# Patient Record
Sex: MALE | Race: WHITE | NOT HISPANIC OR LATINO
[De-identification: names, ages, dates, MRNs, and addresses within clinical notes are randomized per-mention and may not be internally consistent; named-entity substitution may affect disease eponyms.]

---

## 2018-08-15 ENCOUNTER — APPOINTMENT (OUTPATIENT)
Dept: FAMILY MEDICINE | Facility: CLINIC | Age: 73
End: 2018-08-15
Payer: MEDICARE

## 2018-08-15 VITALS
HEIGHT: 67 IN | TEMPERATURE: 98.4 F | OXYGEN SATURATION: 97 % | SYSTOLIC BLOOD PRESSURE: 128 MMHG | DIASTOLIC BLOOD PRESSURE: 80 MMHG | HEART RATE: 74 BPM | WEIGHT: 160 LBS | BODY MASS INDEX: 25.11 KG/M2

## 2018-08-15 DIAGNOSIS — R32 UNSPECIFIED URINARY INCONTINENCE: ICD-10-CM

## 2018-08-15 PROCEDURE — 36415 COLL VENOUS BLD VENIPUNCTURE: CPT

## 2018-08-15 PROCEDURE — 93000 ELECTROCARDIOGRAM COMPLETE: CPT | Mod: 59

## 2018-08-15 PROCEDURE — G0438: CPT

## 2018-08-15 RX ORDER — MIRABEGRON 25 MG/1
25 TABLET, FILM COATED, EXTENDED RELEASE ORAL
Qty: 30 | Refills: 0 | Status: COMPLETED | COMMUNITY
Start: 2018-03-14

## 2018-08-15 RX ORDER — SULFAMETHOXAZOLE AND TRIMETHOPRIM 800; 160 MG/1; MG/1
800-160 TABLET ORAL
Qty: 14 | Refills: 0 | Status: COMPLETED | COMMUNITY
Start: 2018-05-21

## 2018-08-15 RX ORDER — ALBUTEROL SULFATE 90 UG/1
108 (90 BASE) AEROSOL, METERED RESPIRATORY (INHALATION)
Qty: 18 | Refills: 0 | Status: COMPLETED | COMMUNITY
Start: 2018-05-30

## 2018-08-15 RX ORDER — PREDNISONE 5 MG/1
5 TABLET ORAL
Qty: 5 | Refills: 0 | Status: COMPLETED | COMMUNITY
Start: 2018-05-30

## 2018-08-15 RX ORDER — CEFUROXIME AXETIL 500 MG/1
500 TABLET ORAL
Qty: 14 | Refills: 0 | Status: COMPLETED | COMMUNITY
Start: 2018-04-25

## 2018-08-15 RX ORDER — OXYBUTYNIN CHLORIDE 10 MG/1
10 TABLET, EXTENDED RELEASE ORAL
Qty: 30 | Refills: 0 | Status: COMPLETED | COMMUNITY
Start: 2018-03-28

## 2018-08-15 RX ORDER — LEVOCETIRIZINE DIHYDROCHLORIDE 5 MG/1
5 TABLET ORAL
Qty: 30 | Refills: 0 | Status: COMPLETED | COMMUNITY
Start: 2018-05-25

## 2018-08-15 NOTE — COUNSELING
[Activity counseling provided] : activity [Behavioral health counseling provided] : behavioral health  [Walking] : Walking [Engage in a relaxing activity] : Engage in a relaxing activity [Good understanding] : Patient has a good understanding of lifestyle changes and the steps needed to achieve self management goals

## 2018-08-16 NOTE — DATA REVIEWED
[FreeTextEntry1] : Patient blood work which he brought with him was d/w him at length \par EKG - NSR at 76 bpm, no acute ST- T changes

## 2018-08-16 NOTE — PHYSICAL EXAM
[No Acute Distress] : no acute distress [Well Nourished] : well nourished [Well Developed] : well developed [Well-Appearing] : well-appearing [Normal Sclera/Conjunctiva] : normal sclera/conjunctiva [PERRL] : pupils equal round and reactive to light [Normal Outer Ear/Nose] : the outer ears and nose were normal in appearance [Normal Oropharynx] : the oropharynx was normal [No JVD] : no jugular venous distention [Supple] : supple [No Lymphadenopathy] : no lymphadenopathy [Thyroid Normal, No Nodules] : the thyroid was normal and there were no nodules present [No Respiratory Distress] : no respiratory distress  [Clear to Auscultation] : lungs were clear to auscultation bilaterally [No Accessory Muscle Use] : no accessory muscle use [Normal Rate] : normal rate  [Regular Rhythm] : with a regular rhythm [Normal S1, S2] : normal S1 and S2 [No Murmur] : no murmur heard [No Varicosities] : no varicosities [Pedal Pulses Present] : the pedal pulses are present [No Edema] : there was no peripheral edema [No Extremity Clubbing/Cyanosis] : no extremity clubbing/cyanosis [Soft] : abdomen soft [Non Tender] : non-tender [Non-distended] : non-distended [No Masses] : no abdominal mass palpated [No HSM] : no HSM [Normal Bowel Sounds] : normal bowel sounds [Declined Rectal Exam] : declined rectal exam [Normal Supraclavicular Nodes] : no supraclavicular lymphadenopathy [Normal Axillary Nodes] : no axillary lymphadenopathy [Normal Anterior Cervical Nodes] : no anterior cervical lymphadenopathy [Normal Inguinal Nodes] : no inguinal lymphadenopathy [No CVA Tenderness] : no CVA  tenderness [No Joint Swelling] : no joint swelling [Grossly Normal Strength/Tone] : grossly normal strength/tone [No Rash] : no rash [Normal Gait] : normal gait [Coordination Grossly Intact] : coordination grossly intact [No Focal Deficits] : no focal deficits [Deep Tendon Reflexes (DTR)] : deep tendon reflexes were 2+ and symmetric [Alert and Oriented x3] : oriented to person, place, and time [FreeTextEntry1] :  done by LEATHA [de-identified] : refused - done by LEATHA

## 2018-08-16 NOTE — HEALTH RISK ASSESSMENT
[Good] : ~his/her~  mood as  good [No falls in past year] : Patient reported no falls in the past year [0] : 2) Feeling down, depressed, or hopeless: Not at all (0) [Patient reported colonoscopy was normal] : Patient reported colonoscopy was normal [HIV Test offered] : HIV Test offered [Hepatitis C test offered] : Hepatitis C test offered [Alone] : lives alone [Employed] : employed [College] : College [] :  [# Of Children ___] : has [unfilled] children [Feels Safe at Home] : Feels safe at home [Independent] : managing finances [Smoke Detector] : smoke detector [Carbon Monoxide Detector] : carbon monoxide detector [Seat Belt] :  uses seat belt [Sunscreen] : uses sunscreen [Discussed at today's visit] : Advance Directives Discussed at today's visit [Aggressive treatment] : aggressive treatment [FreeTextEntry1] : none [] : No [de-identified] : NO [[id number]] : 0 [Change in mental status noted] : No change in mental status noted [Language] : denies difficulty with language [Handling Complex Tasks] : denies difficulty handling complex tasks [Reports changes in hearing] : Reports no changes in hearing [Reports changes in vision] : Reports no changes in vision [Reports changes in dental health] : Reports no changes in dental health [BoneDensityDate] : never  [ColonoscopyDate] : 2014

## 2018-08-16 NOTE — HISTORY OF PRESENT ILLNESS
[FreeTextEntry1] : cc: new pt , physical  [de-identified] : Encounter conducted in Polish , pt. native language. \par Patient came to establish himself as a new patient. Denies CP,no SOB,no Abd pain, no N,V, C,D. patient has urinary problem in the beginning of the  year - he f/u with , now better.

## 2018-08-16 NOTE — HISTORY OF PRESENT ILLNESS
[FreeTextEntry1] : cc: new pt , physical  [de-identified] : Encounter conducted in Polish , pt. native language. \par Patient came to establish himself as a new patient. Denies CP,no SOB,no Abd pain, no N,V, C,D. patient has urinary problem in the beginning of the  year - he f/u with , now better.

## 2018-08-16 NOTE — HEALTH RISK ASSESSMENT
[Good] : ~his/her~  mood as  good [No falls in past year] : Patient reported no falls in the past year [0] : 2) Feeling down, depressed, or hopeless: Not at all (0) [Patient reported colonoscopy was normal] : Patient reported colonoscopy was normal [HIV Test offered] : HIV Test offered [Hepatitis C test offered] : Hepatitis C test offered [Alone] : lives alone [Employed] : employed [College] : College [] :  [# Of Children ___] : has [unfilled] children [Feels Safe at Home] : Feels safe at home [Independent] : managing finances [Smoke Detector] : smoke detector [Carbon Monoxide Detector] : carbon monoxide detector [Seat Belt] :  uses seat belt [Sunscreen] : uses sunscreen [Discussed at today's visit] : Advance Directives Discussed at today's visit [Aggressive treatment] : aggressive treatment [FreeTextEntry1] : none [] : No [de-identified] : NO [[id number]] : 0 [Change in mental status noted] : No change in mental status noted [Language] : denies difficulty with language [Handling Complex Tasks] : denies difficulty handling complex tasks [Reports changes in hearing] : Reports no changes in hearing [Reports changes in vision] : Reports no changes in vision [Reports changes in dental health] : Reports no changes in dental health [BoneDensityDate] : never  [ColonoscopyDate] : 2014

## 2018-08-16 NOTE — PHYSICAL EXAM
[No Acute Distress] : no acute distress [Well Nourished] : well nourished [Well Developed] : well developed [Well-Appearing] : well-appearing [Normal Sclera/Conjunctiva] : normal sclera/conjunctiva [PERRL] : pupils equal round and reactive to light [Normal Outer Ear/Nose] : the outer ears and nose were normal in appearance [Normal Oropharynx] : the oropharynx was normal [No JVD] : no jugular venous distention [Supple] : supple [No Lymphadenopathy] : no lymphadenopathy [Thyroid Normal, No Nodules] : the thyroid was normal and there were no nodules present [No Respiratory Distress] : no respiratory distress  [Clear to Auscultation] : lungs were clear to auscultation bilaterally [No Accessory Muscle Use] : no accessory muscle use [Normal Rate] : normal rate  [Regular Rhythm] : with a regular rhythm [Normal S1, S2] : normal S1 and S2 [No Murmur] : no murmur heard [No Varicosities] : no varicosities [Pedal Pulses Present] : the pedal pulses are present [No Edema] : there was no peripheral edema [No Extremity Clubbing/Cyanosis] : no extremity clubbing/cyanosis [Soft] : abdomen soft [Non Tender] : non-tender [Non-distended] : non-distended [No Masses] : no abdominal mass palpated [No HSM] : no HSM [Normal Bowel Sounds] : normal bowel sounds [Declined Rectal Exam] : declined rectal exam [Normal Supraclavicular Nodes] : no supraclavicular lymphadenopathy [Normal Axillary Nodes] : no axillary lymphadenopathy [Normal Anterior Cervical Nodes] : no anterior cervical lymphadenopathy [Normal Inguinal Nodes] : no inguinal lymphadenopathy [No CVA Tenderness] : no CVA  tenderness [No Joint Swelling] : no joint swelling [Grossly Normal Strength/Tone] : grossly normal strength/tone [No Rash] : no rash [Normal Gait] : normal gait [Coordination Grossly Intact] : coordination grossly intact [No Focal Deficits] : no focal deficits [Deep Tendon Reflexes (DTR)] : deep tendon reflexes were 2+ and symmetric [Alert and Oriented x3] : oriented to person, place, and time [FreeTextEntry1] :  done by LEATHA [de-identified] : refused - done by LEATHA

## 2018-08-17 LAB
25(OH)D3 SERPL-MCNC: 30.6 NG/ML
ALBUMIN SERPL ELPH-MCNC: 5 G/DL
ALP BLD-CCNC: 94 U/L
ALT SERPL-CCNC: 14 U/L
ANION GAP SERPL CALC-SCNC: 23 MMOL/L
APPEARANCE: CLEAR
AST SERPL-CCNC: 19 U/L
BASOPHILS # BLD AUTO: 0.04 K/UL
BASOPHILS NFR BLD AUTO: 0.4 %
BILIRUB SERPL-MCNC: 0.4 MG/DL
BILIRUBIN URINE: NEGATIVE
BLOOD URINE: NEGATIVE
BUN SERPL-MCNC: 21 MG/DL
CALCIUM SERPL-MCNC: 9.9 MG/DL
CHLORIDE SERPL-SCNC: 102 MMOL/L
CHOLEST SERPL-MCNC: 163 MG/DL
CHOLEST/HDLC SERPL: 3 RATIO
CO2 SERPL-SCNC: 16 MMOL/L
COLOR: YELLOW
CREAT SERPL-MCNC: 0.96 MG/DL
EOSINOPHIL # BLD AUTO: 0.4 K/UL
EOSINOPHIL NFR BLD AUTO: 4 %
FOLATE SERPL-MCNC: 7 NG/ML
GLUCOSE QUALITATIVE U: NEGATIVE MG/DL
GLUCOSE SERPL-MCNC: 100 MG/DL
HAV IGM SER QL: NONREACTIVE
HBA1C MFR BLD HPLC: 5.3 %
HBV CORE IGM SER QL: NONREACTIVE
HBV SURFACE AG SER QL: NONREACTIVE
HCT VFR BLD CALC: 46.8 %
HCV AB SER QL: NONREACTIVE
HCV S/CO RATIO: 0.24 S/CO
HDLC SERPL-MCNC: 54 MG/DL
HGB BLD-MCNC: 15.1 G/DL
HIV1+2 AB SPEC QL IA.RAPID: NONREACTIVE
IMM GRANULOCYTES NFR BLD AUTO: 0.2 %
KETONES URINE: ABNORMAL
LDLC SERPL CALC-MCNC: 86 MG/DL
LEUKOCYTE ESTERASE URINE: NEGATIVE
LYMPHOCYTES # BLD AUTO: 2.57 K/UL
LYMPHOCYTES NFR BLD AUTO: 25.6 %
MAN DIFF?: NORMAL
MCHC RBC-ENTMCNC: 31.4 PG
MCHC RBC-ENTMCNC: 32.3 GM/DL
MCV RBC AUTO: 97.3 FL
MONOCYTES # BLD AUTO: 0.72 K/UL
MONOCYTES NFR BLD AUTO: 7.2 %
NEUTROPHILS # BLD AUTO: 6.28 K/UL
NEUTROPHILS NFR BLD AUTO: 62.6 %
NITRITE URINE: NEGATIVE
PH URINE: 5.5
PLATELET # BLD AUTO: 222 K/UL
POTASSIUM SERPL-SCNC: 4 MMOL/L
PROT SERPL-MCNC: 7.5 G/DL
PROTEIN URINE: NEGATIVE MG/DL
PSA FREE FLD-MCNC: 24.3
PSA FREE SERPL-MCNC: 0.62 NG/ML
PSA FREE SERPL-MCNC: 0.62 NG/ML
PSA SERPL-MCNC: 2.55 NG/ML
RBC # BLD: 4.81 M/UL
RBC # FLD: 13.9 %
SODIUM SERPL-SCNC: 141 MMOL/L
SPECIFIC GRAVITY URINE: 1.02
TRIGL SERPL-MCNC: 114 MG/DL
TSH SERPL-ACNC: 1.48 UIU/ML
URATE SERPL-MCNC: 7.6 MG/DL
UROBILINOGEN URINE: NEGATIVE MG/DL
VIT B12 SERPL-MCNC: 416 PG/ML
WBC # FLD AUTO: 10.03 K/UL

## 2018-09-05 ENCOUNTER — APPOINTMENT (OUTPATIENT)
Dept: FAMILY MEDICINE | Facility: CLINIC | Age: 73
End: 2018-09-05

## 2018-09-06 ENCOUNTER — LABORATORY RESULT (OUTPATIENT)
Age: 73
End: 2018-09-06

## 2018-09-06 ENCOUNTER — APPOINTMENT (OUTPATIENT)
Dept: FAMILY MEDICINE | Facility: CLINIC | Age: 73
End: 2018-09-06
Payer: MEDICARE

## 2018-09-06 VITALS
OXYGEN SATURATION: 98 % | DIASTOLIC BLOOD PRESSURE: 80 MMHG | RESPIRATION RATE: 16 BRPM | SYSTOLIC BLOOD PRESSURE: 160 MMHG | HEART RATE: 68 BPM

## 2018-09-06 DIAGNOSIS — L25.9 UNSPECIFIED CONTACT DERMATITIS, UNSPECIFIED CAUSE: ICD-10-CM

## 2018-09-06 PROCEDURE — 99213 OFFICE O/P EST LOW 20 MIN: CPT

## 2018-09-10 LAB
BARLEY IGE QN: <0.1 KUA/L
CHERRY IGE QN: 0.18 KUA/L
COMMON WASP (YELLOW JACKET) CLASS: 0
COMMON WASP (YELLOW JACKET) CONC: <0.1 KUA/L
COW MILK IGE QN: <0.1 KUA/L
CRAB IGE QN: <0.1 KUA/L
D FARINAE IGE QN: <0.1 KUA/L
D PTERONYSS IGE QN: <0.1 KUA/L
DEPRECATED BARLEY IGE RAST QL: 0
DEPRECATED CHERRY IGE RAST QL: NORMAL
DEPRECATED COW MILK IGE RAST QL: 0
DEPRECATED CRAB IGE RAST QL: 0
DEPRECATED D FARINAE IGE RAST QL: 0
DEPRECATED D PTERONYSS IGE RAST QL: 0
DEPRECATED EGG WHITE IGE RAST QL: 0
DEPRECATED HOUSE DUST IGE RAST QL: <0.1 KUA/L
DEPRECATED OAT IGE RAST QL: 0
DEPRECATED PEANUT IGE RAST QL: 0
DEPRECATED ROACH IGE RAST QL: 0
DEPRECATED RYE IGE RAST QL: 0
DEPRECATED SOYBEAN IGE RAST QL: 0
DEPRECATED WHEAT IGE RAST QL: 0
DEPRECATED WHITEFACED HORNET IGE RAST QL: 0
EGG WHITE IGE QN: <0.1 KUA/L
HEMOCCULT STL QL IA: NEGATIVE
HONEY BEE (I1) CLASS: 0
HONEY BEE (I1) CONC: <0.1 KUA/L
HOUSE DUST AP IGE QN: 0
OAT IGE QN: <0.1 KUA/L
PAPER WASP (I4) CLASS: 0
PAPER WASP (I4) CONC: <0.1 KUA/L
PEANUT IGE QN: <0.1 KUA/L
ROACH IGE QN: <0.1 KUA/L
RYE IGE QN: <0.1 KUA/L
SOYBEAN IGE QN: <0.1 KUA/L
TOTAL IGE SMQN RAST: 93 KU/L
WHEAT IGE QN: <0.1 KUA/L
WHITEFACED HORNET IGE QN: <0.1 KUA/L
YELLOW HORNET (I5) CLASS: 0
YELLOW HORNET (I5) CONC: <0.1 KUA/L

## 2018-09-13 NOTE — ASSESSMENT
[FreeTextEntry1] : 1) Contact dermatitis\par cont Benadryl TID PRN\par Prednisone\par if no improvement RTO

## 2018-09-13 NOTE — HISTORY OF PRESENT ILLNESS
[FreeTextEntry8] : cc: skin rash\par Patient had skin rash on 8/28/18 - after cleaning old , unoccupied house, Since then on and off, c/o hives,no SOB,no face edema, Denies CP,no wheezing, took Benadryl with mild improvement.

## 2018-09-13 NOTE — PHYSICAL EXAM
[No Acute Distress] : no acute distress [No Respiratory Distress] : no respiratory distress  [Clear to Auscultation] : lungs were clear to auscultation bilaterally [No Accessory Muscle Use] : no accessory muscle use [Normal Rate] : normal rate  [Regular Rhythm] : with a regular rhythm [Normal S1, S2] : normal S1 and S2 [No Murmur] : no murmur heard [Soft] : abdomen soft [Non Tender] : non-tender [Non-distended] : non-distended [No Masses] : no abdominal mass palpated [No HSM] : no HSM [Normal Bowel Sounds] : normal bowel sounds [No Joint Swelling] : no joint swelling [No Rash] : no rash [No Skin Lesions] : no skin lesions

## 2018-09-13 NOTE — REVIEW OF SYSTEMS
[Itching] : itching [Mole Changes] : no mole changes [Nail Changes] : no nail changes [Hair Changes] : no hair changes [Skin Rash] : skin rash [Negative] : Heme/Lymph

## 2018-09-28 ENCOUNTER — TRANSCRIPTION ENCOUNTER (OUTPATIENT)
Age: 73
End: 2018-09-28

## 2018-12-06 ENCOUNTER — APPOINTMENT (OUTPATIENT)
Dept: FAMILY MEDICINE | Facility: CLINIC | Age: 73
End: 2018-12-06
Payer: MEDICARE

## 2018-12-06 VITALS
SYSTOLIC BLOOD PRESSURE: 142 MMHG | TEMPERATURE: 98 F | HEIGHT: 67 IN | DIASTOLIC BLOOD PRESSURE: 80 MMHG | HEART RATE: 60 BPM | OXYGEN SATURATION: 97 % | RESPIRATION RATE: 15 BRPM

## 2018-12-06 PROCEDURE — 99214 OFFICE O/P EST MOD 30 MIN: CPT | Mod: 25

## 2018-12-06 PROCEDURE — 36415 COLL VENOUS BLD VENIPUNCTURE: CPT

## 2018-12-06 RX ORDER — PREDNISONE 20 MG/1
20 TABLET ORAL
Qty: 12 | Refills: 0 | Status: COMPLETED | COMMUNITY
Start: 2018-09-06 | End: 2018-12-06

## 2018-12-06 NOTE — REVIEW OF SYSTEMS
[Postnasal Drip] : postnasal drip [Nasal Discharge] : nasal discharge [Hoarseness] : hoarseness [Cough] : cough [Negative] : Psychiatric [Earache] : no earache [Sore Throat] : no sore throat [Shortness Of Breath] : no shortness of breath [Wheezing] : no wheezing [Dyspnea on Exertion] : not dyspnea on exertion [Itching] : no itching [Mole Changes] : no mole changes [Nail Changes] : no nail changes [Hair Changes] : no hair changes [Skin Rash] : no skin rash

## 2018-12-06 NOTE — HEALTH RISK ASSESSMENT
[No falls in past year] : Patient reported no falls in the past year [0] : 2) Feeling down, depressed, or hopeless: Not at all (0) [] : No [[id number]] : 0

## 2018-12-06 NOTE — PHYSICAL EXAM
[No Acute Distress] : no acute distress [No Respiratory Distress] : no respiratory distress  [No Accessory Muscle Use] : no accessory muscle use [Normal Rate] : normal rate  [Regular Rhythm] : with a regular rhythm [Normal S1, S2] : normal S1 and S2 [No Murmur] : no murmur heard [No Edema] : there was no peripheral edema [Soft] : abdomen soft [Non Tender] : non-tender [Non-distended] : non-distended [No Masses] : no abdominal mass palpated [No HSM] : no HSM [Normal Bowel Sounds] : normal bowel sounds [Normal Supraclavicular Nodes] : no supraclavicular lymphadenopathy [Normal Axillary Nodes] : no axillary lymphadenopathy [Normal Posterior Cervical Nodes] : no posterior cervical lymphadenopathy [Normal Anterior Cervical Nodes] : no anterior cervical lymphadenopathy [No Joint Swelling] : no joint swelling [No Rash] : no rash [No Skin Lesions] : no skin lesions [de-identified] : right posterior wheezing, + rhonchi

## 2018-12-06 NOTE — HISTORY OF PRESENT ILLNESS
[Congestion] : congestion [Cough] : cough [Moderate] : moderate [___ Days ago] : [unfilled] days ago [Paroxysmal] : paroxysmal [Chills] : chills [Headache] : headache [Improving] : improving [OTC Remedies] : OTC remedies [Sore Throat] : no sore throat [Wheezing] : no wheezing [Shortness Of Breath] : no shortness of breath [Earache] : no earache [Fever] : no fever [FreeTextEntry8] : Encounter conducted in Polish.\par \par Patient denies sick contact. He c/o abd bloating,generalized  pain, dull on and off - few months , no N,V, C,D, no weight loss .

## 2018-12-07 LAB
25(OH)D3 SERPL-MCNC: 28.3 NG/ML
ALBUMIN SERPL ELPH-MCNC: 4.6 G/DL
ALP BLD-CCNC: 81 U/L
ALT SERPL-CCNC: 8 U/L
AMYLASE/CREAT SERPL: 67 U/L
ANION GAP SERPL CALC-SCNC: 11 MMOL/L
AST SERPL-CCNC: 13 U/L
BASOPHILS # BLD AUTO: 0.02 K/UL
BASOPHILS NFR BLD AUTO: 0.3 %
BILIRUB SERPL-MCNC: 0.5 MG/DL
BUN SERPL-MCNC: 14 MG/DL
CALCIUM SERPL-MCNC: 9.4 MG/DL
CHLORIDE SERPL-SCNC: 104 MMOL/L
CHOLEST SERPL-MCNC: 141 MG/DL
CHOLEST/HDLC SERPL: 3 RATIO
CO2 SERPL-SCNC: 27 MMOL/L
CREAT SERPL-MCNC: 1.05 MG/DL
EOSINOPHIL # BLD AUTO: 0.28 K/UL
EOSINOPHIL NFR BLD AUTO: 3.8 %
GGT SERPL-CCNC: 15 U/L
GLUCOSE SERPL-MCNC: 99 MG/DL
HAV IGM SER QL: NONREACTIVE
HBV CORE IGM SER QL: NONREACTIVE
HBV SURFACE AG SER QL: NONREACTIVE
HCT VFR BLD CALC: 46.9 %
HCV AB SER QL: NONREACTIVE
HCV S/CO RATIO: 0.18 S/CO
HDLC SERPL-MCNC: 47 MG/DL
HGB BLD-MCNC: 15.1 G/DL
IMM GRANULOCYTES NFR BLD AUTO: 0.3 %
LDLC SERPL CALC-MCNC: 73 MG/DL
LPL SERPL-CCNC: 48 U/L
LYMPHOCYTES # BLD AUTO: 2.02 K/UL
LYMPHOCYTES NFR BLD AUTO: 27.6 %
MAN DIFF?: NORMAL
MCHC RBC-ENTMCNC: 31.4 PG
MCHC RBC-ENTMCNC: 32.2 GM/DL
MCV RBC AUTO: 97.5 FL
MONOCYTES # BLD AUTO: 0.58 K/UL
MONOCYTES NFR BLD AUTO: 7.9 %
NEUTROPHILS # BLD AUTO: 4.39 K/UL
NEUTROPHILS NFR BLD AUTO: 60.1 %
PLATELET # BLD AUTO: 231 K/UL
POTASSIUM SERPL-SCNC: 4.4 MMOL/L
PROT SERPL-MCNC: 7.2 G/DL
RBC # BLD: 4.81 M/UL
RBC # FLD: 13.2 %
SODIUM SERPL-SCNC: 142 MMOL/L
TRIGL SERPL-MCNC: 103 MG/DL
WBC # FLD AUTO: 7.31 K/UL

## 2018-12-13 ENCOUNTER — FORM ENCOUNTER (OUTPATIENT)
Age: 73
End: 2018-12-13

## 2018-12-14 ENCOUNTER — OUTPATIENT (OUTPATIENT)
Dept: OUTPATIENT SERVICES | Facility: HOSPITAL | Age: 73
LOS: 1 days | End: 2018-12-14
Payer: COMMERCIAL

## 2018-12-14 ENCOUNTER — APPOINTMENT (OUTPATIENT)
Dept: ULTRASOUND IMAGING | Facility: HOSPITAL | Age: 73
End: 2018-12-14
Payer: MEDICARE

## 2018-12-14 DIAGNOSIS — R10.9 UNSPECIFIED ABDOMINAL PAIN: ICD-10-CM

## 2018-12-14 PROCEDURE — 76700 US EXAM ABDOM COMPLETE: CPT | Mod: 26

## 2018-12-14 PROCEDURE — 76700 US EXAM ABDOM COMPLETE: CPT

## 2018-12-18 ENCOUNTER — RESULT CHARGE (OUTPATIENT)
Age: 73
End: 2018-12-18

## 2018-12-18 ENCOUNTER — OTHER (OUTPATIENT)
Age: 73
End: 2018-12-18

## 2018-12-18 DIAGNOSIS — K76.89 OTHER SPECIFIED DISEASES OF LIVER: ICD-10-CM

## 2019-01-06 ENCOUNTER — FORM ENCOUNTER (OUTPATIENT)
Age: 74
End: 2019-01-06

## 2019-01-07 ENCOUNTER — APPOINTMENT (OUTPATIENT)
Dept: CT IMAGING | Facility: HOSPITAL | Age: 74
End: 2019-01-07
Payer: MEDICARE

## 2019-01-07 ENCOUNTER — OUTPATIENT (OUTPATIENT)
Dept: OUTPATIENT SERVICES | Facility: HOSPITAL | Age: 74
LOS: 1 days | End: 2019-01-07
Payer: COMMERCIAL

## 2019-01-07 DIAGNOSIS — R10.9 UNSPECIFIED ABDOMINAL PAIN: ICD-10-CM

## 2019-01-07 PROCEDURE — 74177 CT ABD & PELVIS W/CONTRAST: CPT

## 2019-01-07 PROCEDURE — 74177 CT ABD & PELVIS W/CONTRAST: CPT | Mod: 26

## 2019-01-09 ENCOUNTER — FORM ENCOUNTER (OUTPATIENT)
Age: 74
End: 2019-01-09

## 2019-01-10 ENCOUNTER — OUTPATIENT (OUTPATIENT)
Dept: OUTPATIENT SERVICES | Facility: HOSPITAL | Age: 74
LOS: 1 days | End: 2019-01-10
Payer: COMMERCIAL

## 2019-01-10 ENCOUNTER — APPOINTMENT (OUTPATIENT)
Dept: CT IMAGING | Facility: HOSPITAL | Age: 74
End: 2019-01-10
Payer: MEDICARE

## 2019-01-10 DIAGNOSIS — R91.1 SOLITARY PULMONARY NODULE: ICD-10-CM

## 2019-01-10 PROCEDURE — 71250 CT THORAX DX C-: CPT

## 2019-01-10 PROCEDURE — 71250 CT THORAX DX C-: CPT | Mod: 26

## 2019-03-09 ENCOUNTER — TRANSCRIPTION ENCOUNTER (OUTPATIENT)
Age: 74
End: 2019-03-09

## 2019-08-07 ENCOUNTER — APPOINTMENT (OUTPATIENT)
Dept: FAMILY MEDICINE | Facility: CLINIC | Age: 74
End: 2019-08-07
Payer: MEDICARE

## 2019-08-07 VITALS
RESPIRATION RATE: 17 BRPM | HEIGHT: 67 IN | OXYGEN SATURATION: 97 % | HEART RATE: 85 BPM | BODY MASS INDEX: 25.74 KG/M2 | DIASTOLIC BLOOD PRESSURE: 70 MMHG | TEMPERATURE: 98.4 F | SYSTOLIC BLOOD PRESSURE: 140 MMHG | WEIGHT: 164 LBS

## 2019-08-07 PROCEDURE — 99213 OFFICE O/P EST LOW 20 MIN: CPT | Mod: 25

## 2019-08-07 PROCEDURE — 96372 THER/PROPH/DIAG INJ SC/IM: CPT

## 2019-08-07 RX ORDER — METHYLPRED ACET/NACL,ISO-OS/PF 40 MG/ML
40 VIAL (ML) INJECTION
Qty: 1 | Refills: 0 | Status: COMPLETED | OUTPATIENT
Start: 2019-08-07

## 2019-08-07 RX ORDER — AZITHROMYCIN 250 MG/1
250 TABLET, FILM COATED ORAL
Qty: 1 | Refills: 0 | Status: COMPLETED | COMMUNITY
Start: 2018-12-06 | End: 2019-08-07

## 2019-08-07 RX ORDER — GUAIFENESIN 600 MG/1
600 TABLET, EXTENDED RELEASE ORAL
Refills: 0 | Status: COMPLETED | COMMUNITY
End: 2019-08-07

## 2019-08-07 RX ADMIN — METHYLPREDNISOLONE ACETATE 0 MG/ML: 40 INJECTION, SUSPENSION INTRA-ARTICULAR; INTRALESIONAL; INTRAMUSCULAR; SOFT TISSUE at 00:00

## 2019-08-07 NOTE — REVIEW OF SYSTEMS
[Fever] : no fever [Chills] : no chills [Fatigue] : fatigue [Nosebleeds] : no nosebleeds [Earache] : earache [Postnasal Drip] : postnasal drip [Nasal Discharge] : nasal discharge [Sore Throat] : no sore throat [Hoarseness] : hoarseness [Shortness Of Breath] : no shortness of breath [Wheezing] : wheezing [Cough] : no cough [Dyspnea on Exertion] : not dyspnea on exertion [Negative] : Neurological

## 2019-08-07 NOTE — PHYSICAL EXAM
[No Respiratory Distress] : no respiratory distress  [Normal] : no joint swelling and grossly normal strength and tone [de-identified] : + rhonchi, + wheezing

## 2019-08-07 NOTE — HISTORY OF PRESENT ILLNESS
[Cold Symptoms] : cold symptoms [Earache (L)] : pain in left ear [Moderate] : moderate [___ Days ago] : [unfilled] days ago [Constant] : constant [Congestion] : congestion [Cough] : cough [Earache] : earache [Worsening] : worsening [Wheezing] : wheezing [Sore Throat] : no sore throat [Chills] : no chills [Anorexia] : no anorexia [Shortness Of Breath] : no shortness of breath [Fatigue] : not fatigue [Headache] : no headache [Fever] : no fever [FreeTextEntry8] : Patient peaces CP , Abd pain. Patient took OTC meds, no improvement. + sick contact grandchildren.

## 2019-08-07 NOTE — COUNSELING
[Fall prevention counseling provided] : Fall prevention counseling provided [Adequate lighting] : Adequate lighting [No throw rugs] : No throw rugs [Use proper foot wear] : Use proper foot wear [Use recommended devices] : Use recommended devices [None] : None [Good understanding] : Patient has a good understanding of lifestyle changes and steps needed to achieve self management goal

## 2019-08-13 ENCOUNTER — FORM ENCOUNTER (OUTPATIENT)
Age: 74
End: 2019-08-13

## 2019-08-14 ENCOUNTER — APPOINTMENT (OUTPATIENT)
Dept: RADIOLOGY | Facility: HOSPITAL | Age: 74
End: 2019-08-14
Payer: MEDICARE

## 2019-08-14 ENCOUNTER — APPOINTMENT (OUTPATIENT)
Dept: FAMILY MEDICINE | Facility: CLINIC | Age: 74
End: 2019-08-14
Payer: MEDICARE

## 2019-08-14 ENCOUNTER — OUTPATIENT (OUTPATIENT)
Dept: OUTPATIENT SERVICES | Facility: HOSPITAL | Age: 74
LOS: 1 days | End: 2019-08-14
Payer: COMMERCIAL

## 2019-08-14 ENCOUNTER — RX RENEWAL (OUTPATIENT)
Age: 74
End: 2019-08-14

## 2019-08-14 VITALS
BODY MASS INDEX: 25.58 KG/M2 | HEART RATE: 98 BPM | WEIGHT: 163 LBS | OXYGEN SATURATION: 93 % | HEIGHT: 67 IN | TEMPERATURE: 97.9 F | RESPIRATION RATE: 19 BRPM | SYSTOLIC BLOOD PRESSURE: 160 MMHG | DIASTOLIC BLOOD PRESSURE: 70 MMHG

## 2019-08-14 DIAGNOSIS — Z00.8 ENCOUNTER FOR OTHER GENERAL EXAMINATION: ICD-10-CM

## 2019-08-14 PROCEDURE — 71046 X-RAY EXAM CHEST 2 VIEWS: CPT

## 2019-08-14 PROCEDURE — 36415 COLL VENOUS BLD VENIPUNCTURE: CPT

## 2019-08-14 PROCEDURE — 99213 OFFICE O/P EST LOW 20 MIN: CPT | Mod: 25

## 2019-08-14 PROCEDURE — 71046 X-RAY EXAM CHEST 2 VIEWS: CPT | Mod: 26

## 2019-08-14 RX ORDER — AMOXICILLIN AND CLAVULANATE POTASSIUM 875; 125 MG/1; MG/1
875-125 TABLET, COATED ORAL
Qty: 14 | Refills: 0 | Status: COMPLETED | COMMUNITY
Start: 2019-08-07 | End: 2019-08-14

## 2019-08-15 ENCOUNTER — RX RENEWAL (OUTPATIENT)
Age: 74
End: 2019-08-15

## 2019-08-15 LAB
ALBUMIN SERPL ELPH-MCNC: 4.5 G/DL
ALP BLD-CCNC: 77 U/L
ALT SERPL-CCNC: 11 U/L
ANION GAP SERPL CALC-SCNC: 11 MMOL/L
AST SERPL-CCNC: 13 U/L
BASOPHILS # BLD AUTO: 0.06 K/UL
BASOPHILS NFR BLD AUTO: 0.5 %
BILIRUB SERPL-MCNC: 0.5 MG/DL
BUN SERPL-MCNC: 18 MG/DL
CALCIUM SERPL-MCNC: 9.8 MG/DL
CHLORIDE SERPL-SCNC: 104 MMOL/L
CO2 SERPL-SCNC: 23 MMOL/L
CREAT SERPL-MCNC: 0.88 MG/DL
EOSINOPHIL # BLD AUTO: 0.32 K/UL
EOSINOPHIL NFR BLD AUTO: 2.9 %
GLUCOSE SERPL-MCNC: 119 MG/DL
HCT VFR BLD CALC: 45.7 %
HGB BLD-MCNC: 15 G/DL
IMM GRANULOCYTES NFR BLD AUTO: 0.5 %
LYMPHOCYTES # BLD AUTO: 1.91 K/UL
LYMPHOCYTES NFR BLD AUTO: 17.4 %
MAN DIFF?: NORMAL
MCHC RBC-ENTMCNC: 31.8 PG
MCHC RBC-ENTMCNC: 32.8 GM/DL
MCV RBC AUTO: 96.8 FL
MONOCYTES # BLD AUTO: 1.04 K/UL
MONOCYTES NFR BLD AUTO: 9.5 %
NEUTROPHILS # BLD AUTO: 7.59 K/UL
NEUTROPHILS NFR BLD AUTO: 69.2 %
PLATELET # BLD AUTO: 303 K/UL
POTASSIUM SERPL-SCNC: 4.2 MMOL/L
PROT SERPL-MCNC: 7.3 G/DL
RBC # BLD: 4.72 M/UL
RBC # FLD: 13.2 %
SODIUM SERPL-SCNC: 138 MMOL/L
WBC # FLD AUTO: 10.97 K/UL

## 2019-08-18 NOTE — REVIEW OF SYSTEMS
[Fever] : no fever [Chills] : no chills [Fatigue] : no fatigue [Earache] : no earache [Nosebleeds] : no nosebleeds [Postnasal Drip] : postnasal drip [Nasal Discharge] : no nasal discharge [Sore Throat] : no sore throat [Hoarseness] : no hoarseness [Shortness Of Breath] : no shortness of breath [Wheezing] : wheezing [Cough] : cough [Dyspnea on Exertion] : not dyspnea on exertion [Negative] : Neurological [FreeTextEntry6] : productive , green sputum

## 2019-08-18 NOTE — HISTORY OF PRESENT ILLNESS
[de-identified] : Encounter conducted in Polish , pt. native language. \par Patient presented for f/u on his Bronchitis - still coughing, + wheezing , deneis chills, no fever, deneis Fatigue, He completed Abx, steroids, not much better. He denies CP, Abd pain, no N,V<C<D.

## 2019-08-18 NOTE — PHYSICAL EXAM
[No Respiratory Distress] : no respiratory distress  [No Accessory Muscle Use] : no accessory muscle use [Normal] : no posterior cervical lymphadenopathy and no anterior cervical lymphadenopathy [de-identified] : + LLQ jhony

## 2019-08-18 NOTE — ASSESSMENT
[FreeTextEntry1] : CXR\par f/u blood work \par Levaquin\par Proventil PRN \par cough meds\par if SOB,fever, fatigue or any concerns ED eval advised

## 2019-08-20 ENCOUNTER — APPOINTMENT (OUTPATIENT)
Dept: FAMILY MEDICINE | Facility: CLINIC | Age: 74
End: 2019-08-20
Payer: MEDICARE

## 2019-08-20 VITALS
SYSTOLIC BLOOD PRESSURE: 140 MMHG | TEMPERATURE: 98.2 F | OXYGEN SATURATION: 97 % | HEIGHT: 67 IN | DIASTOLIC BLOOD PRESSURE: 66 MMHG | WEIGHT: 161 LBS | RESPIRATION RATE: 17 BRPM | HEART RATE: 75 BPM | BODY MASS INDEX: 25.27 KG/M2

## 2019-08-20 PROCEDURE — 99213 OFFICE O/P EST LOW 20 MIN: CPT

## 2019-08-20 RX ORDER — PREDNISONE 20 MG/1
20 TABLET ORAL DAILY
Qty: 5 | Refills: 0 | Status: COMPLETED | COMMUNITY
Start: 2019-08-07 | End: 2019-08-20

## 2019-08-20 NOTE — REVIEW OF SYSTEMS
[Fever] : no fever [Chills] : no chills [Earache] : no earache [Fatigue] : no fatigue [Nosebleeds] : no nosebleeds [Postnasal Drip] : postnasal drip [Nasal Discharge] : no nasal discharge [Sore Throat] : no sore throat [Shortness Of Breath] : no shortness of breath [Hoarseness] : no hoarseness [Wheezing] : no wheezing [Cough] : cough [Dyspnea on Exertion] : not dyspnea on exertion [Negative] : Neurological [FreeTextEntry6] : dry cough

## 2019-08-20 NOTE — COUNSELING
[Fall prevention counseling provided] : Fall prevention counseling provided [Adequate lighting] : Adequate lighting [No throw rugs] : No throw rugs [Use recommended devices] : Use recommended devices [Use proper foot wear] : Use proper foot wear [Good understanding] : Patient has a good understanding of lifestyle changes and steps needed to achieve self management goal [None] : None

## 2019-08-20 NOTE — PHYSICAL EXAM
[No Respiratory Distress] : no respiratory distress  [No Accessory Muscle Use] : no accessory muscle use [No Varicosities] : no varicosities [No Edema] : there was no peripheral edema [Normal] : no posterior cervical lymphadenopathy and no anterior cervical lymphadenopathy [de-identified] : scattered  rhonchi b/l posterior

## 2019-08-20 NOTE — HISTORY OF PRESENT ILLNESS
[FreeTextEntry1] : cc: cough , bronchitis [de-identified] : Encounter conducted in Polish , pt. native language. \par Patient presented for f/u on his  cough, bronchitis - still  coughing but less, ,no  wheezing , deneis chills, no fever,. He deneis CP, Abd pain.Pt took Hydromet sirup - was able to sleep better. CXR and BW d/w the pt  at length

## 2019-08-21 ENCOUNTER — APPOINTMENT (OUTPATIENT)
Dept: FAMILY MEDICINE | Facility: CLINIC | Age: 74
End: 2019-08-21
Payer: MEDICARE

## 2019-10-17 ENCOUNTER — APPOINTMENT (OUTPATIENT)
Dept: FAMILY MEDICINE | Facility: CLINIC | Age: 74
End: 2019-10-17
Payer: MEDICARE

## 2019-10-17 VITALS
DIASTOLIC BLOOD PRESSURE: 76 MMHG | HEART RATE: 67 BPM | OXYGEN SATURATION: 96 % | BODY MASS INDEX: 25.74 KG/M2 | SYSTOLIC BLOOD PRESSURE: 146 MMHG | WEIGHT: 164 LBS | TEMPERATURE: 98.1 F | HEIGHT: 67 IN

## 2019-10-17 DIAGNOSIS — Z87.09 PERSONAL HISTORY OF OTHER DISEASES OF THE RESPIRATORY SYSTEM: ICD-10-CM

## 2019-10-17 PROCEDURE — 99213 OFFICE O/P EST LOW 20 MIN: CPT

## 2019-11-06 NOTE — PHYSICAL EXAM
[Normal] : soft, non-tender, non-distended, no masses palpated, no HSM and normal bowel sounds [de-identified] : + pharynx erythema , nasal edema

## 2019-11-06 NOTE — REVIEW OF SYSTEMS
[Postnasal Drip] : postnasal drip [Nasal Discharge] : nasal discharge [Sore Throat] : no sore throat [Hoarseness] : hoarseness [Shortness Of Breath] : no shortness of breath [Wheezing] : no wheezing [Cough] : cough [Dyspnea on Exertion] : not dyspnea on exertion [Negative] : Neurological

## 2019-11-06 NOTE — ASSESSMENT
[FreeTextEntry1] : Abx, \par Hydrocodone \par rest \par  supportive care d/w the pt at length \par if no improvement RTO for further eval\par

## 2019-11-06 NOTE — HISTORY OF PRESENT ILLNESS
[FreeTextEntry8] : cc: cough is not improving \par patient came c/o head congestion, + chronic, worsening cough - so far treatment did not make it better.  Deneis fever, no chills, no N,V,C,D.

## 2019-11-06 NOTE — HEALTH RISK ASSESSMENT
[] : No [No] : No [1 or 2 (0 pts)] : 1 or 2 (0 points) [Never (0 pts)] : Never (0 points) [Audit-CScore] : 0

## 2019-12-12 ENCOUNTER — APPOINTMENT (OUTPATIENT)
Dept: FAMILY MEDICINE | Facility: CLINIC | Age: 74
End: 2019-12-12
Payer: MEDICARE

## 2019-12-12 ENCOUNTER — NON-APPOINTMENT (OUTPATIENT)
Age: 74
End: 2019-12-12

## 2019-12-12 VITALS
DIASTOLIC BLOOD PRESSURE: 74 MMHG | WEIGHT: 162 LBS | RESPIRATION RATE: 18 BRPM | HEART RATE: 60 BPM | OXYGEN SATURATION: 98 % | TEMPERATURE: 98.4 F | HEIGHT: 67 IN | SYSTOLIC BLOOD PRESSURE: 132 MMHG | BODY MASS INDEX: 25.43 KG/M2

## 2019-12-12 DIAGNOSIS — Z87.898 PERSONAL HISTORY OF OTHER SPECIFIED CONDITIONS: ICD-10-CM

## 2019-12-12 DIAGNOSIS — R93.2 ABNORMAL FINDINGS ON DIAGNOSTIC IMAGING OF LIVER AND BILIARY TRACT: ICD-10-CM

## 2019-12-12 PROCEDURE — G0439: CPT | Mod: 25

## 2019-12-12 PROCEDURE — 36415 COLL VENOUS BLD VENIPUNCTURE: CPT

## 2019-12-12 PROCEDURE — 93000 ELECTROCARDIOGRAM COMPLETE: CPT

## 2019-12-12 RX ORDER — LEVOFLOXACIN 500 MG/1
500 TABLET, FILM COATED ORAL DAILY
Qty: 7 | Refills: 0 | Status: COMPLETED | COMMUNITY
Start: 2019-08-14 | End: 2019-12-12

## 2019-12-12 RX ORDER — ALBUTEROL SULFATE 108 UG/1
108 (90 BASE) AEROSOL, METERED RESPIRATORY (INHALATION)
Qty: 1 | Refills: 0 | Status: COMPLETED | COMMUNITY
Start: 2019-08-15 | End: 2019-12-12

## 2019-12-12 RX ORDER — ALBUTEROL SULFATE 90 UG/1
108 (90 BASE) AEROSOL, METERED RESPIRATORY (INHALATION)
Qty: 1 | Refills: 0 | Status: COMPLETED | COMMUNITY
Start: 2019-08-15 | End: 2019-12-12

## 2019-12-12 RX ORDER — ALBUTEROL SULFATE 108 UG/1
108 (90 BASE) AEROSOL, METERED RESPIRATORY (INHALATION)
Qty: 1 | Refills: 0 | Status: COMPLETED | COMMUNITY
Start: 2019-08-14 | End: 2019-12-12

## 2019-12-12 RX ORDER — HYDROCODONE BITARTRATE AND HOMATROPINE METHYLBROMIDE 5; 1.5 MG/5ML; MG/5ML
5-1.5 SYRUP ORAL
Qty: 120 | Refills: 0 | Status: COMPLETED | COMMUNITY
Start: 2019-08-14 | End: 2019-12-12

## 2019-12-12 NOTE — DATA REVIEWED
[FreeTextEntry1] : last blood work d/w the pt at length\par \par EKG - NSR at 57 bpm, no acute ST- T changes

## 2019-12-12 NOTE — HEALTH RISK ASSESSMENT
[Very Good] : ~his/her~  mood as very good [Monthly or less (1 pt)] : Monthly or less (1 point) [Yes] : Yes [1 or 2 (0 pts)] : 1 or 2 (0 points) [Never (0 pts)] : Never (0 points) [No] : In the past 12 months have you used drugs other than those required for medical reasons? No [No falls in past year] : Patient reported no falls in the past year [0] : 2) Feeling down, depressed, or hopeless: Not at all (0) [None] : None [With Significant Other] : lives with significant other [Employed] : employed [With Family] : lives with family [] :  [College] : College [Feels Safe at Home] : Feels safe at home [# Of Children ___] : has [unfilled] children [Independent] : using transportation [Carbon Monoxide Detector] : carbon monoxide detector [Seat Belt] :  uses seat belt [Sunscreen] : uses sunscreen [With Patient/Caregiver] : With Patient/Caregiver [Aggressive treatment] : aggressive treatment [FreeTextEntry1] : none [de-identified] : No [de-identified] : LEATHA  [] : No [de-identified] : walking , exercise  [Audit-CScore] : 1 [de-identified] : healthy  [[id number]] : 0 [Language] : denies difficulty with language [Change in mental status noted] : No change in mental status noted [Reports changes in hearing] : Reports no changes in hearing [Reports changes in vision] : Reports no changes in vision [Smoke Detector] : no smoke detector [Reports changes in dental health] : Reports no changes in dental health [PapSmearDate] : NA [BoneDensityDate] : never [MammogramDate] : NA [HIVDate] : 08/18 [ColonoscopyDate] : 02/15 [HepatitisCDate] : 08/18 [AdvancecareDate] : 12/19

## 2019-12-12 NOTE — PHYSICAL EXAM
[No Acute Distress] : no acute distress [Well Developed] : well developed [Well Nourished] : well nourished [Well-Appearing] : well-appearing [Normal] : no acute distress, well nourished, well developed and well-appearing [PERRL] : pupils equal round and reactive to light [Normal Sclera/Conjunctiva] : normal sclera/conjunctiva [Normal Oropharynx] : the oropharynx was normal [EOMI] : extraocular movements intact [Normal Outer Ear/Nose] : the outer ears and nose were normal in appearance [No JVD] : no jugular venous distention [No Lymphadenopathy] : no lymphadenopathy [No Respiratory Distress] : no respiratory distress  [Supple] : supple [Normal Rate] : normal rate  [No Accessory Muscle Use] : no accessory muscle use [Clear to Auscultation] : lungs were clear to auscultation bilaterally [Normal S1, S2] : normal S1 and S2 [Regular Rhythm] : with a regular rhythm [No Murmur] : no murmur heard [No Varicosities] : no varicosities [No Edema] : there was no peripheral edema [Soft] : abdomen soft [Non Tender] : non-tender [Non-distended] : non-distended [Normal Bowel Sounds] : normal bowel sounds [No HSM] : no HSM [No Masses] : no abdominal mass palpated [Normal Posterior Cervical Nodes] : no posterior cervical lymphadenopathy [Normal Anterior Cervical Nodes] : no anterior cervical lymphadenopathy [No CVA Tenderness] : no CVA  tenderness [No Spinal Tenderness] : no spinal tenderness [No Joint Swelling] : no joint swelling [Grossly Normal Strength/Tone] : grossly normal strength/tone [No Rash] : no rash [No Focal Deficits] : no focal deficits [Coordination Grossly Intact] : coordination grossly intact [Normal Gait] : normal gait [Deep Tendon Reflexes (DTR)] : deep tendon reflexes were 2+ and symmetric [Alert and Oriented x3] : oriented to person, place, and time [FreeTextEntry1] : done by  month ago as per pt

## 2019-12-12 NOTE — HISTORY OF PRESENT ILLNESS
[FreeTextEntry1] : cc: physical  [de-identified] : Patient came  for physical. She denies CP,SOB,Abd pain, no N,V,C,D.\par

## 2019-12-12 NOTE — COUNSELING
[Fall prevention counseling provided] : Fall prevention counseling provided [No throw rugs] : No throw rugs [Adequate lighting] : Adequate lighting [Use proper foot wear] : Use proper foot wear [Use recommended devices] : Use recommended devices [Good understanding] : Patient has a good understanding of lifestyle changes and steps needed to achieve self management goal

## 2019-12-16 LAB
25(OH)D3 SERPL-MCNC: 48 NG/ML
ALBUMIN SERPL ELPH-MCNC: 4.7 G/DL
ALP BLD-CCNC: 82 U/L
ALT SERPL-CCNC: 9 U/L
ANION GAP SERPL CALC-SCNC: 13 MMOL/L
APPEARANCE: CLEAR
AST SERPL-CCNC: 15 U/L
BASOPHILS # BLD AUTO: 0.04 K/UL
BASOPHILS NFR BLD AUTO: 0.7 %
BILIRUB SERPL-MCNC: 0.8 MG/DL
BILIRUBIN URINE: NEGATIVE
BLOOD URINE: NEGATIVE
BUN SERPL-MCNC: 18 MG/DL
CALCIUM SERPL-MCNC: 10.2 MG/DL
CHLORIDE SERPL-SCNC: 102 MMOL/L
CHOLEST SERPL-MCNC: 160 MG/DL
CHOLEST/HDLC SERPL: 3 RATIO
CO2 SERPL-SCNC: 26 MMOL/L
COLOR: NORMAL
CREAT SERPL-MCNC: 0.94 MG/DL
EOSINOPHIL # BLD AUTO: 0.29 K/UL
EOSINOPHIL NFR BLD AUTO: 4.7 %
ESTIMATED AVERAGE GLUCOSE: 105 MG/DL
FOLATE SERPL-MCNC: 11.8 NG/ML
GLUCOSE QUALITATIVE U: NEGATIVE
GLUCOSE SERPL-MCNC: 103 MG/DL
HBA1C MFR BLD HPLC: 5.3 %
HCT VFR BLD CALC: 48.9 %
HDLC SERPL-MCNC: 54 MG/DL
HGB BLD-MCNC: 16 G/DL
IMM GRANULOCYTES NFR BLD AUTO: 0.2 %
KETONES URINE: NEGATIVE
LDLC SERPL CALC-MCNC: 88 MG/DL
LEUKOCYTE ESTERASE URINE: NEGATIVE
LYMPHOCYTES # BLD AUTO: 1.7 K/UL
LYMPHOCYTES NFR BLD AUTO: 27.6 %
MAN DIFF?: NORMAL
MCHC RBC-ENTMCNC: 31.3 PG
MCHC RBC-ENTMCNC: 32.7 GM/DL
MCV RBC AUTO: 95.7 FL
MONOCYTES # BLD AUTO: 0.54 K/UL
MONOCYTES NFR BLD AUTO: 8.8 %
NEUTROPHILS # BLD AUTO: 3.57 K/UL
NEUTROPHILS NFR BLD AUTO: 58 %
NITRITE URINE: NEGATIVE
PH URINE: 6.5
PLATELET # BLD AUTO: 232 K/UL
POTASSIUM SERPL-SCNC: 5.1 MMOL/L
PROT SERPL-MCNC: 7.4 G/DL
PROTEIN URINE: NEGATIVE
RBC # BLD: 5.11 M/UL
RBC # FLD: 12.6 %
SODIUM SERPL-SCNC: 141 MMOL/L
SPECIFIC GRAVITY URINE: 1.02
TRIGL SERPL-MCNC: 92 MG/DL
TSH SERPL-ACNC: 1.59 UIU/ML
URATE SERPL-MCNC: 6 MG/DL
UROBILINOGEN URINE: NORMAL
VIT B12 SERPL-MCNC: 358 PG/ML
WBC # FLD AUTO: 6.15 K/UL

## 2020-01-06 ENCOUNTER — APPOINTMENT (OUTPATIENT)
Dept: FAMILY MEDICINE | Facility: CLINIC | Age: 75
End: 2020-01-06
Payer: MEDICARE

## 2020-01-06 VITALS
SYSTOLIC BLOOD PRESSURE: 140 MMHG | DIASTOLIC BLOOD PRESSURE: 70 MMHG | WEIGHT: 162 LBS | OXYGEN SATURATION: 97 % | BODY MASS INDEX: 25.43 KG/M2 | HEART RATE: 90 BPM | HEIGHT: 67 IN | TEMPERATURE: 98.2 F

## 2020-01-06 DIAGNOSIS — Z87.09 PERSONAL HISTORY OF OTHER DISEASES OF THE RESPIRATORY SYSTEM: ICD-10-CM

## 2020-01-06 DIAGNOSIS — J06.9 ACUTE UPPER RESPIRATORY INFECTION, UNSPECIFIED: ICD-10-CM

## 2020-01-06 PROCEDURE — 99213 OFFICE O/P EST LOW 20 MIN: CPT

## 2020-01-06 NOTE — PHYSICAL EXAM
[PERRL] : pupils equal round and reactive to light [No Acute Distress] : no acute distress [Well Nourished] : well nourished [Supple] : supple [Normal Oropharynx] : the oropharynx was normal [Normal S1, S2] : normal S1 and S2 [Clear to Auscultation] : lungs were clear to auscultation bilaterally [No Edema] : there was no peripheral edema [Soft] : abdomen soft [Non Tender] : non-tender [Normal Bowel Sounds] : normal bowel sounds [No Joint Swelling] : no joint swelling [Normal Gait] : normal gait [No Rash] : no rash

## 2020-01-06 NOTE — HISTORY OF PRESENT ILLNESS
[Cold Symptoms] : cold symptoms [___ Days ago] : [unfilled] days ago [Mild] : mild [Constant] : constant [Sore Throat] : sore throat [Chills] : chills [At Night] : at night [Improving] : improving [Congestion] : no congestion [Cough] : no cough [Wheezing] : no wheezing [Anorexia] : no anorexia [Shortness Of Breath] : no shortness of breath [Earache] : no earache [Fatigue] : not fatigue [Headache] : no headache [Fever] : no fever [FreeTextEntry5] : nyquil [FreeTextEntry8] : Jay Jay Mahajan is a 75 yo male presents today presents with symptoms listed below. Sick contact people in the community and home similar symptoms, no recent travel.

## 2020-01-06 NOTE — REVIEW OF SYSTEMS
[Nasal Discharge] : nasal discharge [Cough] : cough [Sore Throat] : sore throat [Negative] : Heme/Lymph [Earache] : no earache [Hearing Loss] : no hearing loss [Nosebleeds] : no nosebleeds [Postnasal Drip] : no postnasal drip [Hoarseness] : no hoarseness

## 2020-01-08 ENCOUNTER — APPOINTMENT (OUTPATIENT)
Dept: RADIOLOGY | Facility: HOSPITAL | Age: 75
End: 2020-01-08

## 2020-01-21 ENCOUNTER — APPOINTMENT (OUTPATIENT)
Dept: FAMILY MEDICINE | Facility: CLINIC | Age: 75
End: 2020-01-21
Payer: MEDICARE

## 2020-01-21 VITALS
RESPIRATION RATE: 17 BRPM | HEART RATE: 81 BPM | TEMPERATURE: 98.2 F | SYSTOLIC BLOOD PRESSURE: 148 MMHG | WEIGHT: 162 LBS | BODY MASS INDEX: 25.43 KG/M2 | HEIGHT: 67 IN | DIASTOLIC BLOOD PRESSURE: 76 MMHG | OXYGEN SATURATION: 94 %

## 2020-01-21 PROCEDURE — 96372 THER/PROPH/DIAG INJ SC/IM: CPT

## 2020-01-21 PROCEDURE — 99214 OFFICE O/P EST MOD 30 MIN: CPT | Mod: 25

## 2020-01-21 RX ORDER — METHYLPRED ACET/NACL,ISO-OS/PF 40 MG/ML
40 VIAL (ML) INJECTION
Qty: 1 | Refills: 0 | Status: COMPLETED | OUTPATIENT
Start: 2020-01-21

## 2020-01-21 RX ADMIN — METHYLPREDNISOLONE ACETATE 0 MG/ML: 40 INJECTION, SUSPENSION INTRA-ARTICULAR; INTRALESIONAL; INTRAMUSCULAR; SOFT TISSUE at 00:00

## 2020-01-21 NOTE — HISTORY OF PRESENT ILLNESS
[FreeTextEntry1] : cc: chronic cough , weakness  [de-identified] : Encounter conducted in Polish\par Patient came c/o runny nose - decreasing, cough - worse at night, productive , + REYES. Patient was seen in the Office 01/06/2020 - supportive care was recommended, Since then he has + runny nose with bloody discharge , head congestion, + fatigue and weakness . He deneis CP, his sinus improved - now only worsening cough and chronic fatigue. Pt has CT chest done 01/2019 + nodules - CT 12 months f/u recommended, pt never went so far.

## 2020-01-21 NOTE — REVIEW OF SYSTEMS
[Fatigue] : fatigue [Negative] : Neurological [Fever] : no fever [Chills] : no chills [Shortness Of Breath] : no shortness of breath [Wheezing] : wheezing [Cough] : cough [Dyspnea on Exertion] : dyspnea on exertion

## 2020-01-21 NOTE — PHYSICAL EXAM
[Normal Outer Ear/Nose] : the outer ears and nose were normal in appearance [No Respiratory Distress] : no respiratory distress  [No Accessory Muscle Use] : no accessory muscle use [Normal Gait] : normal gait [Alert and Oriented x3] : oriented to person, place, and time [Normal] : affect was normal and insight and judgment were intact [de-identified] : pale [de-identified] : + pharynx erythema, + TM fluid left TM [de-identified] : + left rhonchi and wheezing

## 2020-01-21 NOTE — HEALTH RISK ASSESSMENT
[Yes] : Yes [1 or 2 (0 pts)] : 1 or 2 (0 points) [Never (0 pts)] : Never (0 points) [No] : In the past 12 months have you used drugs other than those required for medical reasons? No [] : No [Audit-CScore] : 0

## 2020-01-21 NOTE — ASSESSMENT
[FreeTextEntry1] : Depo Medrol IM\par start Prednisone tomorrow\par Abx\par Hydromet PRN  - no driving ,no alcohol \par CT chest r/o Pneumonia, f/iu lung nodule \par if SOB RTO ASAP or ED eval \par

## 2020-01-30 ENCOUNTER — APPOINTMENT (OUTPATIENT)
Dept: FAMILY MEDICINE | Facility: CLINIC | Age: 75
End: 2020-01-30
Payer: MEDICARE

## 2020-01-30 VITALS
SYSTOLIC BLOOD PRESSURE: 150 MMHG | HEIGHT: 67 IN | BODY MASS INDEX: 25.43 KG/M2 | TEMPERATURE: 97.8 F | OXYGEN SATURATION: 97 % | HEART RATE: 70 BPM | DIASTOLIC BLOOD PRESSURE: 72 MMHG | RESPIRATION RATE: 18 BRPM | WEIGHT: 162 LBS

## 2020-01-30 DIAGNOSIS — R93.89 ABNORMAL FINDINGS ON DIAGNOSTIC IMAGING OF OTHER SPECIFIED BODY STRUCTURES: ICD-10-CM

## 2020-01-30 PROCEDURE — 99214 OFFICE O/P EST MOD 30 MIN: CPT

## 2020-01-30 RX ORDER — PREDNISONE 20 MG/1
20 TABLET ORAL DAILY
Qty: 5 | Refills: 0 | Status: COMPLETED | COMMUNITY
Start: 2020-01-21 | End: 2020-01-30

## 2020-01-30 RX ORDER — AMOXICILLIN AND CLAVULANATE POTASSIUM 875; 125 MG/1; MG/1
875-125 TABLET, COATED ORAL
Qty: 14 | Refills: 0 | Status: COMPLETED | COMMUNITY
Start: 2020-01-21 | End: 2020-01-30

## 2020-02-02 PROBLEM — R93.89 ABNORMAL THYROID ULTRASOUND: Status: ACTIVE | Noted: 2020-01-30

## 2020-02-02 NOTE — COUNSELING
[Fall prevention counseling provided] : Fall prevention counseling provided [Adequate lighting] : Adequate lighting [Use proper foot wear] : Use proper foot wear [Use recommended devices] : Use recommended devices [Good understanding] : Patient has a good understanding of lifestyle changes and steps needed to achieve self management goal

## 2020-02-02 NOTE — HISTORY OF PRESENT ILLNESS
[FreeTextEntry1] : cc: f/u bronchitis, abnormal CT - lung  and thyroid nodule  [de-identified] : Patient came to f/u on his bronchitis  - doing better, still weak , no fever, no chills,no SOB,no CP. Patient had CT chest  + stable nodules, + acute bronchitis - pt was on Abx when did it, now completed. CT show coronary arteries calcification .Patient deneis fever, chills, no wheezing

## 2020-02-02 NOTE — REVIEW OF SYSTEMS
[Fatigue] : fatigue [Cough] : cough [Negative] : Neurological [Chills] : no chills [Shortness Of Breath] : no shortness of breath [Wheezing] : no wheezing [Dyspnea on Exertion] : not dyspnea on exertion

## 2020-02-02 NOTE — HEALTH RISK ASSESSMENT
[Yes] : Yes [Monthly or less (1 pt)] : Monthly or less (1 point) [1 or 2 (0 pts)] : 1 or 2 (0 points) [Never (0 pts)] : Never (0 points) [] : No

## 2020-02-02 NOTE — PHYSICAL EXAM
[No Respiratory Distress] : no respiratory distress  [No Accessory Muscle Use] : no accessory muscle use [No Varicosities] : no varicosities [No Edema] : there was no peripheral edema [Normal] : no rash [de-identified] : right posterior  rhonchi

## 2020-02-05 ENCOUNTER — FORM ENCOUNTER (OUTPATIENT)
Age: 75
End: 2020-02-05

## 2020-02-06 ENCOUNTER — APPOINTMENT (OUTPATIENT)
Dept: ULTRASOUND IMAGING | Facility: HOSPITAL | Age: 75
End: 2020-02-06

## 2020-02-06 ENCOUNTER — OUTPATIENT (OUTPATIENT)
Dept: OUTPATIENT SERVICES | Facility: HOSPITAL | Age: 75
LOS: 1 days | End: 2020-02-06
Payer: MEDICARE

## 2020-02-06 DIAGNOSIS — Z00.8 ENCOUNTER FOR OTHER GENERAL EXAMINATION: ICD-10-CM

## 2020-02-06 PROCEDURE — 76536 US EXAM OF HEAD AND NECK: CPT

## 2020-02-06 PROCEDURE — 76536 US EXAM OF HEAD AND NECK: CPT | Mod: 26

## 2020-02-20 ENCOUNTER — APPOINTMENT (OUTPATIENT)
Dept: PULMONOLOGY | Facility: CLINIC | Age: 75
End: 2020-02-20
Payer: MEDICARE

## 2020-02-20 VITALS
HEIGHT: 67 IN | OXYGEN SATURATION: 98 % | WEIGHT: 160 LBS | HEART RATE: 76 BPM | BODY MASS INDEX: 25.11 KG/M2 | SYSTOLIC BLOOD PRESSURE: 142 MMHG | DIASTOLIC BLOOD PRESSURE: 80 MMHG | RESPIRATION RATE: 17 BRPM

## 2020-02-20 PROCEDURE — 99204 OFFICE O/P NEW MOD 45 MIN: CPT

## 2020-02-20 NOTE — PHYSICAL EXAM
[No Acute Distress] : no acute distress [Normal Oropharynx] : normal oropharynx [No Neck Mass] : no neck mass [Normal Appearance] : normal appearance [Normal Rate/Rhythm] : normal rate/rhythm [Normal S1, S2] : normal s1, s2 [No Resp Distress] : no resp distress [No Murmurs] : no murmurs [Clear to Auscultation Bilaterally] : clear to auscultation bilaterally [No Abnormalities] : no abnormalities [Benign] : benign [Normal Gait] : normal gait [No Clubbing] : no clubbing [No Cyanosis] : no cyanosis [No Edema] : no edema [FROM] : FROM [Normal Color/ Pigmentation] : normal color/ pigmentation [Oriented x3] : oriented x3 [No Focal Deficits] : no focal deficits [Normal Affect] : normal affect

## 2020-10-13 ENCOUNTER — APPOINTMENT (OUTPATIENT)
Dept: FAMILY MEDICINE | Facility: CLINIC | Age: 75
End: 2020-10-13
Payer: MEDICARE

## 2020-10-13 VITALS
DIASTOLIC BLOOD PRESSURE: 78 MMHG | BODY MASS INDEX: 25.11 KG/M2 | OXYGEN SATURATION: 96 % | HEART RATE: 95 BPM | SYSTOLIC BLOOD PRESSURE: 148 MMHG | HEIGHT: 67 IN | TEMPERATURE: 98.4 F | WEIGHT: 160 LBS | RESPIRATION RATE: 16 BRPM

## 2020-10-13 DIAGNOSIS — Z11.59 ENCOUNTER FOR SCREENING FOR OTHER VIRAL DISEASES: ICD-10-CM

## 2020-10-13 PROCEDURE — 99214 OFFICE O/P EST MOD 30 MIN: CPT

## 2020-10-13 RX ORDER — BENZOCAINE/MENTH/CETYLPYRD CL 15 MG-2 MG
10-2.1 LOZENGE MUCOUS MEMBRANE
Qty: 1 | Refills: 1 | Status: COMPLETED | COMMUNITY
Start: 2020-01-06 | End: 2020-10-13

## 2020-10-13 NOTE — HEALTH RISK ASSESSMENT
[Yes] : Yes [Monthly or less (1 pt)] : Monthly or less (1 point) [1 or 2 (0 pts)] : 1 or 2 (0 points) [Never (0 pts)] : Never (0 points) [No] : In the past 12 months have you used drugs other than those required for medical reasons? No [No falls in past year] : Patient reported no falls in the past year [0] : 2) Feeling down, depressed, or hopeless: Not at all (0) [] : No [Audit-CScore] : 0 [de-identified] : walking  [de-identified] : regular  [DCA1Qnpnk] : 0

## 2020-10-13 NOTE — COUNSELING
[Fall prevention counseling provided] : Fall prevention counseling provided [Adequate lighting] : Adequate lighting [No throw rugs] : No throw rugs [Use proper foot wear] : Use proper foot wear [Good understanding] : Patient has a good understanding of lifestyle changes and steps needed to achieve self management goal

## 2020-10-13 NOTE — PHYSICAL EXAM
[No Acute Distress] : no acute distress [Normal Outer Ear/Nose] : the outer ears and nose were normal in appearance [No Respiratory Distress] : no respiratory distress  [No Varicosities] : no varicosities [No Edema] : there was no peripheral edema [Alert and Oriented x3] : oriented to person, place, and time [Normal] : affect was normal and insight and judgment were intact [de-identified] : + pharynx erythema  [de-identified] : right posterior rhonchi

## 2020-10-13 NOTE — REVIEW OF SYSTEMS
[Fatigue] : fatigue [Postnasal Drip] : postnasal drip [Nasal Discharge] : nasal discharge [Sore Throat] : sore throat [Cough] : cough [Negative] : Heme/Lymph [Fever] : no fever [Chills] : no chills [Earache] : no earache [Nosebleeds] : no nosebleeds [Shortness Of Breath] : no shortness of breath [Wheezing] : no wheezing [Dyspnea on Exertion] : not dyspnea on exertion

## 2020-10-13 NOTE — HISTORY OF PRESENT ILLNESS
[FreeTextEntry8] : cc: cough ,congestion\par Patient came c/o congestion and productive cough for the past 5 days,no fever no chills,He is taking care of his grandchildren and the are sick too,Deneis SOB,no abd pain,no wheezing. Pt cough is worse at night,using OTC meds ,no improvement.Last yeas Hx of Pneumonia.

## 2020-10-20 ENCOUNTER — APPOINTMENT (OUTPATIENT)
Dept: ULTRASOUND IMAGING | Facility: HOSPITAL | Age: 75
End: 2020-10-20
Payer: MEDICARE

## 2020-10-20 ENCOUNTER — OUTPATIENT (OUTPATIENT)
Dept: OUTPATIENT SERVICES | Facility: HOSPITAL | Age: 75
LOS: 1 days | End: 2020-10-20
Payer: MEDICARE

## 2020-10-20 DIAGNOSIS — E04.1 NONTOXIC SINGLE THYROID NODULE: ICD-10-CM

## 2020-10-20 PROCEDURE — 76536 US EXAM OF HEAD AND NECK: CPT

## 2020-10-20 PROCEDURE — 76536 US EXAM OF HEAD AND NECK: CPT | Mod: 26

## 2020-10-24 LAB
ALBUMIN SERPL ELPH-MCNC: 4.3 G/DL
ALP BLD-CCNC: 79 U/L
ALT SERPL-CCNC: 12 U/L
ANION GAP SERPL CALC-SCNC: 13 MMOL/L
APPEARANCE: CLEAR
AST SERPL-CCNC: 14 U/L
BASOPHILS # BLD AUTO: 0.04 K/UL
BASOPHILS NFR BLD AUTO: 0.7 %
BILIRUB SERPL-MCNC: 0.8 MG/DL
BILIRUBIN URINE: NEGATIVE
BLOOD URINE: NEGATIVE
BUN SERPL-MCNC: 18 MG/DL
CALCIUM SERPL-MCNC: 9.6 MG/DL
CHLORIDE SERPL-SCNC: 104 MMOL/L
CHOLEST SERPL-MCNC: 154 MG/DL
CO2 SERPL-SCNC: 26 MMOL/L
COLOR: YELLOW
CREAT SERPL-MCNC: 0.96 MG/DL
CREAT SPEC-SCNC: 211 MG/DL
EOSINOPHIL # BLD AUTO: 0.21 K/UL
EOSINOPHIL NFR BLD AUTO: 3.8 %
ESTIMATED AVERAGE GLUCOSE: 105 MG/DL
FOLATE SERPL-MCNC: 9.6 NG/ML
GLUCOSE QUALITATIVE U: NEGATIVE
GLUCOSE SERPL-MCNC: 89 MG/DL
HBA1C MFR BLD HPLC: 5.3 %
HCT VFR BLD CALC: 47 %
HDLC SERPL-MCNC: 52 MG/DL
HGB BLD-MCNC: 15.2 G/DL
IMM GRANULOCYTES NFR BLD AUTO: 0.2 %
KETONES URINE: NEGATIVE
LDLC SERPL CALC-MCNC: 85 MG/DL
LEUKOCYTE ESTERASE URINE: NEGATIVE
LYMPHOCYTES # BLD AUTO: 1.67 K/UL
LYMPHOCYTES NFR BLD AUTO: 30 %
MAN DIFF?: NORMAL
MCHC RBC-ENTMCNC: 31.4 PG
MCHC RBC-ENTMCNC: 32.3 GM/DL
MCV RBC AUTO: 97.1 FL
MICROALBUMIN 24H UR DL<=1MG/L-MCNC: <1.2 MG/DL
MICROALBUMIN/CREAT 24H UR-RTO: NORMAL MG/G
MONOCYTES # BLD AUTO: 0.47 K/UL
MONOCYTES NFR BLD AUTO: 8.5 %
NEUTROPHILS # BLD AUTO: 3.16 K/UL
NEUTROPHILS NFR BLD AUTO: 56.8 %
NITRITE URINE: NEGATIVE
NONHDLC SERPL-MCNC: 101 MG/DL
PH URINE: 6
PLATELET # BLD AUTO: 245 K/UL
POTASSIUM SERPL-SCNC: 5.3 MMOL/L
PROT SERPL-MCNC: 6.9 G/DL
PROTEIN URINE: NORMAL
PSA SERPL-MCNC: 2.12 NG/ML
RBC # BLD: 4.84 M/UL
RBC # FLD: 12.4 %
SARS-COV-2 IGG SERPL IA-ACNC: <0.1 INDEX
SARS-COV-2 IGG SERPL QL IA: NEGATIVE
SODIUM SERPL-SCNC: 143 MMOL/L
SPECIFIC GRAVITY URINE: 1.02
TRIGL SERPL-MCNC: 84 MG/DL
TSH SERPL-ACNC: 1.39 UIU/ML
URATE SERPL-MCNC: 7.3 MG/DL
UROBILINOGEN URINE: NORMAL
VIT B12 SERPL-MCNC: 423 PG/ML
WBC # FLD AUTO: 5.56 K/UL

## 2020-11-10 ENCOUNTER — APPOINTMENT (OUTPATIENT)
Dept: FAMILY MEDICINE | Facility: CLINIC | Age: 75
End: 2020-11-10
Payer: MEDICARE

## 2020-11-10 VITALS
SYSTOLIC BLOOD PRESSURE: 139 MMHG | OXYGEN SATURATION: 96 % | TEMPERATURE: 98.2 F | WEIGHT: 159 LBS | RESPIRATION RATE: 14 BRPM | DIASTOLIC BLOOD PRESSURE: 72 MMHG | BODY MASS INDEX: 24.96 KG/M2 | HEART RATE: 79 BPM | HEIGHT: 67 IN

## 2020-11-10 DIAGNOSIS — R19.7 DIARRHEA, UNSPECIFIED: ICD-10-CM

## 2020-11-10 PROCEDURE — 99214 OFFICE O/P EST MOD 30 MIN: CPT | Mod: 25

## 2020-11-10 PROCEDURE — 99072 ADDL SUPL MATRL&STAF TM PHE: CPT

## 2020-11-10 PROCEDURE — 36415 COLL VENOUS BLD VENIPUNCTURE: CPT

## 2020-11-10 RX ORDER — AMOXICILLIN AND CLAVULANATE POTASSIUM 875; 125 MG/1; MG/1
875-125 TABLET, COATED ORAL
Qty: 14 | Refills: 0 | Status: COMPLETED | COMMUNITY
Start: 2020-10-13 | End: 2020-11-10

## 2020-11-10 NOTE — PHYSICAL EXAM
[No Varicosities] : no varicosities [No Edema] : there was no peripheral edema [Normal Sphincter Tone] : normal sphincter tone [No Mass] : no mass [Normal] : no rash [FreeTextEntry1] : no blood ,+ external hemorrhoids

## 2020-11-10 NOTE — HISTORY OF PRESENT ILLNESS
[FreeTextEntry8] : Encounter conducted in Polish.\par \par cc: diarrhea\par \par Patient c/o diarrhea for the past 5 days, + blood, + mucase,  deneis fever,no chills, + HA  on and off on day prior , deneis cough, no IVD contact, he had 6-8 BM a day, today stool is more formed , still + blood  on and off, deneis travel.  Patient deneis abd pain,no CP, last colonoscopy 5 years ago, WNL.

## 2020-11-10 NOTE — REVIEW OF SYSTEMS
[Abdominal Pain] : abdominal pain [Diarrhea] : diarrhea [Negative] : Neurological [FreeTextEntry7] : + blood, mucase  in the stool

## 2020-11-10 NOTE — HEALTH RISK ASSESSMENT
[Yes] : Yes [Monthly or less (1 pt)] : Monthly or less (1 point) [1 or 2 (0 pts)] : 1 or 2 (0 points) [Never (0 pts)] : Never (0 points) [No] : In the past 12 months have you used drugs other than those required for medical reasons? No [No falls in past year] : Patient reported no falls in the past year [] : No [Audit-CScore] : 0 [de-identified] : walking  [de-identified] : low residual

## 2020-11-11 DIAGNOSIS — A49.8 OTHER BACTERIAL INFECTIONS OF UNSPECIFIED SITE: ICD-10-CM

## 2020-11-11 LAB
ALBUMIN SERPL ELPH-MCNC: 4.6 G/DL
ALP BLD-CCNC: 92 U/L
ALT SERPL-CCNC: 12 U/L
AMYLASE/CREAT SERPL: 58 U/L
ANION GAP SERPL CALC-SCNC: 13 MMOL/L
AST SERPL-CCNC: 17 U/L
BASOPHILS # BLD AUTO: 0.05 K/UL
BASOPHILS NFR BLD AUTO: 0.5 %
BILIRUB SERPL-MCNC: 0.3 MG/DL
BUN SERPL-MCNC: 11 MG/DL
C DIFF TOX GENS STL QL NAA+PROBE: NORMAL
CALCIUM SERPL-MCNC: 9.6 MG/DL
CDIFF BY PCR: DETECTED
CHLORIDE SERPL-SCNC: 102 MMOL/L
CO2 SERPL-SCNC: 26 MMOL/L
CREAT SERPL-MCNC: 0.87 MG/DL
EOSINOPHIL # BLD AUTO: 0.17 K/UL
EOSINOPHIL NFR BLD AUTO: 1.8 %
GLUCOSE SERPL-MCNC: 95 MG/DL
HCT VFR BLD CALC: 45.1 %
HGB BLD-MCNC: 14.1 G/DL
IMM GRANULOCYTES NFR BLD AUTO: 0.2 %
LPL SERPL-CCNC: 67 U/L
LYMPHOCYTES # BLD AUTO: 1.8 K/UL
LYMPHOCYTES NFR BLD AUTO: 18.5 %
MAN DIFF?: NORMAL
MCHC RBC-ENTMCNC: 30.7 PG
MCHC RBC-ENTMCNC: 31.3 GM/DL
MCV RBC AUTO: 98 FL
MONOCYTES # BLD AUTO: 0.91 K/UL
MONOCYTES NFR BLD AUTO: 9.4 %
NEUTROPHILS # BLD AUTO: 6.76 K/UL
NEUTROPHILS NFR BLD AUTO: 69.6 %
PLATELET # BLD AUTO: 256 K/UL
POTASSIUM SERPL-SCNC: 4.7 MMOL/L
PROT SERPL-MCNC: 7.1 G/DL
RBC # BLD: 4.6 M/UL
RBC # FLD: 12.4 %
SARS-COV-2 IGG SERPL IA-ACNC: <0.1 INDEX
SARS-COV-2 IGG SERPL QL IA: NEGATIVE
SODIUM SERPL-SCNC: 141 MMOL/L
WBC # FLD AUTO: 9.71 K/UL

## 2020-11-16 LAB — H PYLORI AG STL QL: NOT DETECTED

## 2020-12-17 ENCOUNTER — APPOINTMENT (OUTPATIENT)
Dept: FAMILY MEDICINE | Facility: CLINIC | Age: 75
End: 2020-12-17
Payer: MEDICARE

## 2020-12-17 ENCOUNTER — NON-APPOINTMENT (OUTPATIENT)
Age: 75
End: 2020-12-17

## 2020-12-17 VITALS
DIASTOLIC BLOOD PRESSURE: 72 MMHG | HEART RATE: 71 BPM | RESPIRATION RATE: 15 BRPM | BODY MASS INDEX: 24.64 KG/M2 | SYSTOLIC BLOOD PRESSURE: 130 MMHG | TEMPERATURE: 97.7 F | HEIGHT: 67 IN | OXYGEN SATURATION: 96 % | WEIGHT: 157 LBS

## 2020-12-17 DIAGNOSIS — R74.8 ABNORMAL LEVELS OF OTHER SERUM ENZYMES: ICD-10-CM

## 2020-12-17 DIAGNOSIS — Z23 ENCOUNTER FOR IMMUNIZATION: ICD-10-CM

## 2020-12-17 PROCEDURE — G0009: CPT

## 2020-12-17 PROCEDURE — 99072 ADDL SUPL MATRL&STAF TM PHE: CPT

## 2020-12-17 PROCEDURE — 93000 ELECTROCARDIOGRAM COMPLETE: CPT

## 2020-12-17 PROCEDURE — 36415 COLL VENOUS BLD VENIPUNCTURE: CPT

## 2020-12-17 PROCEDURE — 90732 PPSV23 VACC 2 YRS+ SUBQ/IM: CPT | Mod: 59

## 2020-12-17 PROCEDURE — 99213 OFFICE O/P EST LOW 20 MIN: CPT | Mod: 25

## 2020-12-17 PROCEDURE — G0439: CPT

## 2020-12-17 NOTE — DATA REVIEWED
[FreeTextEntry1] : last blood work d/w the pt at length\par \par EKG - NSR at 70 bpm, no acute changes

## 2020-12-17 NOTE — HEALTH RISK ASSESSMENT
[Very Good] : ~his/her~  mood as very good [Yes] : Yes [Monthly or less (1 pt)] : Monthly or less (1 point) [1 or 2 (0 pts)] : 1 or 2 (0 points) [Never (0 pts)] : Never (0 points) [No] : In the past 12 months have you used drugs other than those required for medical reasons? No [No falls in past year] : Patient reported no falls in the past year [0] : 2) Feeling down, depressed, or hopeless: Not at all (0) [None] : None [With Significant Other] : lives with significant other [With Family] : lives with family [Employed] : employed [College] : College [] :  [# Of Children ___] : has [unfilled] children [Feels Safe at Home] : Feels safe at home [Independent] : managing finances [Carbon Monoxide Detector] : carbon monoxide detector [Seat Belt] :  uses seat belt [Sunscreen] : uses sunscreen [With Patient/Caregiver] : With Patient/Caregiver [Aggressive treatment] : aggressive treatment [FreeTextEntry1] : none [] : No [de-identified] : No [de-identified] : LEATHA  [Audit-CScore] : 1 [de-identified] : walking , exercise  [de-identified] : healthy  [LHS2Eilkl] : 0 [Patient reported colonoscopy was normal] : Patient reported colonoscopy was normal [Change in mental status noted] : No change in mental status noted [Language] : denies difficulty with language [Reports changes in hearing] : Reports no changes in hearing [Reports changes in vision] : Reports no changes in vision [Reports changes in dental health] : Reports no changes in dental health [Smoke Detector] : no smoke detector [MammogramDate] : NA [PapSmearDate] : NA [BoneDensityDate] : never [ColonoscopyDate] : 02/15 [HIVDate] : 08/18 [HepatitisCDate] : 12/18 [AdvancecareDate] : 12/20

## 2020-12-17 NOTE — REVIEW OF SYSTEMS
[Frequency] : frequency [Negative] : Psychiatric [Dysuria] : no dysuria [Incontinence] : no incontinence [Hesitancy] : no hesitancy [Nocturia] : no nocturia [Hematuria] : no hematuria [Impotence] : no impotency [Poor Libido] : libido not poor

## 2020-12-17 NOTE — HISTORY OF PRESENT ILLNESS
[FreeTextEntry1] : cc: physical , urinary frequency  [de-identified] : Patient came for physical. Denies CP,SOB,Abd pain, no N,V,C,D. PAtient with Hx of C.[email or web address], completed RX, f/u Colonoscopy /EGD on 12/21/2020. PAtient c/o urinary frequency, no back pain, no fever, no chills, no abd pain - 10 days. Pt f/u with  , seen 1 year ago \par \par

## 2020-12-17 NOTE — PHYSICAL EXAM
[No Acute Distress] : no acute distress [Well Nourished] : well nourished [Well Developed] : well developed [Well-Appearing] : well-appearing [Normal Sclera/Conjunctiva] : normal sclera/conjunctiva [PERRL] : pupils equal round and reactive to light [EOMI] : extraocular movements intact [Normal Outer Ear/Nose] : the outer ears and nose were normal in appearance [Normal Oropharynx] : the oropharynx was normal [No JVD] : no jugular venous distention [No Lymphadenopathy] : no lymphadenopathy [Supple] : supple [No Respiratory Distress] : no respiratory distress  [No Accessory Muscle Use] : no accessory muscle use [Clear to Auscultation] : lungs were clear to auscultation bilaterally [Normal Rate] : normal rate  [Regular Rhythm] : with a regular rhythm [Normal S1, S2] : normal S1 and S2 [No Murmur] : no murmur heard [No Varicosities] : no varicosities [No Edema] : there was no peripheral edema [Soft] : abdomen soft [Non Tender] : non-tender [Non-distended] : non-distended [No Masses] : no abdominal mass palpated [No HSM] : no HSM [Normal Bowel Sounds] : normal bowel sounds [Normal Posterior Cervical Nodes] : no posterior cervical lymphadenopathy [Normal Anterior Cervical Nodes] : no anterior cervical lymphadenopathy [No CVA Tenderness] : no CVA  tenderness [No Spinal Tenderness] : no spinal tenderness [No Joint Swelling] : no joint swelling [Grossly Normal Strength/Tone] : grossly normal strength/tone [No Rash] : no rash [Coordination Grossly Intact] : coordination grossly intact [No Focal Deficits] : no focal deficits [Normal Gait] : normal gait [Deep Tendon Reflexes (DTR)] : deep tendon reflexes were 2+ and symmetric [Alert and Oriented x3] : oriented to person, place, and time [Declined Rectal Exam] : declined rectal exam [Normal] : affect was normal and insight and judgment were intact [de-identified] : declined - done by LEATHA 2019, he will f/u with LEATHA

## 2020-12-18 LAB
ALBUMIN SERPL ELPH-MCNC: 4.8 G/DL
ALP BLD-CCNC: 88 U/L
ALT SERPL-CCNC: 12 U/L
ANION GAP SERPL CALC-SCNC: 13 MMOL/L
APPEARANCE: CLEAR
AST SERPL-CCNC: 15 U/L
BASOPHILS # BLD AUTO: 0.04 K/UL
BASOPHILS NFR BLD AUTO: 0.4 %
BILIRUB SERPL-MCNC: 0.6 MG/DL
BILIRUBIN URINE: NEGATIVE
BLOOD URINE: NEGATIVE
BUN SERPL-MCNC: 17 MG/DL
CALCIUM SERPL-MCNC: 9.9 MG/DL
CHLORIDE SERPL-SCNC: 101 MMOL/L
CO2 SERPL-SCNC: 24 MMOL/L
COLOR: COLORLESS
CREAT SERPL-MCNC: 0.93 MG/DL
EOSINOPHIL # BLD AUTO: 0.07 K/UL
EOSINOPHIL NFR BLD AUTO: 0.7 %
GLUCOSE QUALITATIVE U: NEGATIVE
GLUCOSE SERPL-MCNC: 94 MG/DL
HCT VFR BLD CALC: 44.6 %
HGB BLD-MCNC: 14.8 G/DL
IMM GRANULOCYTES NFR BLD AUTO: 0.2 %
KETONES URINE: NEGATIVE
LEUKOCYTE ESTERASE URINE: NEGATIVE
LPL SERPL-CCNC: 59 U/L
LYMPHOCYTES # BLD AUTO: 1.7 K/UL
LYMPHOCYTES NFR BLD AUTO: 16.8 %
MAN DIFF?: NORMAL
MCHC RBC-ENTMCNC: 31.3 PG
MCHC RBC-ENTMCNC: 33.2 GM/DL
MCV RBC AUTO: 94.3 FL
MONOCYTES # BLD AUTO: 0.72 K/UL
MONOCYTES NFR BLD AUTO: 7.1 %
NEUTROPHILS # BLD AUTO: 7.54 K/UL
NEUTROPHILS NFR BLD AUTO: 74.8 %
NITRITE URINE: NEGATIVE
PH URINE: 6
PLATELET # BLD AUTO: 251 K/UL
POTASSIUM SERPL-SCNC: 4.3 MMOL/L
PROT SERPL-MCNC: 7.4 G/DL
PROTEIN URINE: NEGATIVE
RBC # BLD: 4.73 M/UL
RBC # FLD: 12.5 %
SODIUM SERPL-SCNC: 139 MMOL/L
SPECIFIC GRAVITY URINE: 1
UROBILINOGEN URINE: NORMAL
WBC # FLD AUTO: 10.09 K/UL

## 2020-12-21 PROBLEM — Z87.09 HISTORY OF ACUTE SINUSITIS: Status: RESOLVED | Noted: 2019-10-17 | Resolved: 2020-12-21

## 2020-12-23 PROBLEM — Z87.09 HISTORY OF SORE THROAT: Status: RESOLVED | Noted: 2020-01-06 | Resolved: 2020-12-23

## 2020-12-23 PROBLEM — J06.9 ACUTE URI: Status: RESOLVED | Noted: 2020-01-06 | Resolved: 2020-12-23

## 2020-12-31 ENCOUNTER — OUTPATIENT (OUTPATIENT)
Dept: OUTPATIENT SERVICES | Facility: HOSPITAL | Age: 75
LOS: 1 days | End: 2020-12-31
Payer: MEDICARE

## 2020-12-31 ENCOUNTER — APPOINTMENT (OUTPATIENT)
Dept: RADIOLOGY | Facility: HOSPITAL | Age: 75
End: 2020-12-31
Payer: MEDICARE

## 2020-12-31 DIAGNOSIS — Z23 ENCOUNTER FOR IMMUNIZATION: ICD-10-CM

## 2020-12-31 PROCEDURE — 77080 DXA BONE DENSITY AXIAL: CPT | Mod: 26

## 2020-12-31 PROCEDURE — 77080 DXA BONE DENSITY AXIAL: CPT

## 2021-04-15 ENCOUNTER — TRANSCRIPTION ENCOUNTER (OUTPATIENT)
Age: 76
End: 2021-04-15

## 2021-06-16 ENCOUNTER — APPOINTMENT (OUTPATIENT)
Dept: FAMILY MEDICINE | Facility: CLINIC | Age: 76
End: 2021-06-16
Payer: MEDICARE

## 2021-06-16 VITALS
DIASTOLIC BLOOD PRESSURE: 60 MMHG | RESPIRATION RATE: 15 BRPM | TEMPERATURE: 97.7 F | WEIGHT: 158 LBS | BODY MASS INDEX: 24.8 KG/M2 | OXYGEN SATURATION: 96 % | HEART RATE: 84 BPM | HEIGHT: 67 IN | SYSTOLIC BLOOD PRESSURE: 130 MMHG

## 2021-06-16 PROCEDURE — 99213 OFFICE O/P EST LOW 20 MIN: CPT

## 2021-06-16 RX ORDER — METRONIDAZOLE 500 MG/1
500 TABLET ORAL EVERY 8 HOURS
Qty: 30 | Refills: 0 | Status: COMPLETED | COMMUNITY
Start: 2020-11-11 | End: 2021-06-16

## 2021-06-16 NOTE — ASSESSMENT
[FreeTextEntry1] : Abx, \par Tessalon cap PRN \par rest \par  supportive care d/w the pt at length \par COVID PCR \par if no improvement CXR and RTO for further eval\par

## 2021-06-16 NOTE — REVIEW OF SYSTEMS
[Postnasal Drip] : postnasal drip [Nasal Discharge] : nasal discharge [Sore Throat] : sore throat [Hoarseness] : hoarseness [Cough] : cough [Negative] : Psychiatric [Fever] : no fever [Chills] : no chills [Fatigue] : no fatigue [Earache] : no earache [Nosebleeds] : no nosebleeds [Shortness Of Breath] : no shortness of breath [Wheezing] : no wheezing [Dyspnea on Exertion] : not dyspnea on exertion

## 2021-06-16 NOTE — PHYSICAL EXAM
[No Acute Distress] : no acute distress [No Respiratory Distress] : no respiratory distress  [No Varicosities] : no varicosities [No Edema] : there was no peripheral edema [Deep Tendon Reflexes (DTR)] : deep tendon reflexes were 2+ and symmetric [Normal] : normal gait, coordination grossly intact, no focal deficits and deep tendon reflexes were 2+ and symmetric [de-identified] : right lower lobe rhonchi

## 2021-06-16 NOTE — HISTORY OF PRESENT ILLNESS
[FreeTextEntry8] : cc: head congestion\par Patient came c/o head congestion, deneis CP, no SOB,no HA, + chest congestion, + horse voice, deneis fever, no chills today, + dry cough on and off, few days , took OTC meds, mild improvement.

## 2021-06-17 LAB — SARS-COV-2 N GENE NPH QL NAA+PROBE: NOT DETECTED

## 2021-07-31 ENCOUNTER — EMERGENCY (EMERGENCY)
Facility: HOSPITAL | Age: 76
LOS: 1 days | Discharge: HOME | End: 2021-07-31
Attending: EMERGENCY MEDICINE | Admitting: EMERGENCY MEDICINE
Payer: MEDICARE

## 2021-07-31 VITALS
HEIGHT: 67 IN | DIASTOLIC BLOOD PRESSURE: 85 MMHG | HEART RATE: 90 BPM | SYSTOLIC BLOOD PRESSURE: 173 MMHG | TEMPERATURE: 98 F | RESPIRATION RATE: 18 BRPM | WEIGHT: 156.97 LBS | OXYGEN SATURATION: 98 %

## 2021-07-31 PROCEDURE — 99283 EMERGENCY DEPT VISIT LOW MDM: CPT

## 2021-07-31 RX ORDER — CEPHALEXIN 500 MG
1 CAPSULE ORAL
Qty: 30 | Refills: 0
Start: 2021-07-31 | End: 2021-08-09

## 2021-07-31 RX ORDER — CEPHALEXIN 500 MG
500 CAPSULE ORAL ONCE
Refills: 0 | Status: COMPLETED | OUTPATIENT
Start: 2021-07-31 | End: 2021-07-31

## 2021-07-31 RX ADMIN — Medication 1 APPLICATION(S): at 20:52

## 2021-07-31 RX ADMIN — Medication 500 MILLIGRAM(S): at 20:53

## 2021-07-31 NOTE — ED PROVIDER NOTE - CARE PROVIDER_API CALL
Ousmane Bond)  Dermatology  [address], [address]  [place], [place] [id number]  Phone: [phone]  Fax: [phone]  Follow Up Time:

## 2021-07-31 NOTE — ED PROVIDER NOTE - PHYSICAL EXAMINATION
General:     NAD, well-nourished, well-appearing  Head:     NC/AT, EOMI, oral mucosa moist  Neck:     supple  Lungs:     CTA b/l, no w/r/r  CVS:     S1S2, RRR, no m/g/r  Abd:     +BS, s/nt/nd, no organomegaly  Ext:    2+ radial and pedal pulses, no c/c/e  Neuro: grossly intact  skin: mild to mod skin excoriation around paronychia ao toes

## 2021-07-31 NOTE — ED ADULT TRIAGE NOTE - CHIEF COMPLAINT QUOTE
Pt c/o he noticed swelling and redness to his toes on b/l legs since Thursday. Denies pain, fevers. Pt c/o he noticed swelling and redness to his toes on b/l legs since Thursday. Denies pain, fevers. ISAR negative.

## 2021-07-31 NOTE — ED ADULT NURSE NOTE - NSFALLRSKINDICATORS_ED_ALL_ED
. Acetylcholine binding Ab 478 (0--0.4), blocking Ab 63 (0-26 %) , striated muscle Ab <1:40 .        Past Medical History:   Diagnosis Date    Anemia     Headache     Hypertension     Myasthenia gravis ([address]     Pre-diabetes        Past Surgical History:   Procedure Laterality Date    CARDIAC SURGERY         History reviewed. No pertinent family history. Social History     Social History    Marital status: Single     Spouse name: N/A    Number of children: N/A    Years of education: N/A     Social History Main Topics    Smoking status: Never Smoker    Smokeless tobacco: Never Used    Alcohol use No    Drug use: No    Sexual activity: No     Other Topics Concern    None     Social History Narrative    None       Current Outpatient Prescriptions   Medication Sig Dispense Refill    predniSONE (DELTASONE) 20 MG tablet Take 1 po qd 30 tablet 5    pyridostigmine (MESTINON) 60 MG tablet Take 1 po qid 7 AM , q 11 AM ,q 3 PM, 7  tablet 5    vitamin C (ASCORBIC ACID) 500 MG tablet TAKE ONE TABLET BY MOUTH ONE TIME A DAY 30 tablet 3    ferrous sulfate 325 (65 Fe) MG tablet TAKE 1 TABLET BY MOUTH 2 TIMES DAILY (WITH MEALS) 90 tablet 9    blood glucose monitor strips Test 4 times daily Diagnosis 100 strip 2    Alcohol Swabs PADS USE AS DIRECTED WHEN CHECKING BLOOD SUGAR DAILY. 100 each 3    metFORMIN (GLUCOPHAGE) 500 MG tablet Take 1 tablet by mouth 2 times daily (with meals) 60 tablet 2    ciclopirox (LOPROX) 0.77 % cream Apply topically 2 times daily.  60 g 11    Calcium Carbonate-Vitamin D (OYSTER SHELL CALCIUM/D) 500-200 MG-UNIT TABS Take 2 tablets by mouth daily 60 tablet 2    ACCU-CHEK SOFTCLIX LANCETS MISC Daily 100 each 3    venlafaxine (EFFEXOR XR) 75 MG extended release capsule Take 1 capsule by mouth daily 30 capsule 3    glucose monitoring kit (FREESTYLE) monitoring kit Daily check 1 kit 0    mycophenolate (CELLCEPT) 500 MG tablet Take 1 po bid (Patient taking
no

## 2021-07-31 NOTE — ED PROVIDER NOTE - OBJECTIVE STATEMENT
75 y/o male presents to Ed c/o excoriations around toes fro past few days, c/o minor itching, no pain. c/o mild serous discharge. denies exposure to water. has toe nail fungus in past

## 2021-07-31 NOTE — ED PROVIDER NOTE - CLINICAL SUMMARY MEDICAL DECISION MAKING FREE TEXT BOX
pt p/w rash and excoriation on both toes for few days, minimal itching , no fever, , likely pt has fungul infection of toe, need to see dermatologist, started on local antifungal cream and oral abx

## 2021-07-31 NOTE — ED PROVIDER NOTE - PATIENT PORTAL LINK FT
You can access the FollowMyHealth Patient Portal offered by [facility] by registering at the following website: [email or web address]. By joining [employer]’s FollowMyHealth portal, you will also be able to view your health information using other applications (apps) compatible with our system.

## 2021-08-04 NOTE — CHART NOTE - NSCHARTNOTEFT_GEN_A_CORE
SW called patient to discuss and assist with follow up care.  Patient presented to ED on 7/31/21 due to foot pain.  Patient was instructed to follow up with dermatologist and referral was given.  SW spoke with patient who reports he is feeling a bit better.  Patient confirms he saw dermatologist on 8/3/21 and has another appointment scheduled for 8/10/21.  Patient denied any other assistance at this time.

## 2021-08-09 DIAGNOSIS — Z20.822 CONTACT WITH AND (SUSPECTED) EXPOSURE TO COVID-19: ICD-10-CM

## 2021-08-11 LAB — SARS-COV-2 N GENE NPH QL NAA+PROBE: NOT DETECTED

## 2021-12-20 ENCOUNTER — NON-APPOINTMENT (OUTPATIENT)
Age: 76
End: 2021-12-20

## 2021-12-20 ENCOUNTER — APPOINTMENT (OUTPATIENT)
Dept: FAMILY MEDICINE | Facility: CLINIC | Age: 76
End: 2021-12-20
Payer: MEDICARE

## 2021-12-20 VITALS
RESPIRATION RATE: 17 BRPM | HEART RATE: 68 BPM | DIASTOLIC BLOOD PRESSURE: 72 MMHG | BODY MASS INDEX: 23.49 KG/M2 | OXYGEN SATURATION: 98 % | TEMPERATURE: 97.3 F | HEIGHT: 68 IN | SYSTOLIC BLOOD PRESSURE: 128 MMHG | WEIGHT: 155 LBS

## 2021-12-20 DIAGNOSIS — R91.1 SOLITARY PULMONARY NODULE: ICD-10-CM

## 2021-12-20 PROCEDURE — 93000 ELECTROCARDIOGRAM COMPLETE: CPT

## 2021-12-20 PROCEDURE — 36415 COLL VENOUS BLD VENIPUNCTURE: CPT

## 2021-12-20 PROCEDURE — G0439: CPT

## 2021-12-20 RX ORDER — AMOXICILLIN AND CLAVULANATE POTASSIUM 875; 125 MG/1; MG/1
875-125 TABLET, COATED ORAL
Qty: 14 | Refills: 0 | Status: COMPLETED | COMMUNITY
Start: 2021-06-16 | End: 2021-12-20

## 2021-12-20 RX ORDER — BENZONATATE 200 MG/1
200 CAPSULE ORAL 3 TIMES DAILY
Qty: 60 | Refills: 1 | Status: COMPLETED | COMMUNITY
Start: 2021-06-16 | End: 2021-12-20

## 2021-12-20 NOTE — HISTORY OF PRESENT ILLNESS
[FreeTextEntry1] : cc: physical  [de-identified] : Patient presented  for physical.He is  CP,SOB,Abd pain, no N,V,C,D.Pt have not gotten  COVID vacc .Pt f/u with , started on meds for his urinary incontinent  - doing much better \par

## 2021-12-20 NOTE — PHYSICAL EXAM
[Well Nourished] : well nourished [Well Developed] : well developed [Well-Appearing] : well-appearing [Normal Sclera/Conjunctiva] : normal sclera/conjunctiva [PERRL] : pupils equal round and reactive to light [EOMI] : extraocular movements intact [Normal Outer Ear/Nose] : the outer ears and nose were normal in appearance [Normal Oropharynx] : the oropharynx was normal [No JVD] : no jugular venous distention [No Lymphadenopathy] : no lymphadenopathy [Supple] : supple [No Respiratory Distress] : no respiratory distress  [No Accessory Muscle Use] : no accessory muscle use [Clear to Auscultation] : lungs were clear to auscultation bilaterally [Normal Rate] : normal rate  [Regular Rhythm] : with a regular rhythm [Normal S1, S2] : normal S1 and S2 [No Murmur] : no murmur heard [No Varicosities] : no varicosities [No Edema] : there was no peripheral edema [Soft] : abdomen soft [Non Tender] : non-tender [Non-distended] : non-distended [No Masses] : no abdominal mass palpated [No HSM] : no HSM [Normal Bowel Sounds] : normal bowel sounds [Declined Rectal Exam] : declined rectal exam [Normal Posterior Cervical Nodes] : no posterior cervical lymphadenopathy [Normal Anterior Cervical Nodes] : no anterior cervical lymphadenopathy [No CVA Tenderness] : no CVA  tenderness [No Spinal Tenderness] : no spinal tenderness [No Joint Swelling] : no joint swelling [Grossly Normal Strength/Tone] : grossly normal strength/tone [No Rash] : no rash [Coordination Grossly Intact] : coordination grossly intact [No Focal Deficits] : no focal deficits [Normal Gait] : normal gait [Deep Tendon Reflexes (DTR)] : deep tendon reflexes were 2+ and symmetric [Alert and Oriented x3] : oriented to person, place, and time [Normal] : affect was normal and insight and judgment were intact [de-identified] : declined - done by LEATHA 10/21  he will f/u with LEATHA

## 2021-12-20 NOTE — DATA REVIEWED
[FreeTextEntry1] : last blood work ,CT chest d/w the pt at length\par \par EKG - Sinus bradycardia 59  bpm, no acute changes

## 2021-12-20 NOTE — HEALTH RISK ASSESSMENT
[Very Good] : ~his/her~  mood as very good [Never] : Never [Yes] : Yes [Monthly or less (1 pt)] : Monthly or less (1 point) [1 or 2 (0 pts)] : 1 or 2 (0 points) [Never (0 pts)] : Never (0 points) [No] : In the past 12 months have you used drugs other than those required for medical reasons? No [No falls in past year] : Patient reported no falls in the past year [0] : 2) Feeling down, depressed, or hopeless: Not at all (0) [PHQ-2 Negative - No further assessment needed] : PHQ-2 Negative - No further assessment needed [Patient reported colonoscopy was normal] : Patient reported colonoscopy was normal [None] : None [With Significant Other] : lives with significant other [With Family] : lives with family [Employed] : employed [College] : College [] :  [# Of Children ___] : has [unfilled] children [Feels Safe at Home] : Feels safe at home [Independent] : managing finances [Carbon Monoxide Detector] : carbon monoxide detector [Seat Belt] :  uses seat belt [Sunscreen] : uses sunscreen [With Patient/Caregiver] : , with patient/caregiver [Aggressive treatment] : aggressive treatment [FreeTextEntry1] : none [de-identified] : No [de-identified] : LEATHA  [Audit-CScore] : 1 [de-identified] : walking , exercise  [de-identified] : healthy  [[id number]] : 0 [Change in mental status noted] : No change in mental status noted [Language] : denies difficulty with language [Reports changes in hearing] : Reports no changes in hearing [Reports changes in vision] : Reports no changes in vision [Reports changes in dental health] : Reports no changes in dental health [Smoke Detector] : no smoke detector [MammogramDate] : NA [PapSmearDate] : NA [BoneDensityDate] : never [ColonoscopyDate] : 12/20 [HIVDate] : 08/18 [HepatitisCDate] : 12/18 [AdvancecareDate] : 12/20

## 2021-12-22 LAB
ALBUMIN SERPL ELPH-MCNC: 4.8 G/DL
ALP BLD-CCNC: 99 U/L
ALT SERPL-CCNC: 11 U/L
ANION GAP SERPL CALC-SCNC: 13 MMOL/L
APPEARANCE: CLEAR
AST SERPL-CCNC: 16 U/L
BASOPHILS # BLD AUTO: 0.05 K/UL
BASOPHILS NFR BLD AUTO: 0.8 %
BILIRUB SERPL-MCNC: 0.7 MG/DL
BILIRUBIN URINE: NEGATIVE
BLOOD URINE: NEGATIVE
BUN SERPL-MCNC: 16 MG/DL
CALCIUM SERPL-MCNC: 9.7 MG/DL
CHLORIDE SERPL-SCNC: 104 MMOL/L
CHOLEST SERPL-MCNC: 181 MG/DL
CO2 SERPL-SCNC: 24 MMOL/L
COLOR: YELLOW
COVID-19 NUCLEOCAPSID  GAM ANTIBODY INTERPRETATION: NEGATIVE
COVID-19 SPIKE DOMAIN ANTIBODY INTERPRETATION: NEGATIVE
CREAT SERPL-MCNC: 0.98 MG/DL
CREAT SPEC-SCNC: 206 MG/DL
EOSINOPHIL # BLD AUTO: 0.07 K/UL
EOSINOPHIL NFR BLD AUTO: 1.2 %
GLUCOSE QUALITATIVE U: NEGATIVE
GLUCOSE SERPL-MCNC: 100 MG/DL
HCT VFR BLD CALC: 49.6 %
HDLC SERPL-MCNC: 54 MG/DL
HGB BLD-MCNC: 16.3 G/DL
IMM GRANULOCYTES NFR BLD AUTO: 0.2 %
KETONES URINE: NEGATIVE
LDLC SERPL CALC-MCNC: 105 MG/DL
LEUKOCYTE ESTERASE URINE: NEGATIVE
LYMPHOCYTES # BLD AUTO: 1.44 K/UL
LYMPHOCYTES NFR BLD AUTO: 23.9 %
MAN DIFF?: NORMAL
MCHC RBC-ENTMCNC: 32.3 PG
MCHC RBC-ENTMCNC: 32.9 GM/DL
MCV RBC AUTO: 98.4 FL
MICROALBUMIN 24H UR DL<=1MG/L-MCNC: <1.2 MG/DL
MICROALBUMIN/CREAT 24H UR-RTO: NORMAL MG/G
MONOCYTES # BLD AUTO: 0.52 K/UL
MONOCYTES NFR BLD AUTO: 8.6 %
NEUTROPHILS # BLD AUTO: 3.94 K/UL
NEUTROPHILS NFR BLD AUTO: 65.3 %
NITRITE URINE: NEGATIVE
NONHDLC SERPL-MCNC: 127 MG/DL
PH URINE: 5.5
PLATELET # BLD AUTO: 239 K/UL
POTASSIUM SERPL-SCNC: 4.7 MMOL/L
PROT SERPL-MCNC: 7.6 G/DL
PROTEIN URINE: NEGATIVE
RBC # BLD: 5.04 M/UL
RBC # FLD: 12.8 %
SARS-COV-2 AB SERPL IA-ACNC: 0.4 U/ML
SARS-COV-2 AB SERPL QL IA: 0.09 INDEX
SODIUM SERPL-SCNC: 141 MMOL/L
SPECIFIC GRAVITY URINE: 1.02
TRIGL SERPL-MCNC: 112 MG/DL
TSH SERPL-ACNC: 2.17 UIU/ML
URATE SERPL-MCNC: 5.9 MG/DL
UROBILINOGEN URINE: NORMAL
WBC # FLD AUTO: 6.03 K/UL

## 2022-01-03 ENCOUNTER — APPOINTMENT (OUTPATIENT)
Dept: FAMILY MEDICINE | Facility: CLINIC | Age: 77
End: 2022-01-03
Payer: MEDICARE

## 2022-01-03 VITALS
RESPIRATION RATE: 15 BRPM | DIASTOLIC BLOOD PRESSURE: 82 MMHG | TEMPERATURE: 97.4 F | SYSTOLIC BLOOD PRESSURE: 152 MMHG | HEIGHT: 68 IN | OXYGEN SATURATION: 98 % | BODY MASS INDEX: 24.1 KG/M2 | HEART RATE: 74 BPM | WEIGHT: 159 LBS

## 2022-01-03 DIAGNOSIS — R35.0 FREQUENCY OF MICTURITION: ICD-10-CM

## 2022-01-03 PROCEDURE — 81002 URINALYSIS NONAUTO W/O SCOPE: CPT

## 2022-01-03 PROCEDURE — 99214 OFFICE O/P EST MOD 30 MIN: CPT | Mod: 25

## 2022-01-03 PROCEDURE — 36415 COLL VENOUS BLD VENIPUNCTURE: CPT

## 2022-01-03 NOTE — PHYSICAL EXAM
[No Acute Distress] : no acute distress [No JVD] : no jugular venous distention [No Lymphadenopathy] : no lymphadenopathy [No Varicosities] : no varicosities [No Edema] : there was no peripheral edema [Alert and Oriented x3] : oriented to person, place, and time [Normal] : affect was normal and insight and judgment were intact [de-identified] : right side + mass 4x4 ,m non tender, no erythema

## 2022-01-03 NOTE — HISTORY OF PRESENT ILLNESS
[FreeTextEntry1] : cc: right  neck mass, urinary frequency  [de-identified] : Pt c/o frequent urination, for the past 4 days, no fever, no chills, no back pain, deneis blood in the urine, no fever, no chills, Pt c/o right neck mass, 2 days , non tender,  no sore throat, no teeth pain.Pt deneis cough,no CP,no SOB.

## 2022-01-03 NOTE — REVIEW OF SYSTEMS
[Negative] : Musculoskeletal [Frequency] : frequency [Dysuria] : no dysuria [Hematuria] : no hematuria [FreeTextEntry4] : right side neck mass

## 2022-01-04 ENCOUNTER — APPOINTMENT (OUTPATIENT)
Dept: ULTRASOUND IMAGING | Facility: HOSPITAL | Age: 77
End: 2022-01-04
Payer: MEDICARE

## 2022-01-04 ENCOUNTER — EMERGENCY (EMERGENCY)
Facility: HOSPITAL | Age: 77
LOS: 1 days | Discharge: HOME | End: 2022-01-04
Attending: INTERNAL MEDICINE | Admitting: INTERNAL MEDICINE
Payer: MEDICARE

## 2022-01-04 ENCOUNTER — OUTPATIENT (OUTPATIENT)
Dept: OUTPATIENT SERVICES | Facility: HOSPITAL | Age: 77
LOS: 1 days | End: 2022-01-04
Payer: MEDICARE

## 2022-01-04 VITALS
HEART RATE: 80 BPM | SYSTOLIC BLOOD PRESSURE: 185 MMHG | TEMPERATURE: 98 F | OXYGEN SATURATION: 97 % | RESPIRATION RATE: 17 BRPM | WEIGHT: 151.9 LBS | HEIGHT: 67 IN | DIASTOLIC BLOOD PRESSURE: 85 MMHG

## 2022-01-04 VITALS
OXYGEN SATURATION: 98 % | RESPIRATION RATE: 16 BRPM | SYSTOLIC BLOOD PRESSURE: 168 MMHG | HEART RATE: 82 BPM | DIASTOLIC BLOOD PRESSURE: 84 MMHG

## 2022-01-04 DIAGNOSIS — R22.1 LOCALIZED SWELLING, MASS AND LUMP, NECK: ICD-10-CM

## 2022-01-04 LAB
ALBUMIN SERPL ELPH-MCNC: 4 G/DL — SIGNIFICANT CHANGE UP (ref 3.3–5)
ALP SERPL-CCNC: 87 U/L — SIGNIFICANT CHANGE UP (ref 40–120)
ALT FLD-CCNC: 15 U/L — SIGNIFICANT CHANGE UP (ref 10–45)
AMYLASE/CREAT SERPL: 77 U/L
ANION GAP SERPL CALC-SCNC: 7 MMOL/L — SIGNIFICANT CHANGE UP (ref 5–17)
APPEARANCE: CLEAR
AST SERPL-CCNC: 15 U/L — SIGNIFICANT CHANGE UP (ref 10–40)
BACTERIA: ABNORMAL
BASOPHILS # BLD AUTO: 0.04 K/UL
BASOPHILS # BLD AUTO: 0.04 K/UL — SIGNIFICANT CHANGE UP (ref 0–0.2)
BASOPHILS NFR BLD AUTO: 0.6 %
BASOPHILS NFR BLD AUTO: 0.7 % — SIGNIFICANT CHANGE UP (ref 0–2)
BILIRUB SERPL-MCNC: 0.4 MG/DL — SIGNIFICANT CHANGE UP (ref 0.2–1.2)
BILIRUBIN URINE: NEGATIVE
BLOOD URINE: NEGATIVE
BUN SERPL-MCNC: 17 MG/DL — SIGNIFICANT CHANGE UP (ref 7–23)
CALCIUM SERPL-MCNC: 9.1 MG/DL — SIGNIFICANT CHANGE UP (ref 8.4–10.5)
CHLORIDE SERPL-SCNC: 104 MMOL/L — SIGNIFICANT CHANGE UP (ref 96–108)
CO2 SERPL-SCNC: 29 MMOL/L — SIGNIFICANT CHANGE UP (ref 22–31)
COLOR: COLORLESS
CREAT SERPL-MCNC: 0.98 MG/DL — SIGNIFICANT CHANGE UP (ref 0.5–1.3)
CRP SERPL-MCNC: <3 MG/L
EOSINOPHIL # BLD AUTO: 0.08 K/UL — SIGNIFICANT CHANGE UP (ref 0–0.5)
EOSINOPHIL # BLD AUTO: 0.13 K/UL
EOSINOPHIL NFR BLD AUTO: 1.3 % — SIGNIFICANT CHANGE UP (ref 0–6)
EOSINOPHIL NFR BLD AUTO: 2 %
ERYTHROCYTE [SEDIMENTATION RATE] IN BLOOD BY WESTERGREN METHOD: 19 MM/HR
GLUCOSE QUALITATIVE U: NEGATIVE
GLUCOSE SERPL-MCNC: 106 MG/DL — HIGH (ref 70–99)
HCT VFR BLD CALC: 43.6 % — SIGNIFICANT CHANGE UP (ref 39–50)
HCT VFR BLD CALC: 48.1 %
HGB BLD-MCNC: 14.7 G/DL — SIGNIFICANT CHANGE UP (ref 13–17)
HGB BLD-MCNC: 15.4 G/DL
HYALINE CASTS: 0 /LPF
IMM GRANULOCYTES NFR BLD AUTO: 0.2 %
IMM GRANULOCYTES NFR BLD AUTO: 0.2 % — SIGNIFICANT CHANGE UP (ref 0–1.5)
KETONES URINE: NEGATIVE
LEUKOCYTE ESTERASE URINE: NEGATIVE
LYMPHOCYTES # BLD AUTO: 1.56 K/UL
LYMPHOCYTES # BLD AUTO: 1.6 K/UL — SIGNIFICANT CHANGE UP (ref 1–3.3)
LYMPHOCYTES # BLD AUTO: 26.6 % — SIGNIFICANT CHANGE UP (ref 13–44)
LYMPHOCYTES NFR BLD AUTO: 24.5 %
MAN DIFF?: NORMAL
MCHC RBC-ENTMCNC: 31.6 PG
MCHC RBC-ENTMCNC: 31.8 PG — SIGNIFICANT CHANGE UP (ref 27–34)
MCHC RBC-ENTMCNC: 32 GM/DL
MCHC RBC-ENTMCNC: 33.7 GM/DL — SIGNIFICANT CHANGE UP (ref 32–36)
MCV RBC AUTO: 94.4 FL — SIGNIFICANT CHANGE UP (ref 80–100)
MCV RBC AUTO: 98.8 FL
MICROSCOPIC-UA: NORMAL
MONOCYTES # BLD AUTO: 0.52 K/UL — SIGNIFICANT CHANGE UP (ref 0–0.9)
MONOCYTES # BLD AUTO: 0.6 K/UL
MONOCYTES NFR BLD AUTO: 8.7 % — SIGNIFICANT CHANGE UP (ref 2–14)
MONOCYTES NFR BLD AUTO: 9.4 %
NEUTROPHILS # BLD AUTO: 3.76 K/UL — SIGNIFICANT CHANGE UP (ref 1.8–7.4)
NEUTROPHILS # BLD AUTO: 4.02 K/UL
NEUTROPHILS NFR BLD AUTO: 62.5 % — SIGNIFICANT CHANGE UP (ref 43–77)
NEUTROPHILS NFR BLD AUTO: 63.3 %
NITRITE URINE: NEGATIVE
NRBC # BLD: 0 /100 WBCS — SIGNIFICANT CHANGE UP (ref 0–0)
PH URINE: 6
PLATELET # BLD AUTO: 214 K/UL — SIGNIFICANT CHANGE UP (ref 150–400)
PLATELET # BLD AUTO: 235 K/UL
POTASSIUM SERPL-MCNC: 4.5 MMOL/L — SIGNIFICANT CHANGE UP (ref 3.5–5.3)
POTASSIUM SERPL-SCNC: 4.5 MMOL/L — SIGNIFICANT CHANGE UP (ref 3.5–5.3)
PROT SERPL-MCNC: 7.4 G/DL — SIGNIFICANT CHANGE UP (ref 6–8.3)
PROTEIN URINE: NEGATIVE
RBC # BLD: 4.62 M/UL — SIGNIFICANT CHANGE UP (ref 4.2–5.8)
RBC # BLD: 4.87 M/UL
RBC # FLD: 12.6 % — SIGNIFICANT CHANGE UP (ref 10.3–14.5)
RBC # FLD: 12.8 %
RED BLOOD CELLS URINE: 0 /HPF
SARS-COV-2 RNA SPEC QL NAA+PROBE: SIGNIFICANT CHANGE UP
SODIUM SERPL-SCNC: 140 MMOL/L — SIGNIFICANT CHANGE UP (ref 135–145)
SPECIFIC GRAVITY URINE: 1
SQUAMOUS EPITHELIAL CELLS: 0 /HPF
UROBILINOGEN URINE: NORMAL
WBC # BLD: 6.01 K/UL — SIGNIFICANT CHANGE UP (ref 3.8–10.5)
WBC # FLD AUTO: 6.01 K/UL — SIGNIFICANT CHANGE UP (ref 3.8–10.5)
WBC # FLD AUTO: 6.36 K/UL
WHITE BLOOD CELLS URINE: 0 /HPF

## 2022-01-04 PROCEDURE — 76536 US EXAM OF HEAD AND NECK: CPT | Mod: 26

## 2022-01-04 PROCEDURE — 70491 CT SOFT TISSUE NECK W/DYE: CPT | Mod: 26,MA

## 2022-01-04 PROCEDURE — 99285 EMERGENCY DEPT VISIT HI MDM: CPT

## 2022-01-04 PROCEDURE — 87635 SARS-COV-2 COVID-19 AMP PRB: CPT

## 2022-01-04 PROCEDURE — 80053 COMPREHEN METABOLIC PANEL: CPT

## 2022-01-04 PROCEDURE — 99284 EMERGENCY DEPT VISIT MOD MDM: CPT | Mod: 25

## 2022-01-04 PROCEDURE — 70491 CT SOFT TISSUE NECK W/DYE: CPT | Mod: MA

## 2022-01-04 PROCEDURE — 36415 COLL VENOUS BLD VENIPUNCTURE: CPT

## 2022-01-04 PROCEDURE — 85025 COMPLETE CBC W/AUTO DIFF WBC: CPT

## 2022-01-04 PROCEDURE — 76536 US EXAM OF HEAD AND NECK: CPT

## 2022-01-04 RX ORDER — SODIUM CHLORIDE 9 MG/ML
1000 INJECTION INTRAMUSCULAR; INTRAVENOUS; SUBCUTANEOUS ONCE
Refills: 0 | Status: COMPLETED | OUTPATIENT
Start: 2022-01-04 | End: 2022-01-04

## 2022-01-04 RX ADMIN — SODIUM CHLORIDE 1000 MILLILITER(S): 9 INJECTION INTRAMUSCULAR; INTRAVENOUS; SUBCUTANEOUS at 14:23

## 2022-01-04 NOTE — ED PROVIDER NOTE - PHYSICAL EXAMINATION
General:     NAD, well-nourished, well-appearing  Head:     NC/AT, EOMI, oral mucosa moist  Neck:     trachea midline  R sided neck mass  upper neck measuring approx 2 cm , full rom c spine oeropharynx clear  Lungs:     CTA b/l, no w/r/r  CVS:     S1S2, RRR, no m/g/r  Abd:     +BS, s/nt/nd, no organomegaly  Ext:    2+ radial and pedal pulses, no c/c/e  Neuro: AAOx3, no sensory/motor deficits

## 2022-01-04 NOTE — ED ADULT NURSE NOTE - NSIMPLEMENTINTERV_GEN_ALL_ED
Implemented All Universal Safety Interventions:  [place] to call system. Call bell, personal items and telephone within reach. Instruct patient to call for assistance. Room bathroom lighting operational. Non-slip footwear when patient is off stretcher. Physically safe environment: no spills, clutter or unnecessary equipment. Stretcher in lowest position, wheels locked, appropriate side rails in place.

## 2022-01-04 NOTE — ED PROVIDER NOTE - OBJECTIVE STATEMENT
R neck mass 2 days noted prominent was noted mild 12/21 seen by dr carbone had sono today sent for ct R neck mass 2 days noted prominent was noted mild 12/21 seen by dr raf mckinney today sent for ct  onset gradual  locations R sided neck  duration days noted 2 days ago  characteristics R sided neck mass   context R neck mass sent by pmd for ct  aggravating factors none  relieving factors none   timming   constant getting worse   severity moderate

## 2022-01-04 NOTE — ED PROVIDER NOTE - NSFOLLOWUPINSTRUCTIONS_ED_ALL_ED_FT
Rest, drink plenty of fluids  Advance activity as tolerated  Continue all previously prescribed medications as directed  Follow up with your PMD 2-3 days- bring copies of your results  Return to the ER for worsening  see dr liz ballesteros tomorrow

## 2022-01-04 NOTE — ED PROVIDER NOTE - CARE PROVIDER_API CALL
Raheem Miller  OTOLARYNGOLOGY  [address] [place] [id number]  Phone: [phone]  Fax: [phone]  Follow Up Time:

## 2022-01-04 NOTE — CHART NOTE - NSCHARTNOTEFT_GEN_A_CORE
SW met with patient at bedside to assess for social work needs and to complete home assessment of safety.  Patient is a 76 year old male presenting to ED for swollen neck. SW introduced self and role of SW.  Patient denies any safety concerns, home care, or DME.  Patient reports he is independent, still working and driving.  Patient reports living in private home and has support.  Patient denies any recent falls. No immediate SW needs identified.  SW spoke with Dr Benton - patient is to follow up with ENT.  SW spoke with patient regarding follow up care.  Patient reports he is going on vacation on Saturday.  SW offered to call to schedule follow up for before vacation.  Patient in agreement.  Patient has seen Dr Miller in the past. SW called Dr Miller and scheduled follow up for tomorrow 1/5/22 at 11:30 AM.   Patient in agreement with time and date.   Patient confirms transportation home.    Patient advised that SW will call to follow up and assist as needed.  Patient appreciative of SW assistance.

## 2022-01-04 NOTE — ED ADULT TRIAGE NOTE - CHIEF COMPLAINT QUOTE
Pt stated he was told by PMD to come to ED for neck CT, had US done this AM for neck swelling Pt stated he was told by PMD to come to ED for neck CT, had US done this AM for neck swelling   ISAR negative

## 2022-01-04 NOTE — ED ADULT NURSE NOTE - CHIEF COMPLAINT QUOTE
Pt stated he was told by PMD to come to ED for neck CT, had US done this AM for neck swelling   ISAR negative

## 2022-01-04 NOTE — ED ADULT NURSE NOTE - OBJECTIVE STATEMENT
Pt arrives to ER stating that he noticed some swelling to the right side of his neck on 1/1. Pt saw PMD who sent him for US and bloodwork. Blood work was normal but US showed 5cm mass so he was sent to ER for cat scan. On arrival, pt presents with notable swelling to right side of neck but denies pain or discomfort. Pt denies any respiratory distress, neg chest pain, neg sob or fever/chills

## 2022-01-04 NOTE — ED PROVIDER NOTE - PATIENT PORTAL LINK FT
You can access the FollowMyHealth Patient Portal offered by [facility] by registering at the following website: [email or web address]. By joining [employer]’s FollowMyHealth portal, you will also be able to view your health information using other applications (apps) compatible with our system.

## 2022-01-05 LAB
MEV IGG FLD QL IA: >300 AU/ML
MEV IGG+IGM SER-IMP: POSITIVE
MUV AB SER-ACNC: POSITIVE
MUV IGG SER QL IA: 265 AU/ML
RUBV IGG FLD-ACNC: 5.3 INDEX
RUBV IGG SER-IMP: POSITIVE

## 2022-01-06 ENCOUNTER — NON-APPOINTMENT (OUTPATIENT)
Age: 77
End: 2022-01-06

## 2022-01-06 LAB
BACTERIA UR CULT: ABNORMAL
MUV IGM SER QL IA: <0.8 AU

## 2022-01-08 NOTE — CHART NOTE - NSCHARTNOTEFT_GEN_A_CORE
SW called Pt at home to discuss and assist with follow-up care. Pt did not answer. SW did not leave a voicemail as voicemail message did not provide any identifying information. Per prior SW note, appointment with ENT scheduled during ED visit with Dr. Miller for 1/5. Per Fabricio CID, Pt has an Otolaryngology appointment scheduled for 1/28.

## 2022-01-25 ENCOUNTER — NON-APPOINTMENT (OUTPATIENT)
Age: 77
End: 2022-01-25

## 2022-01-28 ENCOUNTER — APPOINTMENT (OUTPATIENT)
Dept: OTOLARYNGOLOGY | Facility: CLINIC | Age: 77
End: 2022-01-28
Payer: MEDICARE

## 2022-01-28 VITALS
HEART RATE: 77 BPM | SYSTOLIC BLOOD PRESSURE: 146 MMHG | DIASTOLIC BLOOD PRESSURE: 75 MMHG | BODY MASS INDEX: 24.59 KG/M2 | WEIGHT: 153 LBS | HEIGHT: 66 IN | TEMPERATURE: 98 F

## 2022-01-28 DIAGNOSIS — H91.93 UNSPECIFIED HEARING LOSS, BILATERAL: ICD-10-CM

## 2022-01-28 PROCEDURE — 99203 OFFICE O/P NEW LOW 30 MIN: CPT | Mod: 25

## 2022-01-28 PROCEDURE — 31575 DIAGNOSTIC LARYNGOSCOPY: CPT

## 2022-01-28 RX ORDER — NITROFURANTOIN (MONOHYDRATE/MACROCRYSTALS) 25; 75 MG/1; MG/1
100 CAPSULE ORAL TWICE DAILY
Qty: 14 | Refills: 0 | Status: COMPLETED | COMMUNITY
Start: 2022-01-06 | End: 2022-01-28

## 2022-01-28 RX ORDER — MIRABEGRON 25 MG/1
25 TABLET, FILM COATED, EXTENDED RELEASE ORAL
Qty: 30 | Refills: 5 | Status: COMPLETED | COMMUNITY
Start: 2021-12-20 | End: 2022-01-28

## 2022-01-28 RX ORDER — AMOXICILLIN AND CLAVULANATE POTASSIUM 875; 125 MG/1; MG/1
875-125 TABLET, COATED ORAL
Qty: 20 | Refills: 0 | Status: COMPLETED | COMMUNITY
Start: 2022-01-04 | End: 2022-01-28

## 2022-01-28 RX ORDER — PHENAZOPYRIDINE 200 MG/1
200 TABLET, FILM COATED ORAL 3 TIMES DAILY
Qty: 6 | Refills: 0 | Status: COMPLETED | COMMUNITY
Start: 2022-01-03 | End: 2022-01-28

## 2022-01-28 NOTE — PHYSICAL EXAM
[de-identified] : Right level 2-3 palpable enlarged lymph node, 3 x 3 cm on physical exam [Midline] : trachea located in midline position [Normal] : no rashes

## 2022-01-28 NOTE — HISTORY OF PRESENT ILLNESS
[de-identified] : 76 year old male referred by Dr. Raheem Miller for evaluation of right neck mass. Patient states growth began 1 or 2 years ago as a little bump. Over the last month has noticeably grown in size. Reports tenderness on palpation. No difficulties with eating or swallowing. Good appetite, weight is stable. Denies otalgia and tinnitus. No fever, sweats or night chills. \par \par CT Neck Soft Tissue 01/04/2022 IMPRESSION:\par Multiseptated cystic right level IIb node measuring up to 4.6 cm, concerning for necrotic metastasis.\par Correlate with tissue sampling

## 2022-01-28 NOTE — REASON FOR VISIT
[Initial Consultation] : an initial consultation for [Spouse] : spouse [FreeTextEntry2] : right neck mess, referred by Dr. Miller

## 2022-02-07 ENCOUNTER — RESULT REVIEW (OUTPATIENT)
Age: 77
End: 2022-02-07

## 2022-02-09 ENCOUNTER — RESULT REVIEW (OUTPATIENT)
Age: 77
End: 2022-02-09

## 2022-02-09 ENCOUNTER — OUTPATIENT (OUTPATIENT)
Dept: OUTPATIENT SERVICES | Facility: HOSPITAL | Age: 77
LOS: 1 days | End: 2022-02-09
Payer: MEDICARE

## 2022-02-09 ENCOUNTER — APPOINTMENT (OUTPATIENT)
Dept: ULTRASOUND IMAGING | Facility: IMAGING CENTER | Age: 77
End: 2022-02-09
Payer: MEDICARE

## 2022-02-09 DIAGNOSIS — R22.1 LOCALIZED SWELLING, MASS AND LUMP, NECK: ICD-10-CM

## 2022-02-09 PROCEDURE — 10006 FNA BX W/US GDN EA ADDL: CPT

## 2022-02-09 PROCEDURE — 88360 TUMOR IMMUNOHISTOCHEM/MANUAL: CPT

## 2022-02-09 PROCEDURE — 88305 TISSUE EXAM BY PATHOLOGIST: CPT

## 2022-02-09 PROCEDURE — 88341 IMHCHEM/IMCYTCHM EA ADD ANTB: CPT

## 2022-02-09 PROCEDURE — 88305 TISSUE EXAM BY PATHOLOGIST: CPT | Mod: 26

## 2022-02-09 PROCEDURE — 10005 FNA BX W/US GDN 1ST LES: CPT

## 2022-02-09 PROCEDURE — 88172 CYTP DX EVAL FNA 1ST EA SITE: CPT

## 2022-02-09 PROCEDURE — 88342 IMHCHEM/IMCYTCHM 1ST ANTB: CPT | Mod: 26,59

## 2022-02-09 PROCEDURE — 88360 TUMOR IMMUNOHISTOCHEM/MANUAL: CPT | Mod: 26

## 2022-02-09 PROCEDURE — 88365 INSITU HYBRIDIZATION (FISH): CPT | Mod: 26

## 2022-02-09 PROCEDURE — 88173 CYTOPATH EVAL FNA REPORT: CPT | Mod: 26

## 2022-02-09 PROCEDURE — 88365 INSITU HYBRIDIZATION (FISH): CPT

## 2022-02-09 PROCEDURE — 88173 CYTOPATH EVAL FNA REPORT: CPT

## 2022-02-15 ENCOUNTER — NON-APPOINTMENT (OUTPATIENT)
Age: 77
End: 2022-02-15

## 2022-02-15 ENCOUNTER — APPOINTMENT (OUTPATIENT)
Dept: FAMILY MEDICINE | Facility: CLINIC | Age: 77
End: 2022-02-15
Payer: MEDICARE

## 2022-02-15 VITALS
HEART RATE: 83 BPM | WEIGHT: 160 LBS | RESPIRATION RATE: 16 BRPM | DIASTOLIC BLOOD PRESSURE: 74 MMHG | BODY MASS INDEX: 25.71 KG/M2 | OXYGEN SATURATION: 98 % | SYSTOLIC BLOOD PRESSURE: 160 MMHG | TEMPERATURE: 97.3 F | HEIGHT: 66 IN

## 2022-02-15 DIAGNOSIS — J20.9 ACUTE BRONCHITIS, UNSPECIFIED: ICD-10-CM

## 2022-02-15 DIAGNOSIS — R22.1 LOCALIZED SWELLING, MASS AND LUMP, NECK: ICD-10-CM

## 2022-02-15 PROCEDURE — 99215 OFFICE O/P EST HI 40 MIN: CPT

## 2022-02-16 LAB
ALBUMIN SERPL ELPH-MCNC: 4.7 G/DL
ALP BLD-CCNC: 96 U/L
ALT SERPL-CCNC: 9 U/L
ANION GAP SERPL CALC-SCNC: 13 MMOL/L
AST SERPL-CCNC: 14 U/L
BASOPHILS # BLD AUTO: 0.05 K/UL
BASOPHILS NFR BLD AUTO: 0.7 %
BILIRUB SERPL-MCNC: 0.4 MG/DL
BUN SERPL-MCNC: 17 MG/DL
CALCIUM SERPL-MCNC: 9.5 MG/DL
CHLORIDE SERPL-SCNC: 103 MMOL/L
CO2 SERPL-SCNC: 26 MMOL/L
CREAT SERPL-MCNC: 0.98 MG/DL
DEPRECATED D DIMER PPP IA-ACNC: <150 NG/ML DDU
EOSINOPHIL # BLD AUTO: 0.26 K/UL
EOSINOPHIL NFR BLD AUTO: 3.6 %
GLUCOSE SERPL-MCNC: 100 MG/DL
HCT VFR BLD CALC: 45.6 %
HGB BLD-MCNC: 15.1 G/DL
IMM GRANULOCYTES NFR BLD AUTO: 0.4 %
LYMPHOCYTES # BLD AUTO: 1.78 K/UL
LYMPHOCYTES NFR BLD AUTO: 24.5 %
MAN DIFF?: NORMAL
MCHC RBC-ENTMCNC: 32.1 PG
MCHC RBC-ENTMCNC: 33.1 GM/DL
MCV RBC AUTO: 97 FL
MONOCYTES # BLD AUTO: 0.99 K/UL
MONOCYTES NFR BLD AUTO: 13.6 %
NEUTROPHILS # BLD AUTO: 4.15 K/UL
NEUTROPHILS NFR BLD AUTO: 57.2 %
PLATELET # BLD AUTO: 246 K/UL
POTASSIUM SERPL-SCNC: 4.5 MMOL/L
PROT SERPL-MCNC: 7.5 G/DL
RBC # BLD: 4.7 M/UL
RBC # FLD: 12.9 %
SODIUM SERPL-SCNC: 142 MMOL/L
WBC # FLD AUTO: 7.26 K/UL

## 2022-02-17 ENCOUNTER — NON-APPOINTMENT (OUTPATIENT)
Age: 77
End: 2022-02-17

## 2022-02-21 ENCOUNTER — APPOINTMENT (OUTPATIENT)
Dept: NUCLEAR MEDICINE | Facility: CLINIC | Age: 77
End: 2022-02-21

## 2022-02-22 ENCOUNTER — APPOINTMENT (OUTPATIENT)
Dept: PULMONOLOGY | Facility: CLINIC | Age: 77
End: 2022-02-22
Payer: MEDICARE

## 2022-02-22 ENCOUNTER — RX RENEWAL (OUTPATIENT)
Age: 77
End: 2022-02-22

## 2022-02-22 VITALS
BODY MASS INDEX: 24.59 KG/M2 | DIASTOLIC BLOOD PRESSURE: 70 MMHG | OXYGEN SATURATION: 97 % | HEART RATE: 89 BPM | HEIGHT: 66 IN | WEIGHT: 153 LBS | RESPIRATION RATE: 16 BRPM | SYSTOLIC BLOOD PRESSURE: 138 MMHG | TEMPERATURE: 97.9 F

## 2022-02-22 DIAGNOSIS — U07.1 COVID-19: ICD-10-CM

## 2022-02-22 PROCEDURE — 99213 OFFICE O/P EST LOW 20 MIN: CPT

## 2022-02-22 NOTE — END OF VISIT
· Continue protonix 40 mg daily per chart review [Time Spent: ___ minutes] : I have spent [unfilled] minutes of time on the encounter.

## 2022-02-22 NOTE — ASSESSMENT
[FreeTextEntry1] : Patient likely has post Covid related cough.  Singulair trial ordered.  We discussed Covid immunization.  Patient is refusing Covid immunization.  He is aware that Covid infection or reinfection could be fatal.

## 2022-03-14 NOTE — ED ADULT NURSE NOTE - SUICIDE SCREENING QUESTION 1
Medical Necessity Information: It is in your best interest to select a reason for this procedure from the list below. All of these items fulfill various CMS LCD requirements except the new and changing color options. Number Of Freeze-Thaw Cycles: 2 freeze-thaw cycles Consent: The patient's consent was obtained including but not limited to risks of crusting, scabbing, blistering, scarring, darker or lighter pigmentary change, recurrence, incomplete removal and infection. Detail Level: Simple Total Number Of Lesions Treated: 2 Include Z78.9 (Other Specified Conditions Influencing Health Status) As An Associated Diagnosis?: No Render Post Care In The Note?: yes Post-Care Instructions: I reviewed with the patient in detail post-care instructions. Patient is to wear sunprotection, and avoid picking at any of the treated lesions. Pt may apply Vaseline to crusted or scabbing areas. Spray Paint Text: The liquid nitrogen was applied to the skin utilizing a spray paint frosting technique. Medical Necessity Clause: This procedure was medically necessary because the lesions that were treated were: Duration Of Freeze Thaw-Cycle (Seconds): 10 Number Of Freeze-Thaw Cycles: 3 freeze-thaw cycles Duration Of Freeze Thaw-Cycle (Seconds): 10-15 No

## 2022-03-15 ENCOUNTER — APPOINTMENT (OUTPATIENT)
Dept: NUCLEAR MEDICINE | Facility: CLINIC | Age: 77
End: 2022-03-15
Payer: MEDICARE

## 2022-03-15 ENCOUNTER — OUTPATIENT (OUTPATIENT)
Dept: OUTPATIENT SERVICES | Facility: HOSPITAL | Age: 77
LOS: 1 days | End: 2022-03-15
Payer: MEDICARE

## 2022-03-15 DIAGNOSIS — C76.0 MALIGNANT NEOPLASM OF HEAD, FACE AND NECK: ICD-10-CM

## 2022-03-15 PROCEDURE — A9552: CPT

## 2022-03-15 PROCEDURE — 78815 PET IMAGE W/CT SKULL-THIGH: CPT

## 2022-03-15 PROCEDURE — 78815 PET IMAGE W/CT SKULL-THIGH: CPT | Mod: 26,PI,MH

## 2022-03-21 ENCOUNTER — RX RENEWAL (OUTPATIENT)
Age: 77
End: 2022-03-21

## 2022-03-25 ENCOUNTER — APPOINTMENT (OUTPATIENT)
Dept: OTOLARYNGOLOGY | Facility: CLINIC | Age: 77
End: 2022-03-25
Payer: MEDICARE

## 2022-03-25 VITALS
HEIGHT: 66 IN | BODY MASS INDEX: 24.27 KG/M2 | DIASTOLIC BLOOD PRESSURE: 75 MMHG | SYSTOLIC BLOOD PRESSURE: 137 MMHG | WEIGHT: 151 LBS

## 2022-03-25 PROCEDURE — 31575 DIAGNOSTIC LARYNGOSCOPY: CPT

## 2022-03-25 PROCEDURE — 99215 OFFICE O/P EST HI 40 MIN: CPT | Mod: 25

## 2022-03-25 RX ORDER — HYDROCODONE BITARTRATE AND HOMATROPINE METHYLBROMIDE 5; 1.5 MG/5ML; MG/5ML
5-1.5 SOLUTION ORAL
Qty: 250 | Refills: 0 | Status: COMPLETED | COMMUNITY
Start: 2020-01-21 | End: 2022-03-25

## 2022-03-25 RX ORDER — PREDNISONE 20 MG/1
20 TABLET ORAL DAILY
Qty: 5 | Refills: 0 | Status: COMPLETED | COMMUNITY
Start: 2022-02-15 | End: 2022-03-25

## 2022-03-25 RX ORDER — BENZONATATE 200 MG/1
200 CAPSULE ORAL 3 TIMES DAILY
Qty: 60 | Refills: 1 | Status: COMPLETED | COMMUNITY
Start: 2022-02-15 | End: 2022-03-25

## 2022-03-25 RX ORDER — MONTELUKAST 10 MG/1
10 TABLET, FILM COATED ORAL DAILY
Qty: 30 | Refills: 3 | Status: COMPLETED | COMMUNITY
Start: 2022-02-22 | End: 2022-03-25

## 2022-03-25 NOTE — HISTORY OF PRESENT ILLNESS
[de-identified] : 76 year old male presents for follow up for right neck mess. States has pressure when moving neck to his left.  Patient denies pain, dysphagia, odynophagia, dyspnea, dysphonia, night sweats, chills, fevers, or otalgia.\par \par PET 3/15/2022\par IMPRESSION: Abnormal FDG-PET/CT scan.\par \par 1. Mildly asymmetric FDG activity in the RIGHT base of tongue, may represent primary tumor. Asymmetric FDG activity in LEFT tonsillar region; suggest correlation with direct visualization to exclude a synchronous tumor.\par \par 2. Large necrotic RIGHT level II cervical lymph node with peripheral FDG avidity corresponding to biopsy-proven squamous cell cancer. Additional smaller adjacent lymph nodes and subcentimeter right level 4 lymph node with mild FDG activity concerning for additional metastatic disease.\par \par 3. Mildly FDG avid LEFT level IIA node is indeterminate. This may be further evaluated with ultrasound.\par \par 4. Nonspecific right colonic hypermetabolism, physiologic versus inflammatory. Correlate clinically.\par \par

## 2022-03-25 NOTE — PHYSICAL EXAM
[de-identified] : Right-sided level 2 3 neck mass as noted, mobility as per last visit [Normal] : palatine tonsils are normal [de-identified] : Fullness right tongue base [de-identified] : Possible small primary right tongue base

## 2022-03-28 ENCOUNTER — NON-APPOINTMENT (OUTPATIENT)
Age: 77
End: 2022-03-28

## 2022-03-28 NOTE — HEALTH RISK ASSESSMENT
[Never] : Never [Yes] : Yes [Monthly or less (1 pt)] : Monthly or less (1 point) [1 or 2 (0 pts)] : 1 or 2 (0 points) [Never (0 pts)] : Never (0 points) [No falls in past year] : Patient reported no falls in the past year [0] : 2) Feeling down, depressed, or hopeless: Not at all (0) [Audit-CScore] : 1 ( rare) [de-identified] : walking  [de-identified] : regular  [[id number]] : 0

## 2022-03-28 NOTE — PHYSICAL EXAM
[No Acute Distress] : no acute distress [No Respiratory Distress] : no respiratory distress  [No Accessory Muscle Use] : no accessory muscle use [No Varicosities] : no varicosities [No Edema] : there was no peripheral edema [Normal] : soft, non-tender, non-distended, no masses palpated, no HSM and normal bowel sounds [de-identified] : right neck pass  [de-identified] : right LL + rhonchi

## 2022-03-28 NOTE — HISTORY OF PRESENT ILLNESS
[FreeTextEntry8] : cc: chronic cough \par Pt c/o productive cough 1 week , pt has had COVID 01/15/2022, s/p MAB, s/p BX neck mass 02/09/22, path pending, Pt under ENT care dr. Miller and  , on CT neck + suspicious  at the base of his tongue as per  will need BX too. Patient deenis fever, no CP,no SOB,no abd pain.

## 2022-03-28 NOTE — REVIEW OF SYSTEMS
[Cough] : cough [Negative] : Neurological [Shortness Of Breath] : no shortness of breath [Wheezing] : no wheezing [Dyspnea on Exertion] : not dyspnea on exertion [FreeTextEntry4] : right neck mass , s/p Bx 02/09/22

## 2022-04-11 ENCOUNTER — APPOINTMENT (OUTPATIENT)
Dept: FAMILY MEDICINE | Facility: CLINIC | Age: 77
End: 2022-04-11
Payer: MEDICARE

## 2022-04-11 VITALS
BODY MASS INDEX: 23.95 KG/M2 | SYSTOLIC BLOOD PRESSURE: 140 MMHG | DIASTOLIC BLOOD PRESSURE: 78 MMHG | TEMPERATURE: 97.2 F | HEART RATE: 73 BPM | OXYGEN SATURATION: 99 % | RESPIRATION RATE: 16 BRPM | WEIGHT: 149 LBS | HEIGHT: 66 IN

## 2022-04-11 DIAGNOSIS — Z01.818 ENCOUNTER FOR OTHER PREPROCEDURAL EXAMINATION: ICD-10-CM

## 2022-04-11 DIAGNOSIS — Z78.9 OTHER SPECIFIED HEALTH STATUS: ICD-10-CM

## 2022-04-11 PROCEDURE — 36415 COLL VENOUS BLD VENIPUNCTURE: CPT

## 2022-04-11 PROCEDURE — 99215 OFFICE O/P EST HI 40 MIN: CPT | Mod: 25

## 2022-04-11 RX ORDER — MIRABEGRON 50 MG/1
50 TABLET, FILM COATED, EXTENDED RELEASE ORAL
Qty: 90 | Refills: 1 | Status: ACTIVE | COMMUNITY
Start: 2022-04-11

## 2022-04-12 LAB
ALBUMIN SERPL ELPH-MCNC: 4.6 G/DL
ALP BLD-CCNC: 80 U/L
ALT SERPL-CCNC: 18 U/L
ANION GAP SERPL CALC-SCNC: 14 MMOL/L
APTT BLD: 30 SEC
AST SERPL-CCNC: 21 U/L
BASOPHILS # BLD AUTO: 0.03 K/UL
BASOPHILS NFR BLD AUTO: 0.6 %
BILIRUB SERPL-MCNC: 0.4 MG/DL
BUN SERPL-MCNC: 13 MG/DL
CALCIUM SERPL-MCNC: 9.5 MG/DL
CHLORIDE SERPL-SCNC: 103 MMOL/L
CO2 SERPL-SCNC: 22 MMOL/L
CREAT SERPL-MCNC: 0.87 MG/DL
EGFR: 90 ML/MIN/1.73M2
EOSINOPHIL # BLD AUTO: 0.23 K/UL
EOSINOPHIL NFR BLD AUTO: 4.9 %
GLUCOSE SERPL-MCNC: 95 MG/DL
HCT VFR BLD CALC: 44.9 %
HGB BLD-MCNC: 14.9 G/DL
IMM GRANULOCYTES NFR BLD AUTO: 0.2 %
INR PPP: 1.15 RATIO
LYMPHOCYTES # BLD AUTO: 1.57 K/UL
LYMPHOCYTES NFR BLD AUTO: 33.5 %
MAN DIFF?: NORMAL
MCHC RBC-ENTMCNC: 31.2 PG
MCHC RBC-ENTMCNC: 33.2 GM/DL
MCV RBC AUTO: 94.1 FL
MONOCYTES # BLD AUTO: 0.53 K/UL
MONOCYTES NFR BLD AUTO: 11.3 %
NEUTROPHILS # BLD AUTO: 2.31 K/UL
NEUTROPHILS NFR BLD AUTO: 49.5 %
PLATELET # BLD AUTO: 224 K/UL
POTASSIUM SERPL-SCNC: 4.6 MMOL/L
PROT SERPL-MCNC: 7.1 G/DL
PT BLD: 13.4 SEC
RBC # BLD: 4.77 M/UL
RBC # FLD: 13.1 %
SODIUM SERPL-SCNC: 139 MMOL/L
WBC # FLD AUTO: 4.68 K/UL

## 2022-04-12 NOTE — ASSESSMENT
[Patient Requires Further Testing] : Patient requires further testing [Cardiology consultation] : Cardiology consultation [Other: _____] : [unfilled] [Continue medications as is] : Continue current medications [As per surgery] : as per surgery [FreeTextEntry5] : NA [FreeTextEntry6] : NA

## 2022-04-12 NOTE — PHYSICAL EXAM
[No Acute Distress] : no acute distress [No Respiratory Distress] : no respiratory distress  [No Accessory Muscle Use] : no accessory muscle use [Clear to Auscultation] : lungs were clear to auscultation bilaterally [No Varicosities] : no varicosities [No Edema] : there was no peripheral edema [Alert and Oriented x3] : oriented to person, place, and time [Normal] : affect was normal and insight and judgment were intact [de-identified] : right neck pass

## 2022-04-12 NOTE — HISTORY OF PRESENT ILLNESS
[No Pertinent Pulmonary History] : no history of asthma, COPD, sleep apnea, or smoking [No Adverse Anesthesia Reaction] : no adverse anesthesia reaction in self or family member [(Patient denies any chest pain, claudication, dyspnea on exertion, orthopnea, palpitations or syncope)] : Patient denies any chest pain, claudication, dyspnea on exertion, orthopnea, palpitations or syncope [Excellent (>10 METs)] : Excellent (>10 METs) [Coronary Artery Disease] : no coronary artery disease [Chronic Anticoagulation] : no chronic anticoagulation [Chronic Kidney Disease] : no chronic kidney disease [Diabetes] : no diabetes [FreeTextEntry1] : Transoral robotic resection of the right base of the tongue, left tonsil resection and selective neck dissection right.  [FreeTextEntry2] : 04/20/2022 [FreeTextEntry3] : Dr. Jd Yu [FreeTextEntry4] : Patient denies CP,SOB,abd pain, no cough,no fever, no chills, He was not 100 % well last week - viral symptoms for 24 h - the all resolved, feeling well since. Right neck mass. CT chest 12/27/2021  show coronary calcifications , patient was not evaluated by the Cardiologist yet.  [FreeTextEntry5] : + coronary calcifications on CT chest 12/2021

## 2022-04-14 ENCOUNTER — NON-APPOINTMENT (OUTPATIENT)
Age: 77
End: 2022-04-14

## 2022-04-14 ENCOUNTER — APPOINTMENT (OUTPATIENT)
Dept: CARDIOLOGY | Facility: CLINIC | Age: 77
End: 2022-04-14
Payer: MEDICARE

## 2022-04-14 VITALS
RESPIRATION RATE: 17 BRPM | BODY MASS INDEX: 23.95 KG/M2 | SYSTOLIC BLOOD PRESSURE: 145 MMHG | HEART RATE: 71 BPM | TEMPERATURE: 98.1 F | HEIGHT: 66 IN | OXYGEN SATURATION: 98 % | DIASTOLIC BLOOD PRESSURE: 70 MMHG | WEIGHT: 149 LBS

## 2022-04-14 DIAGNOSIS — R06.00 DYSPNEA, UNSPECIFIED: ICD-10-CM

## 2022-04-14 DIAGNOSIS — R07.89 OTHER CHEST PAIN: ICD-10-CM

## 2022-04-14 DIAGNOSIS — Z01.810 ENCOUNTER FOR PREPROCEDURAL CARDIOVASCULAR EXAMINATION: ICD-10-CM

## 2022-04-14 DIAGNOSIS — R07.9 CHEST PAIN, UNSPECIFIED: ICD-10-CM

## 2022-04-14 PROCEDURE — 99204 OFFICE O/P NEW MOD 45 MIN: CPT

## 2022-04-14 PROCEDURE — 93000 ELECTROCARDIOGRAM COMPLETE: CPT | Mod: NC

## 2022-04-14 PROCEDURE — 93306 TTE W/DOPPLER COMPLETE: CPT

## 2022-04-16 PROBLEM — R06.00 DYSPNEA ON EXERTION: Status: ACTIVE | Noted: 2022-04-16

## 2022-04-16 PROBLEM — R07.89 CHEST DISCOMFORT: Status: ACTIVE | Noted: 2022-04-14

## 2022-04-16 PROBLEM — R07.9 CHEST PAIN, EXERTIONAL: Status: ACTIVE | Noted: 2022-04-16

## 2022-04-16 PROBLEM — Z01.810 PRE-OPERATIVE CARDIOVASCULAR EXAMINATION: Status: ACTIVE | Noted: 2022-04-16

## 2022-04-16 NOTE — CARDIOLOGY SUMMARY
[de-identified] : ECG (4/14/22): normal sinus rhythm  [de-identified] : TTE (4/14/22): LVEF 60-65%, mild asymmetric septal LVH. Normal RV size and function. Mobile interatrial septum. Mildly dilated aortic root. No pericardial effusion.

## 2022-04-16 NOTE — HISTORY OF PRESENT ILLNESS
[FreeTextEntry1] : Jay Jay Mahajan is a 76 year old man with recent diagnosis of right neck mass/squamous cell cancer presents for pre-operative cardiac risk stratification prior to right neck dissection, tonsil resection and resection of base of tongue. \par \par The patient was found to  have incidental findings of coronary calcification on CT chest. His son, Nemesio is helping to provide supplemental information. The patient reports that his surgery is scheduled for next Wednesday with Dr. Gigi Miles at [facility]. The patient reports that he exercises regularly, walks everyday and mostly feels well. At times he can have a chest discomfort when he over-exerts himself which he has had for about 5 years now. Reports chronic shortness of breath since he was a child which he attributes to when he was sick as a child and had bronchoscopy. Denies prior history of myocardial infarction or coronary stenting. Denies any recent hospitalizations.

## 2022-04-16 NOTE — PHYSICAL EXAM
[Normal Conjunctiva] : normal conjunctiva [Normal Gait] : normal gait [No Edema] : no edema [Normal] : alert and oriented, normal memory [de-identified] : elderly man, no acute distress  [de-identified] : right neck mass, no carotid bruits b/l [de-identified] : JVP ~ 7 cm H20, RRR, s1, s2, no murmurs/rubs [de-identified] : unlabored respirations, clear lung fields  [de-identified] : non-distended

## 2022-04-16 NOTE — REVIEW OF SYSTEMS
[SOB] : shortness of breath [Chest Discomfort] : chest discomfort [Fever] : no fever [Chills] : no chills [Blurry Vision] : no blurred vision [Lower Ext Edema] : no extremity edema [Palpitations] : no palpitations [Cough] : no cough [Abdominal Pain] : no abdominal pain [Rash] : no rash [Dizziness] : no dizziness [Confusion] : no confusion was observed

## 2022-04-16 NOTE — ASSESSMENT
[FreeTextEntry1] : Assessment:\par Jay Jay Mahajan is a 76 year old man with recent diagnosis of right neck mass/squamous cell cancer presents for pre-operative cardiac risk stratification prior to right neck dissection, tonsil resection and resection of base of tongue. \par \par The patient was found to  have incidental findings of coronary calcification on CT chest. His son, Nemesio is helping to provide supplemental information. The patient reports that he exercises regularly, walks everyday and mostly feels well. At times he can experience exertional chest discomfort when he over-exerts himself, also has mild chronic shortness of breath on exertion since his childhood which he attributes to when he was sick as a child and had a bronchoscopy done. It does not appear that these symptoms have recently worsened, appears stable. ECG consistent with normal sinus rhythm, no acute ischemic ST abnormalities. BP borderline elevated. Recommend the following:\par \par Recommendations:\par [] Pre-operative cardiac risk stratification: Given symptoms of chest discomfort and shortness of breath on exertion, although stable, patient had echocardiogram done today to evaluate for structural heart disease. Echocardiogram images reviewed and consistent with normal LVEF 60-65%, mild septal LVH, normal right ventricle size and function and mildly dilated aortic root. Given symptoms and coronary calcifications on CT chest, pharmacologic nuclear stress test was recommended to guide risk stratification but patient and family decline at this time, discussed with son Nemesio and patient in detail cardiac risks for example myocardial infarction during surgery. Given urgent nature of surgery the patient is at moderate cardiovascular risk for right neck dissection, tonsil resection and resection of base of tongue. The patient may proceed with surgery at this level of risk and no further cardiac testing will be needed at this time. Recommend telemetry monitoring throughout this period. Fax # [phone]. Nemesio Whitman [phone].\par [] Coronary calcifications: Patient with coronary calcifications on CT chest, in addition 10 yr ASCVD risk score of 28.3 % warrants lipid lowering therapy, can further address after surgery\par [] Return to office: 4-6 weeks after surgery for cardiac re-evaluation \par \par

## 2022-04-19 ENCOUNTER — TRANSCRIPTION ENCOUNTER (OUTPATIENT)
Age: 77
End: 2022-04-19

## 2022-04-19 VITALS
HEART RATE: 87 BPM | SYSTOLIC BLOOD PRESSURE: 149 MMHG | WEIGHT: 145.51 LBS | DIASTOLIC BLOOD PRESSURE: 79 MMHG | HEIGHT: 66 IN | RESPIRATION RATE: 16 BRPM | OXYGEN SATURATION: 99 % | TEMPERATURE: 98 F

## 2022-04-19 NOTE — PATIENT PROFILE ADULT - FUNCTIONAL ASSESSMENT - BASIC MOBILITY 5.
"Chief Complaint   Patient presents with   • Failure to Thrive     Mother reports pt has not been able to eat or drink anything, has not improved since previous DC from hospital on 12/13/2020     /81   Pulse 72   Temp 37.3 °C (99.1 °F) (Temporal)   Resp 18   Ht 1.702 m (5' 7\")   Wt 71.9 kg (158 lb 8.2 oz)   SpO2 97%   BMI 24.83 kg/m²     Covid Screen Negative      "
4 = No assist / stand by assistance

## 2022-04-19 NOTE — PATIENT PROFILE ADULT - FALL HARM RISK - HARM RISK INTERVENTIONS

## 2022-04-20 ENCOUNTER — TRANSCRIPTION ENCOUNTER (OUTPATIENT)
Age: 77
End: 2022-04-20

## 2022-04-20 ENCOUNTER — INPATIENT (INPATIENT)
Facility: HOSPITAL | Age: 77
LOS: 0 days | Discharge: HOME | DRG: 827 | End: 2022-04-21
Attending: OTOLARYNGOLOGY | Admitting: OTOLARYNGOLOGY
Payer: MEDICARE

## 2022-04-20 ENCOUNTER — APPOINTMENT (OUTPATIENT)
Dept: OTOLARYNGOLOGY | Facility: HOSPITAL | Age: 77
End: 2022-04-20

## 2022-04-20 ENCOUNTER — RESULT REVIEW (OUTPATIENT)
Age: 77
End: 2022-04-20

## 2022-04-20 DIAGNOSIS — Z98.890 OTHER SPECIFIED POSTPROCEDURAL STATES: Chronic | ICD-10-CM

## 2022-04-20 DIAGNOSIS — Z90.49 ACQUIRED ABSENCE OF OTHER SPECIFIED PARTS OF DIGESTIVE TRACT: Chronic | ICD-10-CM

## 2022-04-20 DIAGNOSIS — C76.0 MALIGNANT NEOPLASM OF HEAD, FACE AND NECK: ICD-10-CM

## 2022-04-20 PROCEDURE — 88305 TISSUE EXAM BY PATHOLOGIST: CPT | Mod: 26

## 2022-04-20 PROCEDURE — 41120 PARTIAL REMOVAL OF TONGUE: CPT

## 2022-04-20 PROCEDURE — 88304 TISSUE EXAM BY PATHOLOGIST: CPT | Mod: 26

## 2022-04-20 PROCEDURE — 42842 EXTENSIVE SURGERY OF THROAT: CPT

## 2022-04-20 PROCEDURE — 88309 TISSUE EXAM BY PATHOLOGIST: CPT | Mod: 26

## 2022-04-20 PROCEDURE — 88342 IMHCHEM/IMCYTCHM 1ST ANTB: CPT | Mod: 26

## 2022-04-20 PROCEDURE — 88331 PATH CONSLTJ SURG 1 BLK 1SPC: CPT | Mod: 26

## 2022-04-20 PROCEDURE — S2900 ROBOTIC SURGICAL SYSTEM: CPT | Mod: NC

## 2022-04-20 PROCEDURE — 38724 REMOVAL OF LYMPH NODES NECK: CPT

## 2022-04-20 DEVICE — SURGICEL 4 X 8": Type: IMPLANTABLE DEVICE | Site: RIGHT | Status: FUNCTIONAL

## 2022-04-20 DEVICE — SURGICEL POWDER 3 GRAMS: Type: IMPLANTABLE DEVICE | Site: RIGHT | Status: FUNCTIONAL

## 2022-04-20 DEVICE — LIGATING CLIPS WECK HEMOLOK POLYMER MEDIUM-LARGE (GREEN) 6: Type: IMPLANTABLE DEVICE | Site: RIGHT | Status: FUNCTIONAL

## 2022-04-20 RX ORDER — OXYCODONE HYDROCHLORIDE 5 MG/1
10 TABLET ORAL EVERY 6 HOURS
Refills: 0 | Status: DISCONTINUED | OUTPATIENT
Start: 2022-04-20 | End: 2022-04-21

## 2022-04-20 RX ORDER — POLYETHYLENE GLYCOL 3350 17 G/17G
17 POWDER, FOR SOLUTION ORAL DAILY
Refills: 0 | Status: DISCONTINUED | OUTPATIENT
Start: 2022-04-20 | End: 2022-04-21

## 2022-04-20 RX ORDER — OXYCODONE HYDROCHLORIDE 5 MG/1
5 TABLET ORAL EVERY 6 HOURS
Refills: 0 | Status: DISCONTINUED | OUTPATIENT
Start: 2022-04-20 | End: 2022-04-21

## 2022-04-20 RX ORDER — ONDANSETRON 8 MG/1
4 TABLET, FILM COATED ORAL ONCE
Refills: 0 | Status: DISCONTINUED | OUTPATIENT
Start: 2022-04-20 | End: 2022-04-21

## 2022-04-20 RX ORDER — MIRABEGRON 50 MG/1
1 TABLET, EXTENDED RELEASE ORAL
Qty: 0 | Refills: 0 | DISCHARGE

## 2022-04-20 RX ORDER — OXYCODONE HYDROCHLORIDE 5 MG/1
10 TABLET ORAL EVERY 6 HOURS
Refills: 0 | Status: DISCONTINUED | OUTPATIENT
Start: 2022-04-20 | End: 2022-04-20

## 2022-04-20 RX ORDER — ACETAMINOPHEN 500 MG
975 TABLET ORAL EVERY 6 HOURS
Refills: 0 | Status: DISCONTINUED | OUTPATIENT
Start: 2022-04-20 | End: 2022-04-20

## 2022-04-20 RX ORDER — SODIUM CHLORIDE 9 MG/ML
1000 INJECTION, SOLUTION INTRAVENOUS
Refills: 0 | Status: DISCONTINUED | OUTPATIENT
Start: 2022-04-20 | End: 2022-04-21

## 2022-04-20 RX ORDER — OXYCODONE HYDROCHLORIDE 5 MG/1
5 TABLET ORAL EVERY 6 HOURS
Refills: 0 | Status: DISCONTINUED | OUTPATIENT
Start: 2022-04-20 | End: 2022-04-20

## 2022-04-20 RX ORDER — LANOLIN ALCOHOL/MO/W.PET/CERES
3 CREAM (GRAM) TOPICAL AT BEDTIME
Refills: 0 | Status: DISCONTINUED | OUTPATIENT
Start: 2022-04-20 | End: 2022-04-21

## 2022-04-20 RX ORDER — ACETAMINOPHEN 500 MG
975 TABLET ORAL EVERY 6 HOURS
Refills: 0 | Status: DISCONTINUED | OUTPATIENT
Start: 2022-04-20 | End: 2022-04-21

## 2022-04-20 RX ORDER — MORPHINE SULFATE 50 MG/1
2 CAPSULE, EXTENDED RELEASE ORAL ONCE
Refills: 0 | Status: DISCONTINUED | OUTPATIENT
Start: 2022-04-20 | End: 2022-04-21

## 2022-04-20 RX ORDER — SENNA PLUS 8.6 MG/1
1 TABLET ORAL AT BEDTIME
Refills: 0 | Status: DISCONTINUED | OUTPATIENT
Start: 2022-04-20 | End: 2022-04-21

## 2022-04-20 RX ADMIN — Medication 975 MILLIGRAM(S): at 18:01

## 2022-04-20 RX ADMIN — Medication 975 MILLIGRAM(S): at 17:05

## 2022-04-20 NOTE — H&P ADULT - NSHPPHYSICALEXAM_GEN_ALL_CORE
PHYSICAL EXAM:    CONSTITUTIONAL: Well nourished, well developed, NON-DYSMORPHIC, and in no acute distress.    HEAD: normocephalic, atraumatic.  NOSE: Normal external nose. Anterior nasal cavity patent with no obstruction. Inferior turbinates normally sized.  ORAL CAVITY/OROPHARYNX: post op changes in b/l tonsillar fossa  NECK: soft, flat, right neck incision with steristrips  RESPIRATORY: Respirations unlabored, no increased work of breathing with use of accessory muscles and retractions. No stridor.  CARDIAC: Warm extremities, no cyanosis.

## 2022-04-20 NOTE — DISCHARGE NOTE PROVIDER - HOSPITAL COURSE
The patient is s/p b/l TORS tonsillectomy, BOT tumor resection, right SND levels II-IV. Postoperatively the patient recovered in the PACU, was hemodynamically stable, and was sent to the floor. The patient's pain was controlled by IV pain medications and they were transitioned to PO medications. Pt is tolerating current diet regiment. The patient was hemodynamically stable and placed on home medications. Please call  office to confirm follow up appointment.

## 2022-04-20 NOTE — H&P ADULT - ASSESSMENT
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    - Admit to ENT   - Full liquid diet  - Pain/nausea control  - Monitor NICK drain output  - monitor for bleeding  - Bowel regimen  - SCDs  - Dispo: dc tomorrow morning

## 2022-04-20 NOTE — DISCHARGE NOTE PROVIDER - NSDCMRMEDTOKEN_GEN_ALL_CORE_FT
Myrbetriq 50 mg oral tablet, extended release: 1 tab(s) orally once a day   acetaminophen 160 mg/5 mL oral suspension: 30.47 milliliter(s) orally every 6 hours  Children&#x27;s Tylenol 160 mg/5 mL oral suspension: 30 milliliter(s) orally every 6 hours MDD:120 mL  Myrbetriq 50 mg oral tablet, extended release: 1 tab(s) orally once a day

## 2022-04-20 NOTE — BRIEF OPERATIVE NOTE - NSICDXBRIEFPROCEDURE_GEN_ALL_CORE_FT
PROCEDURES:  Transoral robotic surgery (TORS) with resection of base of tongue 20-Apr-2022 11:46:29  Aman Goodman

## 2022-04-20 NOTE — CHART NOTE - NSCHARTNOTEFT_GEN_A_CORE
Patient presents for transoral robotic surgery, bilateral tonsillectomy, right base of tongue.  Right selective neck dissection  Discussed risks and benefits with patient and son at length this AM. Consent signed, placed in chart.
ED

## 2022-04-20 NOTE — DISCHARGE NOTE PROVIDER - CARE PROVIDER_API CALL
Gigi Miles; DDS)  [facility] Surgery; OralMaxillofacial Surgery; Otolaryngology  [phone] [place] [address]  Phone: [phone]  Fax: [phone]  Follow Up Time:

## 2022-04-20 NOTE — PACU DISCHARGE NOTE - COMMENTS
s/p robotic assisted bilateral tonsillectomy, right base tongue ressection, right neck dissection.  @13:30 Given report to Cinthya CHILDRESS,

## 2022-04-20 NOTE — H&P ADULT - NSCORESITESY/N_GEN_A_CORE_RD
[facility] Hospitalist History and Physical      Patient presents with:  Hypotension       PCP: Sydney Rodgers MD      History of Present Illness: Patient is a 81year old male with PMH sig for bladder cancer s/p cystoscopy / TURB / mitomycin chemotherapy 2/10/17, 2012    Dr. Quincy Orellana   • COLONOSCOPY N/A 2/1/2016    Performed by Bertram Boone MD at [address] W/ MITOMYCIN N/A 2/10/2017    Performed by Waylon Richmond MD at [facility] 29.00        Pack years: 43.5        Types: Cigarettes        Quit date: 1985        Years since quittin.1      Smokeless tobacco: Former User    Alcohol use:  Yes      Alcohol/week: 1.0 standard drinks      Types: 1 Glasses of wine per week      F Date    WBC 14.6 02/01/2020    HGB 6.2 02/01/2020    HCT 20.6 02/01/2020    .0 02/01/2020    CREATSERUM 2.74 02/01/2020    BUN 41 02/01/2020     02/01/2020    K 5.3 02/01/2020     02/01/2020    CO2 18.0 02/01/2020     02/01/2020 2/01/2020 at 13:02          Ct Abdomen+pelvis(cpt=74176)    Result Date: 2/1/2020  PROCEDURE:  CT ABDOMEN+PELVIS (CPT=74176)  COMPARISON:  None.   INDICATIONS:  post operative abdominal pain, hypotension  TECHNIQUE:  Unenhanced multislice CT scanning was pe No hemorrhagic cyst. ADRENALS:  No mass or enlargement. AORTA/VASCULAR:    Unremarkable as seen on non-contrast imaging. RETROPERITONEUM:  No mass or adenopathy. BOWEL/MESENTERY:  No visible mass, obstruction, or bowel wall thickening.   There is high densit history at this time. FINDINGS:   Median sternotomy wire overlying the right side of the chest is noted. Sequelae of valve replacement is noted. Elevation the right hemidiaphragm is noted in the interval.  Associated atelectasis is noted.    Cardiac si MATHIEU.

## 2022-04-21 ENCOUNTER — TRANSCRIPTION ENCOUNTER (OUTPATIENT)
Age: 77
End: 2022-04-21

## 2022-04-21 VITALS
HEART RATE: 84 BPM | TEMPERATURE: 98 F | RESPIRATION RATE: 18 BRPM | DIASTOLIC BLOOD PRESSURE: 71 MMHG | SYSTOLIC BLOOD PRESSURE: 158 MMHG | OXYGEN SATURATION: 95 %

## 2022-04-21 LAB
HCV AB S/CO SERPL IA: 0.05 S/CO — SIGNIFICANT CHANGE UP
HCV AB SERPL-IMP: SIGNIFICANT CHANGE UP

## 2022-04-21 PROCEDURE — 86850 RBC ANTIBODY SCREEN: CPT

## 2022-04-21 PROCEDURE — S2900: CPT

## 2022-04-21 PROCEDURE — 88309 TISSUE EXAM BY PATHOLOGIST: CPT

## 2022-04-21 PROCEDURE — 88305 TISSUE EXAM BY PATHOLOGIST: CPT

## 2022-04-21 PROCEDURE — 88304 TISSUE EXAM BY PATHOLOGIST: CPT

## 2022-04-21 PROCEDURE — 86803 HEPATITIS C AB TEST: CPT

## 2022-04-21 PROCEDURE — 88341 IMHCHEM/IMCYTCHM EA ADD ANTB: CPT

## 2022-04-21 PROCEDURE — 86900 BLOOD TYPING SEROLOGIC ABO: CPT

## 2022-04-21 PROCEDURE — 36415 COLL VENOUS BLD VENIPUNCTURE: CPT

## 2022-04-21 PROCEDURE — 86901 BLOOD TYPING SEROLOGIC RH(D): CPT

## 2022-04-21 PROCEDURE — 88331 PATH CONSLTJ SURG 1 BLK 1SPC: CPT

## 2022-04-21 RX ORDER — ACETAMINOPHEN 500 MG
30 TABLET ORAL
Qty: 600 | Refills: 0
Start: 2022-04-21 | End: 2022-04-25

## 2022-04-21 RX ORDER — ACETAMINOPHEN 500 MG
30.47 TABLET ORAL
Qty: 0 | Refills: 0 | DISCHARGE
Start: 2022-04-21

## 2022-04-21 RX ADMIN — POLYETHYLENE GLYCOL 3350 17 GRAM(S): 17 POWDER, FOR SOLUTION ORAL at 11:08

## 2022-04-21 RX ADMIN — Medication 975 MILLIGRAM(S): at 01:15

## 2022-04-21 RX ADMIN — Medication 975 MILLIGRAM(S): at 05:01

## 2022-04-21 RX ADMIN — Medication 975 MILLIGRAM(S): at 06:01

## 2022-04-21 RX ADMIN — Medication 975 MILLIGRAM(S): at 11:27

## 2022-04-21 RX ADMIN — Medication 975 MILLIGRAM(S): at 00:15

## 2022-04-21 RX ADMIN — Medication 975 MILLIGRAM(S): at 11:08

## 2022-04-21 NOTE — PROGRESS NOTE ADULT - SUBJECTIVE AND OBJECTIVE BOX
ENT Progress Note    HPI: 76M s/p b/l TORS tonsillectomy, BOT tumor excision, and right SND II-IV for SCC 4/20/22.    Interval:    04-21: seen and examined at bedside this morning. Pain is well controlled and patient is tolerating diet. NICK was not holding suction last night, placed tegaderm which resolved the issue. Patient is stable and breathing comfortably on RA.    MEDICATIONS  (STANDING):  acetaminophen    Suspension .. 975 milliGRAM(s) Oral every 6 hours  melatonin 3 milliGRAM(s) Oral at bedtime  polyethylene glycol 3350 17 Gram(s) Oral daily  senna 1 Tablet(s) Oral at bedtime    MEDICATIONS  (PRN):  morphine  - Injectable 2 milliGRAM(s) IV Push once PRN Moderate Pain (4 - 6)  ondansetron Injectable 4 milliGRAM(s) IV Push once PRN Nausea and/or Vomiting  oxyCODONE    IR 5 milliGRAM(s) Oral every 6 hours PRN Moderate Pain (4 - 6)  oxyCODONE    IR 10 milliGRAM(s) Oral every 6 hours PRN Severe Pain (7 - 10)    Vital Signs Last 24 Hrs  T(C): 36.9 (21 Apr 2022 08:45), Max: 37.1 (20 Apr 2022 16:38)  T(F): 98.5 (21 Apr 2022 08:45), Max: 98.8 (20 Apr 2022 16:38)  HR: 84 (21 Apr 2022 08:45) (84 - 96)  BP: 158/71 (21 Apr 2022 08:45) (132/68 - 158/71)  BP(mean): --  RR: 18 (21 Apr 2022 08:45) (17 - 18)  SpO2: 95% (21 Apr 2022 08:45) (95% - 98%)    Gen: NAD, resting comfortably in bed  OC/OP: tongue midline, post op changes in b/l tonsillar fossa, uvula midline, posterior oropharynx normal, no edema or bleeding  NECK: soft, flat, right neck incision with steristrips, NICK 30cc serosanguinous output, holding appropriate suction  RESPIRATORY: Respirations unlabored, no increased work of breathing with use of accessory muscles and retractions. No stridor.  CARDIAC: Warm extremities, no cyanosis.

## 2022-04-21 NOTE — PROGRESS NOTE ADULT - ASSESSMENT
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 Recovering well, pain well controlled, breathing comfortably on RA.    - Soft diet  - pain control with liquid tylenol  - SCDs  - home meds  - dc NICK  - dc home

## 2022-04-21 NOTE — DISCHARGE NOTE NURSING/CASE MANAGEMENT/SOCIAL WORK - PATIENT PORTAL LINK FT
You can access the FollowMyHealth Patient Portal offered by [facility] by registering at the following website: [email or web address]. By joining [employer]’s FollowMyHealth portal, you will also be able to view your health information using other applications (apps) compatible with our system.

## 2022-04-21 NOTE — DISCHARGE NOTE NURSING/CASE MANAGEMENT/SOCIAL WORK - NSDCPEFALRISK_GEN_ALL_CORE
For information on Fall & Injury Prevention, visit: [email or web address] OR  [email or web address] OR  https://www.cdc.gov/steadi/patient.html

## 2022-04-22 ENCOUNTER — NON-APPOINTMENT (OUTPATIENT)
Age: 77
End: 2022-04-22

## 2022-04-22 PROBLEM — I25.10 ATHEROSCLEROTIC HEART DISEASE OF NATIVE CORONARY ARTERY WITHOUT ANGINA PECTORIS: Chronic | Status: ACTIVE | Noted: 2022-04-19

## 2022-04-22 PROBLEM — K76.89 OTHER SPECIFIED DISEASES OF LIVER: Chronic | Status: ACTIVE | Noted: 2022-04-19

## 2022-04-22 PROBLEM — Z87.898 PERSONAL HISTORY OF OTHER SPECIFIED CONDITIONS: Chronic | Status: ACTIVE | Noted: 2022-04-19

## 2022-04-22 PROBLEM — R91.1 SOLITARY PULMONARY NODULE: Chronic | Status: ACTIVE | Noted: 2022-04-19

## 2022-04-22 PROBLEM — R93.2 ABNORMAL FINDINGS ON DIAGNOSTIC IMAGING OF LIVER AND BILIARY TRACT: Chronic | Status: ACTIVE | Noted: 2022-04-19

## 2022-04-22 PROBLEM — Z87.09 PERSONAL HISTORY OF OTHER DISEASES OF THE RESPIRATORY SYSTEM: Chronic | Status: ACTIVE | Noted: 2022-04-19

## 2022-04-22 PROBLEM — Z86.19 PERSONAL HISTORY OF OTHER INFECTIOUS AND PARASITIC DISEASES: Chronic | Status: ACTIVE | Noted: 2022-04-19

## 2022-04-22 PROBLEM — J06.9 ACUTE UPPER RESPIRATORY INFECTION, UNSPECIFIED: Chronic | Status: ACTIVE | Noted: 2022-04-19

## 2022-04-22 PROBLEM — H91.93 UNSPECIFIED HEARING LOSS, BILATERAL: Chronic | Status: ACTIVE | Noted: 2022-04-19

## 2022-04-22 PROBLEM — Z87.2 PERSONAL HISTORY OF DISEASES OF THE SKIN AND SUBCUTANEOUS TISSUE: Chronic | Status: ACTIVE | Noted: 2022-04-19

## 2022-04-22 PROBLEM — M76.899 OTHER SPECIFIED ENTHESOPATHIES OF UNSPECIFIED LOWER LIMB, EXCLUDING FOOT: Chronic | Status: ACTIVE | Noted: 2022-04-19

## 2022-04-22 PROBLEM — R22.1 LOCALIZED SWELLING, MASS AND LUMP, NECK: Chronic | Status: ACTIVE | Noted: 2022-04-19

## 2022-04-22 PROBLEM — C76.0 MALIGNANT NEOPLASM OF HEAD, FACE AND NECK: Chronic | Status: ACTIVE | Noted: 2022-04-19

## 2022-04-27 LAB — SURGICAL PATHOLOGY STUDY: SIGNIFICANT CHANGE UP

## 2022-04-27 RX ORDER — OXYCODONE HYDROCHLORIDE 5 MG/1
5 TABLET ORAL
Qty: 60 | Refills: 0
Start: 2022-04-27 | End: 2022-04-29

## 2022-04-28 ENCOUNTER — APPOINTMENT (OUTPATIENT)
Dept: CARDIOLOGY | Facility: CLINIC | Age: 77
End: 2022-04-28

## 2022-04-28 DIAGNOSIS — C77.0 SECONDARY AND UNSPECIFIED MALIGNANT NEOPLASM OF LYMPH NODES OF HEAD, FACE AND NECK: ICD-10-CM

## 2022-04-28 DIAGNOSIS — C80.1 MALIGNANT (PRIMARY) NEOPLASM, UNSPECIFIED: ICD-10-CM

## 2022-04-28 DIAGNOSIS — C79.89 SECONDARY MALIGNANT NEOPLASM OF OTHER SPECIFIED SITES: ICD-10-CM

## 2022-04-28 DIAGNOSIS — C76.0 MALIGNANT NEOPLASM OF HEAD, FACE AND NECK: ICD-10-CM

## 2022-05-17 ENCOUNTER — APPOINTMENT (OUTPATIENT)
Dept: OTOLARYNGOLOGY | Facility: CLINIC | Age: 77
End: 2022-05-17

## 2022-06-28 ENCOUNTER — APPOINTMENT (OUTPATIENT)
Dept: OTOLARYNGOLOGY | Facility: CLINIC | Age: 77
End: 2022-06-28

## 2022-06-28 VITALS
SYSTOLIC BLOOD PRESSURE: 152 MMHG | DIASTOLIC BLOOD PRESSURE: 76 MMHG | TEMPERATURE: 98.2 F | OXYGEN SATURATION: 98 % | HEIGHT: 66 IN | BODY MASS INDEX: 22.5 KG/M2 | HEART RATE: 80 BPM | WEIGHT: 140 LBS

## 2022-06-28 PROCEDURE — 99024 POSTOP FOLLOW-UP VISIT: CPT

## 2022-06-28 PROCEDURE — 31575 DIAGNOSTIC LARYNGOSCOPY: CPT | Mod: 58,52

## 2022-06-29 NOTE — REASON FOR VISIT
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  Final pathology reveals 1 out of 14 lymph nodes positive which was 5.5 cm no extranodal extension all margins were negative with a 2 mm margin.  We presented at the multidisciplinary tumor board and also in consultation with Dr. Lovell from [facility] who is his radiation oncologist and we recommended observation given his favorable pathologic findings presented today for follow-up visit and overall is doing well.  He has returned to a normal diet he states his taste is slightly altered and I expect this may be due to ongoing healing and I would expect his taste to fully returned to normal.  He is also having some mild pains in the neck as expected with postop surgical sequela.  He did state he has some burning and choking when drinking water with lemon in the mornings and I recommended he avoid lemon water and drink regular water for the time being as he was not having any issues with regular water.  Interestingly he reports significant xerostomia which does not make sense to me given the fact we have not removed any salivary gland tissue he had good saliva flow from bilateral Stensen and Ava's ducts today on exam, it is not common for people to complain of dry mouth after transoral robotic surgery and I counseled him on hydration and other reasons why he may have a dry mouth.  Overall he is doing well I would like to see him back in 6 weeks for fiberoptic exam and at that point he will be ready for surveillance CT scan with contrast. \par \par I did perform a fiberoptic laryngoscopy today to evaluate the choking sensation he had and he appears to be healing nicely there is a small area in the midline which is continuing to heal however his larynx is normal I see no pathology suspicious lesions masses or other abnormalities and overall he did not have significant aspiration on fiberoptic exam or other pathological findings which would explain the choking sensation.  Perhaps due to altered sensation in the back to tongue he is having some low-level aspiration we will continue to monitor this as he is healing, I suspect he will improve over time.

## 2022-08-17 ENCOUNTER — APPOINTMENT (OUTPATIENT)
Dept: CT IMAGING | Facility: HOSPITAL | Age: 77
End: 2022-08-17

## 2022-08-17 ENCOUNTER — OUTPATIENT (OUTPATIENT)
Dept: OUTPATIENT SERVICES | Facility: HOSPITAL | Age: 77
LOS: 1 days | End: 2022-08-17
Payer: MEDICARE

## 2022-08-17 DIAGNOSIS — Z98.890 OTHER SPECIFIED POSTPROCEDURAL STATES: Chronic | ICD-10-CM

## 2022-08-17 DIAGNOSIS — C76.0 MALIGNANT NEOPLASM OF HEAD, FACE AND NECK: ICD-10-CM

## 2022-08-17 DIAGNOSIS — Z90.49 ACQUIRED ABSENCE OF OTHER SPECIFIED PARTS OF DIGESTIVE TRACT: Chronic | ICD-10-CM

## 2022-08-17 PROCEDURE — 70491 CT SOFT TISSUE NECK W/DYE: CPT | Mod: MH

## 2022-08-17 PROCEDURE — 70491 CT SOFT TISSUE NECK W/DYE: CPT | Mod: 26,MH

## 2022-08-25 ENCOUNTER — RESULT REVIEW (OUTPATIENT)
Age: 77
End: 2022-08-25

## 2022-08-30 ENCOUNTER — RESULT REVIEW (OUTPATIENT)
Age: 77
End: 2022-08-30

## 2022-08-30 ENCOUNTER — APPOINTMENT (OUTPATIENT)
Dept: ULTRASOUND IMAGING | Facility: IMAGING CENTER | Age: 77
End: 2022-08-30

## 2022-08-30 ENCOUNTER — OUTPATIENT (OUTPATIENT)
Dept: OUTPATIENT SERVICES | Facility: HOSPITAL | Age: 77
LOS: 1 days | End: 2022-08-30
Payer: MEDICARE

## 2022-08-30 DIAGNOSIS — Z90.49 ACQUIRED ABSENCE OF OTHER SPECIFIED PARTS OF DIGESTIVE TRACT: Chronic | ICD-10-CM

## 2022-08-30 DIAGNOSIS — Z98.890 OTHER SPECIFIED POSTPROCEDURAL STATES: Chronic | ICD-10-CM

## 2022-08-30 DIAGNOSIS — R22.1 LOCALIZED SWELLING, MASS AND LUMP, NECK: ICD-10-CM

## 2022-08-30 PROCEDURE — 76942 ECHO GUIDE FOR BIOPSY: CPT

## 2022-08-30 PROCEDURE — 76942 ECHO GUIDE FOR BIOPSY: CPT | Mod: 26

## 2022-08-30 PROCEDURE — 88305 TISSUE EXAM BY PATHOLOGIST: CPT

## 2022-08-30 PROCEDURE — 88305 TISSUE EXAM BY PATHOLOGIST: CPT | Mod: 26

## 2022-08-30 PROCEDURE — 38505 NEEDLE BIOPSY LYMPH NODES: CPT | Mod: RT

## 2022-08-30 PROCEDURE — 88173 CYTOPATH EVAL FNA REPORT: CPT

## 2022-08-30 PROCEDURE — 88173 CYTOPATH EVAL FNA REPORT: CPT | Mod: 26

## 2022-08-30 PROCEDURE — 38505 NEEDLE BIOPSY LYMPH NODES: CPT

## 2022-08-30 PROCEDURE — 88172 CYTP DX EVAL FNA 1ST EA SITE: CPT

## 2022-09-13 ENCOUNTER — APPOINTMENT (OUTPATIENT)
Dept: OTOLARYNGOLOGY | Facility: CLINIC | Age: 77
End: 2022-09-13

## 2023-01-10 ENCOUNTER — APPOINTMENT (OUTPATIENT)
Dept: FAMILY MEDICINE | Facility: CLINIC | Age: 78
End: 2023-01-10
Payer: MEDICARE

## 2023-01-10 VITALS
HEART RATE: 65 BPM | HEIGHT: 68 IN | OXYGEN SATURATION: 97 % | TEMPERATURE: 97.2 F | DIASTOLIC BLOOD PRESSURE: 80 MMHG | WEIGHT: 149 LBS | SYSTOLIC BLOOD PRESSURE: 132 MMHG | RESPIRATION RATE: 16 BRPM | BODY MASS INDEX: 22.58 KG/M2

## 2023-01-10 DIAGNOSIS — Z85.89 PERSONAL HISTORY OF MALIGNANT NEOPLASM OF OTHER ORGANS AND SYSTEMS: ICD-10-CM

## 2023-01-10 PROCEDURE — G0439: CPT

## 2023-01-10 PROCEDURE — 36415 COLL VENOUS BLD VENIPUNCTURE: CPT

## 2023-01-16 NOTE — PHYSICAL EXAM
[Well Nourished] : well nourished [Well Developed] : well developed [Well-Appearing] : well-appearing [Normal Sclera/Conjunctiva] : normal sclera/conjunctiva [PERRL] : pupils equal round and reactive to light [EOMI] : extraocular movements intact [Normal Outer Ear/Nose] : the outer ears and nose were normal in appearance [Normal Oropharynx] : the oropharynx was normal [No JVD] : no jugular venous distention [No Lymphadenopathy] : no lymphadenopathy [Supple] : supple [No Respiratory Distress] : no respiratory distress  [No Accessory Muscle Use] : no accessory muscle use [Clear to Auscultation] : lungs were clear to auscultation bilaterally [Normal Rate] : normal rate  [Regular Rhythm] : with a regular rhythm [Normal S1, S2] : normal S1 and S2 [No Murmur] : no murmur heard [No Varicosities] : no varicosities [No Edema] : there was no peripheral edema [Soft] : abdomen soft [Non Tender] : non-tender [Non-distended] : non-distended [No Masses] : no abdominal mass palpated [No HSM] : no HSM [Normal Bowel Sounds] : normal bowel sounds [Declined Rectal Exam] : declined rectal exam [Normal Posterior Cervical Nodes] : no posterior cervical lymphadenopathy [Normal Anterior Cervical Nodes] : no anterior cervical lymphadenopathy [No CVA Tenderness] : no CVA  tenderness [No Spinal Tenderness] : no spinal tenderness [No Joint Swelling] : no joint swelling [Grossly Normal Strength/Tone] : grossly normal strength/tone [No Rash] : no rash [Coordination Grossly Intact] : coordination grossly intact [No Focal Deficits] : no focal deficits [Normal Gait] : normal gait [Deep Tendon Reflexes (DTR)] : deep tendon reflexes were 2+ and symmetric [Alert and Oriented x3] : oriented to person, place, and time [Normal] : affect was normal and insight and judgment were intact [de-identified] : declined - done by LEATHA

## 2023-01-16 NOTE — HISTORY OF PRESENT ILLNESS
[FreeTextEntry1] : cc: MICHELLE  [de-identified] : Patient presented for physical. He is  CP,SOB,Abd pain, no N,V,C,D. Pt is not vacc for  COVID .Pt f/u with . Pt f/u with ENT tongue mass, s/p Sx ,doing well.

## 2023-01-16 NOTE — HEALTH RISK ASSESSMENT
[Very Good] : ~his/her~  mood as very good [Never] : Never [Yes] : Yes [Monthly or less (1 pt)] : Monthly or less (1 point) [1 or 2 (0 pts)] : 1 or 2 (0 points) [Never (0 pts)] : Never (0 points) [No] : In the past 12 months have you used drugs other than those required for medical reasons? No [No falls in past year] : Patient reported no falls in the past year [0] : 2) Feeling down, depressed, or hopeless: Not at all (0) [PHQ-2 Negative - No further assessment needed] : PHQ-2 Negative - No further assessment needed [Patient reported colonoscopy was normal] : Patient reported colonoscopy was normal [None] : None [With Significant Other] : lives with significant other [With Family] : lives with family [Employed] : employed [College] : College [] :  [# Of Children ___] : has [unfilled] children [Feels Safe at Home] : Feels safe at home [Independent] : managing finances [Carbon Monoxide Detector] : carbon monoxide detector [Seat Belt] :  uses seat belt [Sunscreen] : uses sunscreen [With Patient/Caregiver] : , with patient/caregiver [Aggressive treatment] : aggressive treatment [FreeTextEntry1] : none [de-identified] : No [de-identified] : , ENT,Cardiol  [Audit-CScore] : 1 [de-identified] : walking , exercise  [de-identified] : healthy  [[id number]] : 0 [Change in mental status noted] : No change in mental status noted [Language] : denies difficulty with language [Reports changes in hearing] : Reports no changes in hearing [Reports changes in vision] : Reports no changes in vision [Reports changes in dental health] : Reports no changes in dental health [Smoke Detector] : no smoke detector [MammogramDate] : NA [PapSmearDate] : NA [BoneDensityDate] : never [ColonoscopyDate] : 12/20 [HIVDate] : 08/18 [HepatitisCDate] : 12/18 [AdvancecareDate] : 01/23

## 2023-01-18 LAB
ALBUMIN SERPL ELPH-MCNC: 4.7 G/DL
ALP BLD-CCNC: 87 U/L
ALT SERPL-CCNC: 12 U/L
ANION GAP SERPL CALC-SCNC: 12 MMOL/L
APPEARANCE: CLEAR
AST SERPL-CCNC: 18 U/L
BASOPHILS # BLD AUTO: 0.06 K/UL
BASOPHILS NFR BLD AUTO: 1 %
BILIRUB SERPL-MCNC: 0.7 MG/DL
BILIRUBIN URINE: NEGATIVE
BLOOD URINE: NEGATIVE
BUN SERPL-MCNC: 14 MG/DL
CALCIUM SERPL-MCNC: 10.1 MG/DL
CHLORIDE SERPL-SCNC: 105 MMOL/L
CHOLEST SERPL-MCNC: 138 MG/DL
CO2 SERPL-SCNC: 25 MMOL/L
COLOR: COLORLESS
CREAT SERPL-MCNC: 0.95 MG/DL
EGFR: 83 ML/MIN/1.73M2
EOSINOPHIL # BLD AUTO: 0.12 K/UL
EOSINOPHIL NFR BLD AUTO: 2 %
GLUCOSE QUALITATIVE U: NEGATIVE
GLUCOSE SERPL-MCNC: 103 MG/DL
HCT VFR BLD CALC: 46.9 %
HDLC SERPL-MCNC: 53 MG/DL
HGB BLD-MCNC: 15.3 G/DL
IMM GRANULOCYTES NFR BLD AUTO: 0.2 %
KETONES URINE: NEGATIVE
LDLC SERPL CALC-MCNC: 68 MG/DL
LEUKOCYTE ESTERASE URINE: NEGATIVE
LYMPHOCYTES # BLD AUTO: 1.56 K/UL
LYMPHOCYTES NFR BLD AUTO: 25.4 %
MAN DIFF?: NORMAL
MCHC RBC-ENTMCNC: 32.4 PG
MCHC RBC-ENTMCNC: 32.6 GM/DL
MCV RBC AUTO: 99.4 FL
MONOCYTES # BLD AUTO: 0.56 K/UL
MONOCYTES NFR BLD AUTO: 9.1 %
NEUTROPHILS # BLD AUTO: 3.83 K/UL
NEUTROPHILS NFR BLD AUTO: 62.3 %
NITRITE URINE: NEGATIVE
NONHDLC SERPL-MCNC: 85 MG/DL
PH URINE: 7
PLATELET # BLD AUTO: 226 K/UL
POTASSIUM SERPL-SCNC: 5.1 MMOL/L
PROT SERPL-MCNC: 7.2 G/DL
PROTEIN URINE: NEGATIVE
PSA SERPL-MCNC: 2.82 NG/ML
RBC # BLD: 4.72 M/UL
RBC # FLD: 12.8 %
SODIUM SERPL-SCNC: 141 MMOL/L
SPECIFIC GRAVITY URINE: 1
TRIGL SERPL-MCNC: 83 MG/DL
TSH SERPL-ACNC: 1.65 UIU/ML
UROBILINOGEN URINE: NORMAL
WBC # FLD AUTO: 6.14 K/UL

## 2023-01-31 ENCOUNTER — APPOINTMENT (OUTPATIENT)
Dept: FAMILY MEDICINE | Facility: CLINIC | Age: 78
End: 2023-01-31
Payer: MEDICARE

## 2023-01-31 VITALS
HEART RATE: 85 BPM | SYSTOLIC BLOOD PRESSURE: 132 MMHG | HEIGHT: 68 IN | RESPIRATION RATE: 17 BRPM | DIASTOLIC BLOOD PRESSURE: 62 MMHG | OXYGEN SATURATION: 96 % | WEIGHT: 149 LBS | BODY MASS INDEX: 22.58 KG/M2 | TEMPERATURE: 97.4 F

## 2023-01-31 PROCEDURE — 99214 OFFICE O/P EST MOD 30 MIN: CPT

## 2023-01-31 RX ORDER — BENZONATATE 200 MG/1
200 CAPSULE ORAL 3 TIMES DAILY
Qty: 60 | Refills: 1 | Status: DISCONTINUED | COMMUNITY
Start: 2023-01-31 | End: 2023-01-31

## 2023-01-31 NOTE — HISTORY OF PRESENT ILLNESS
[FreeTextEntry8] : cc: chronic cough \par Pt c/o productive cough few days,pt took OTC meds no improvement, . Patient deneis fever, no CP,no SOB,no abd pain. C/o worsenning productive cough, Covid test had  negative

## 2023-01-31 NOTE — REVIEW OF SYSTEMS
[Shortness Of Breath] : no shortness of breath [Wheezing] : no wheezing [Cough] : cough [Dyspnea on Exertion] : not dyspnea on exertion [Negative] : Neurological [FreeTextEntry4] : right neck mass , s/p Bx 02/09/22

## 2023-01-31 NOTE — PHYSICAL EXAM
[No Acute Distress] : no acute distress [No Respiratory Distress] : no respiratory distress  [No Accessory Muscle Use] : no accessory muscle use [No Varicosities] : no varicosities [No Edema] : there was no peripheral edema [Normal] : affect was normal and insight and judgment were intact [de-identified] : right neck pass  [de-identified] : b/l LL + rhonchi

## 2023-01-31 NOTE — HEALTH RISK ASSESSMENT
[Never] : Never [Yes] : Yes [Monthly or less (1 pt)] : Monthly or less (1 point) [1 or 2 (0 pts)] : 1 or 2 (0 points) [Never (0 pts)] : Never (0 points) [No] : In the past 12 months have you used drugs other than those required for medical reasons? No [No falls in past year] : Patient reported no falls in the past year [0] : 2) Feeling down, depressed, or hopeless: Not at all (0) [Audit-CScore] : 1 ( rare) [de-identified] : walking  [de-identified] : regular  [[id number]] : 0

## 2023-02-07 ENCOUNTER — APPOINTMENT (OUTPATIENT)
Dept: FAMILY MEDICINE | Facility: CLINIC | Age: 78
End: 2023-02-07
Payer: MEDICARE

## 2023-02-07 DIAGNOSIS — R05.3 CHRONIC COUGH: ICD-10-CM

## 2023-02-07 DIAGNOSIS — R19.7 DIARRHEA, UNSPECIFIED: ICD-10-CM

## 2023-02-07 PROCEDURE — 99443: CPT | Mod: 95

## 2023-02-07 RX ORDER — PREDNISONE 20 MG/1
20 TABLET ORAL DAILY
Qty: 10 | Refills: 0 | Status: COMPLETED | COMMUNITY
Start: 2023-01-31 | End: 2023-02-07

## 2023-02-07 RX ORDER — AMOXICILLIN AND CLAVULANATE POTASSIUM 875; 125 MG/1; MG/1
875-125 TABLET, COATED ORAL
Qty: 14 | Refills: 0 | Status: COMPLETED | COMMUNITY
Start: 2023-01-31 | End: 2023-02-07

## 2023-02-07 NOTE — PHYSICAL EXAM
[No Acute Distress] : no acute distress [de-identified] : unable due to TTM , coughing during the encounter , completing sentences

## 2023-02-07 NOTE — HEALTH RISK ASSESSMENT
[No] : No [No falls in past year] : Patient reported no falls in the past year [de-identified] : active [de-identified] : healthy  [Never] : Never

## 2023-02-07 NOTE — REVIEW OF SYSTEMS
[Fever] : no fever [Chills] : no chills [Fatigue] : fatigue [Night Sweats] : no night sweats [Shortness Of Breath] : no shortness of breath [Wheezing] : no wheezing [Cough] : cough [Dyspnea on Exertion] : not dyspnea on exertion [Abdominal Pain] : no abdominal pain [Nausea] : nausea [Constipation] : no constipation [Diarrhea] : diarrhea [Vomiting] : no vomiting [Heartburn] : no heartburn [Melena] : no melena [Negative] : Psychiatric

## 2023-02-07 NOTE — HISTORY OF PRESENT ILLNESS
[Home] : at home, [unfilled] , at the time of the visit. [Medical Office: ([facility])___] : at the medical office located in  [Verbal consent obtained from patient] : the patient, [unfilled] [FreeTextEntry8] : Encounter conducted in Polish. \par cc: cough, diarrhea, \par Pt called c/o still not feeling  good, he was seen last week dgn with bronchitis  completed Abx , but he has had diarrhea with it, when he stopped - no diarrhea, pt still c.o cough , dry , worse  when he is outside and night,talk more, He denies fever, chills. Pt with HX of lung nodules, last Ct chest 12/2021, non smoker,

## 2023-02-14 ENCOUNTER — APPOINTMENT (OUTPATIENT)
Dept: CT IMAGING | Facility: HOSPITAL | Age: 78
End: 2023-02-14
Payer: MEDICARE

## 2023-02-14 ENCOUNTER — OUTPATIENT (OUTPATIENT)
Dept: OUTPATIENT SERVICES | Facility: HOSPITAL | Age: 78
LOS: 1 days | End: 2023-02-14
Payer: MEDICARE

## 2023-02-14 DIAGNOSIS — Z98.890 OTHER SPECIFIED POSTPROCEDURAL STATES: Chronic | ICD-10-CM

## 2023-02-14 DIAGNOSIS — Z90.49 ACQUIRED ABSENCE OF OTHER SPECIFIED PARTS OF DIGESTIVE TRACT: Chronic | ICD-10-CM

## 2023-02-14 DIAGNOSIS — R19.7 DIARRHEA, UNSPECIFIED: ICD-10-CM

## 2023-02-14 DIAGNOSIS — R91.1 SOLITARY PULMONARY NODULE: ICD-10-CM

## 2023-02-14 PROCEDURE — 71250 CT THORAX DX C-: CPT | Mod: 26,MH

## 2023-02-14 PROCEDURE — 71250 CT THORAX DX C-: CPT

## 2023-02-15 NOTE — BRIEF OPERATIVE NOTE - NSICDXBRIEFOPLAUNCH_GEN_ALL_CORE
<--- Click to Launch ICDx for PreOp, PostOp and Procedure Hatchet Flap Text: The defect edges were debeveled with a #15 scalpel blade.  Given the location of the defect, shape of the defect and the proximity to free margins a hatchet flap was deemed most appropriate.  Using a sterile surgical marker, an appropriate hatchet flap was drawn incorporating the defect and placing the expected incisions within the relaxed skin tension lines where possible.    The area thus outlined was incised deep to adipose tissue with a #15 scalpel blade.  The skin margins were undermined to an appropriate distance in all directions utilizing iris scissors.

## 2023-02-16 ENCOUNTER — NON-APPOINTMENT (OUTPATIENT)
Age: 78
End: 2023-02-16

## 2023-02-16 LAB
ALBUMIN SERPL ELPH-MCNC: 4.1 G/DL
ALP BLD-CCNC: 86 U/L
ALT SERPL-CCNC: 11 U/L
AMYLASE/CREAT SERPL: 50 U/L
ANION GAP SERPL CALC-SCNC: 13 MMOL/L
AST SERPL-CCNC: 11 U/L
BASOPHILS # BLD AUTO: 0.04 K/UL
BASOPHILS NFR BLD AUTO: 0.5 %
BILIRUB SERPL-MCNC: 0.7 MG/DL
BUN SERPL-MCNC: 16 MG/DL
CALCIUM SERPL-MCNC: 9.3 MG/DL
CHLORIDE SERPL-SCNC: 102 MMOL/L
CHOLEST SERPL-MCNC: 124 MG/DL
CO2 SERPL-SCNC: 26 MMOL/L
CREAT SERPL-MCNC: 1.04 MG/DL
EGFR: 74 ML/MIN/1.73M2
EOSINOPHIL # BLD AUTO: 0.1 K/UL
EOSINOPHIL NFR BLD AUTO: 1.2 %
GLUCOSE SERPL-MCNC: 84 MG/DL
HAV IGM SER QL: NONREACTIVE
HBV CORE IGM SER QL: NONREACTIVE
HBV SURFACE AG SER QL: NONREACTIVE
HCT VFR BLD CALC: 43.3 %
HCV AB SER QL: NONREACTIVE
HCV S/CO RATIO: 0.07 S/CO
HDLC SERPL-MCNC: 50 MG/DL
HGB BLD-MCNC: 14.5 G/DL
IMM GRANULOCYTES NFR BLD AUTO: 0.2 %
LDLC SERPL CALC-MCNC: 60 MG/DL
LPL SERPL-CCNC: 33 U/L
LYMPHOCYTES # BLD AUTO: 1.82 K/UL
LYMPHOCYTES NFR BLD AUTO: 22.4 %
MAN DIFF?: NORMAL
MCHC RBC-ENTMCNC: 32.1 PG
MCHC RBC-ENTMCNC: 33.5 GM/DL
MCV RBC AUTO: 95.8 FL
MONOCYTES # BLD AUTO: 0.96 K/UL
MONOCYTES NFR BLD AUTO: 11.8 %
NEUTROPHILS # BLD AUTO: 5.2 K/UL
NEUTROPHILS NFR BLD AUTO: 63.9 %
NONHDLC SERPL-MCNC: 74 MG/DL
PLATELET # BLD AUTO: 259 K/UL
POTASSIUM SERPL-SCNC: 4.1 MMOL/L
PROT SERPL-MCNC: 7 G/DL
RAPID RVP RESULT: NOT DETECTED
RBC # BLD: 4.52 M/UL
RBC # FLD: 12.4 %
SARS-COV-2 RNA PNL RESP NAA+PROBE: NOT DETECTED
SODIUM SERPL-SCNC: 140 MMOL/L
TRIGL SERPL-MCNC: 70 MG/DL
WBC # FLD AUTO: 8.14 K/UL

## 2023-02-18 ENCOUNTER — NON-APPOINTMENT (OUTPATIENT)
Age: 78
End: 2023-02-18

## 2023-02-19 LAB
C DIFF TOX B STL QL CT TISS CULT: NORMAL
Lab: NORMAL

## 2023-02-21 ENCOUNTER — NON-APPOINTMENT (OUTPATIENT)
Age: 78
End: 2023-02-21

## 2023-02-24 ENCOUNTER — APPOINTMENT (OUTPATIENT)
Dept: PULMONOLOGY | Facility: CLINIC | Age: 78
End: 2023-02-24
Payer: MEDICARE

## 2023-02-24 VITALS
OXYGEN SATURATION: 98 % | DIASTOLIC BLOOD PRESSURE: 78 MMHG | SYSTOLIC BLOOD PRESSURE: 130 MMHG | BODY MASS INDEX: 22.58 KG/M2 | RESPIRATION RATE: 16 BRPM | HEART RATE: 86 BPM | TEMPERATURE: 97 F | WEIGHT: 149 LBS | HEIGHT: 68 IN

## 2023-02-24 PROCEDURE — 99215 OFFICE O/P EST HI 40 MIN: CPT

## 2023-02-24 RX ORDER — HYDROCODONE BITARTRATE AND HOMATROPINE METHYLBROMIDE 1.5; 5 MG/5ML; MG/5ML
5-1.5 SOLUTION ORAL 3 TIMES DAILY
Qty: 1 | Refills: 0 | Status: COMPLETED | COMMUNITY
Start: 2023-02-07 | End: 2023-02-24

## 2023-02-24 RX ORDER — PROMETHAZINE HYDROCHLORIDE AND DEXTROMETHORPHAN HYDROBROMIDE ORAL SOLUTION 15; 6.25 MG/5ML; MG/5ML
6.25-15 SOLUTION ORAL
Qty: 1 | Refills: 0 | Status: COMPLETED | COMMUNITY
Start: 2023-01-31 | End: 2023-02-24

## 2023-02-24 NOTE — REVIEW OF SYSTEMS
[Cough] : cough [Fever] : no fever [Fatigue] : no fatigue [Recent Wt Gain (___ Lbs)] : ~T no recent weight gain [Chills] : no chills [Poor Appetite] : no poor appetite [Recent Wt Loss (___ Lbs)] : ~T no recent weight loss [Epistaxis] : no epistaxis [Sore Throat] : no sore throat [Dry Mouth] : no dry mouth [Hemoptysis] : no hemoptysis [Sputum] : no sputum [Dyspnea] : no dyspnea [Wheezing] : no wheezing [SOB on Exertion] : no sob on exertion [GERD] : no gerd [Abdominal Pain] : no abdominal pain [Nausea] : no nausea [Vomiting] : no vomiting [Dysphagia] : no dysphagia [Bleeding] : no bleeding [Myalgias] : no myalgias [Chronic Pain] : no chronic pain [Rash] : no rash [Blood Transfusion] : no blood transfusion [Clotting Disorder/ Frequent bleeding] : no clotting disorder/ frequent bleeding [Diabetes] : no diabetes [Thyroid Problem] : no thyroid problem [Obesity] : no obesity

## 2023-02-24 NOTE — PHYSICAL EXAM
[Supple] : supple [No Neck Mass] : no neck mass [No JVD] : no jvd [Normal S1, S2] : normal s1, s2 [Clear to Auscultation Bilaterally] : clear to auscultation bilaterally [Benign] : benign [No Masses] : no masses [Soft] : soft [No Hernias] : no hernias [Normal Bowel Sounds] : normal bowel sounds [Normal Gait] : normal gait [No Clubbing] : no clubbing [No Edema] : no edema [Normal Color/ Pigmentation] : normal color/ pigmentation [No Focal Deficits] : no focal deficits [Normal Affect] : normal affect [TextBox_2] : pleasant male no distress no cough   speaking full setences [TextBox_11] : moist no lesion

## 2023-02-24 NOTE — REASON FOR VISIT
[Initial] : an initial visit [Cough] : cough [Family Member] : family member [Abnormal CXR/ Chest CT] : an abnormal CXR/ chest CT [Bronchiectasis] : bronchiectasis

## 2023-02-24 NOTE — HISTORY OF PRESENT ILLNESS
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 Final pathology reveals 1 out of 14 lymph nodes positive which was 5.5 cm no extranodal extension all margins were negative with a 2 mm margin. We presented at the multidisciplinary tumor board and also in consultation with Dr. Lovell from [facility] who is his radiation oncologist and we recommended observation given his favorable pathologic findings presented today for follow-up visit and overall is doing well. H  -   father    with  TB  -    patient at [age] years old was sick no treatment -   here  for  cough \par \par -h/o covid  Jan 2021  (vaccinated no )  - \par - in winter cough     weather

## 2023-02-24 NOTE — ASSESSMENT
[FreeTextEntry1] : 76y/o   male born in [place]\par \par 1- post -infectious   bronchiectasis / father with h/o TB \par 2- likley element of asthmatic bronchitis \par 3-  h/o   tonsilar  cancer s/p resection \par 4- ct   2/23 RML   atelecatsis mucous    [facility]\par 5-  vaccinations per primary \par \par Recommendations\par 1- immunoglobulin level    sputum  afb  gram stain     tb   \par 2- add nebulizer - normal saline q12  and duonebs q 8 prn \par 3- f/u ct three months\par \par -agreement on plan

## 2023-03-01 RX ORDER — IPRATROPIUM BROMIDE AND ALBUTEROL SULFATE 2.5; .5 MG/3ML; MG/3ML
0.5-2.5 (3) SOLUTION RESPIRATORY (INHALATION)
Qty: 1080 | Refills: 5 | Status: DISCONTINUED | COMMUNITY
Start: 2023-02-24 | End: 2023-03-01

## 2023-03-02 RX ORDER — ALBUTEROL SULFATE 2.5 MG/3ML
(2.5 MG/3ML) SOLUTION RESPIRATORY (INHALATION)
Qty: 1620 | Refills: 3 | Status: ACTIVE | COMMUNITY
Start: 2023-03-01 | End: 1900-01-01

## 2023-03-03 ENCOUNTER — APPOINTMENT (OUTPATIENT)
Dept: CARDIOLOGY | Facility: CLINIC | Age: 78
End: 2023-03-03
Payer: MEDICARE

## 2023-03-03 ENCOUNTER — NON-APPOINTMENT (OUTPATIENT)
Age: 78
End: 2023-03-03

## 2023-03-03 VITALS
TEMPERATURE: 96.3 F | HEIGHT: 68 IN | RESPIRATION RATE: 18 BRPM | WEIGHT: 149 LBS | OXYGEN SATURATION: 98 % | SYSTOLIC BLOOD PRESSURE: 134 MMHG | DIASTOLIC BLOOD PRESSURE: 70 MMHG | BODY MASS INDEX: 22.58 KG/M2 | HEART RATE: 70 BPM

## 2023-03-03 DIAGNOSIS — I25.10 ATHEROSCLEROTIC HEART DISEASE OF NATIVE CORONARY ARTERY W/OUT ANGINA PECTORIS: ICD-10-CM

## 2023-03-03 DIAGNOSIS — I25.84 ATHEROSCLEROTIC HEART DISEASE OF NATIVE CORONARY ARTERY W/OUT ANGINA PECTORIS: ICD-10-CM

## 2023-03-03 PROCEDURE — 99213 OFFICE O/P EST LOW 20 MIN: CPT

## 2023-03-03 PROCEDURE — 93000 ELECTROCARDIOGRAM COMPLETE: CPT

## 2023-03-03 RX ORDER — IPRATROPIUM BROMIDE 0.5 MG/2.5ML
0.02 SOLUTION RESPIRATORY (INHALATION)
Qty: 1620 | Refills: 3 | Status: DISCONTINUED | COMMUNITY
Start: 2023-03-01 | End: 2023-03-03

## 2023-03-04 PROBLEM — I25.10 CORONARY ARTERY CALCIFICATION: Status: ACTIVE | Noted: 2022-04-16

## 2023-03-04 NOTE — HISTORY OF PRESENT ILLNESS
[FreeTextEntry1] : Jay Jay Mahajan is a 77 year old man with recent diagnosis of right neck mass/squamous cell cancer s/p transoral robotic surgery tonsillectomy and neck dissection and history of Coronary calcifications presents for follow-up visit.\par \par Since his last visit he reports that he tolerated the surgery well. His son, Nemesio is present over the phone to provide additional information. The patient reports that he is now in remission from cancer and that he did not require chemotherapy or radiation. He reports being active, exercises and does not experience exertional chest pain or shortness of breath.

## 2023-03-04 NOTE — PHYSICAL EXAM
[Normal Conjunctiva] : normal conjunctiva [Normal Gait] : normal gait [No Edema] : no edema [Normal] : alert and oriented, normal memory [de-identified] : elderly man, no acute distress  [de-identified] : right neck mass, no carotid bruits b/l [de-identified] : JVP ~ 7 cm H20, RRR, s1, s2, no murmurs/rubs [de-identified] : unlabored respirations, clear lung fields  [de-identified] : non-distended

## 2023-03-04 NOTE — ASSESSMENT
[FreeTextEntry1] : Assessment:\par Jay Jay Mahajan is a 77 year old man with recent diagnosis of right neck mass/squamous cell cancer s/p transoral robotic surgery tonsillectomy and neck dissection and history of Coronary calcifications presents for follow-up visit.\par \par Since his last visit he reports that he tolerated the surgery well. His son, Nemesio is present over the phone to provide additional information. The patient reports that he is now in remission from cancer and that he did not require chemotherapy or radiation. He reports being active, exercises and does not experience exertional chest pain or shortness of breath. ECG today consistent with normal sinus rhythm. BP within normal limits. Prior echocardiogram (4/2022) consistent with normal LVEF 60-65%, mild septal LVH, mildly dilated aortic root (3.8 cm). Discussed with patient and son Nemesio that he would benefit from further cardiac risk stratification to evaluate severity of coronary calcifications such as stress testing or CCTA but he declines at this time as he reports that he is asymptomatic. \par \par Recommendations:\par [] Coronary calcifications: Patient with coronary calcifications on CT chest, and would benefit from statin as part of primary prevention for atherosclerotic cardiovascular disease, to prevent stroke or myocardial infarction in future. Patient and son prefer to hold off on starting statin at this time. They also do not want to proceed with additional cardiac testing such as coronary CT angiogram or stress testing.\par [] Return to office: 9 months or sooner if needed

## 2023-03-04 NOTE — CARDIOLOGY SUMMARY
[de-identified] : ECG (4/14/22): normal sinus rhythm \par ECG (3/3/23): normal sinus rhythm [de-identified] : TTE (4/14/22): LVEF 60-65%, mild asymmetric septal LVH. Normal RV size and function. Mobile interatrial septum. Mildly dilated aortic root (3.8 cm). No pericardial effusion.

## 2023-03-04 NOTE — REVIEW OF SYSTEMS
[Headache] : no headache [Blurry Vision] : no blurred vision [SOB] : no shortness of breath [Chest Discomfort] : no chest discomfort [Lower Ext Edema] : no extremity edema [Palpitations] : no palpitations [Cough] : no cough [Abdominal Pain] : no abdominal pain [Rash] : no rash [Dizziness] : no dizziness [Confusion] : no confusion was observed

## 2023-03-05 LAB
DEPRECATED KAPPA LC FREE/LAMBDA SER: 1.99 RATIO
IGA SER QL IEP: 283 MG/DL
IGG SER QL IEP: 1151 MG/DL
IGM SER QL IEP: 58 MG/DL
KAPPA LC CSF-MCNC: 1.33 MG/DL
KAPPA LC SERPL-MCNC: 2.65 MG/DL
M TB IFN-G BLD-IMP: NEGATIVE
QUANTIFERON TB PLUS MITOGEN MINUS NIL: 4.99 IU/ML
QUANTIFERON TB PLUS NIL: 2.12 IU/ML
QUANTIFERON TB PLUS TB1 MINUS NIL: -0.83 IU/ML
QUANTIFERON TB PLUS TB2 MINUS NIL: -0.87 IU/ML

## 2023-03-08 ENCOUNTER — EMERGENCY (EMERGENCY)
Facility: HOSPITAL | Age: 78
LOS: 1 days | Discharge: HOME | End: 2023-03-08
Attending: EMERGENCY MEDICINE | Admitting: EMERGENCY MEDICINE
Payer: MEDICARE

## 2023-03-08 VITALS
OXYGEN SATURATION: 98 % | RESPIRATION RATE: 18 BRPM | WEIGHT: 162.04 LBS | TEMPERATURE: 98 F | DIASTOLIC BLOOD PRESSURE: 77 MMHG | HEART RATE: 73 BPM | SYSTOLIC BLOOD PRESSURE: 176 MMHG | HEIGHT: 68 IN

## 2023-03-08 VITALS
OXYGEN SATURATION: 100 % | TEMPERATURE: 98 F | RESPIRATION RATE: 18 BRPM | DIASTOLIC BLOOD PRESSURE: 85 MMHG | HEART RATE: 69 BPM | SYSTOLIC BLOOD PRESSURE: 160 MMHG

## 2023-03-08 DIAGNOSIS — Z90.49 ACQUIRED ABSENCE OF OTHER SPECIFIED PARTS OF DIGESTIVE TRACT: Chronic | ICD-10-CM

## 2023-03-08 DIAGNOSIS — Z98.890 OTHER SPECIFIED POSTPROCEDURAL STATES: Chronic | ICD-10-CM

## 2023-03-08 PROCEDURE — 99284 EMERGENCY DEPT VISIT MOD MDM: CPT

## 2023-03-08 PROCEDURE — 99283 EMERGENCY DEPT VISIT LOW MDM: CPT

## 2023-03-08 RX ORDER — DIPHENHYDRAMINE HCL 50 MG
1 CAPSULE ORAL
Qty: 20 | Refills: 0
Start: 2023-03-08

## 2023-03-08 RX ORDER — DIPHENHYDRAMINE HCL 50 MG
50 CAPSULE ORAL ONCE
Refills: 0 | Status: COMPLETED | OUTPATIENT
Start: 2023-03-08 | End: 2023-03-08

## 2023-03-08 RX ORDER — FAMOTIDINE 10 MG/ML
20 INJECTION INTRAVENOUS ONCE
Refills: 0 | Status: COMPLETED | OUTPATIENT
Start: 2023-03-08 | End: 2023-03-08

## 2023-03-08 RX ORDER — FAMOTIDINE 10 MG/ML
1 INJECTION INTRAVENOUS
Qty: 7 | Refills: 0
Start: 2023-03-08 | End: 2023-03-14

## 2023-03-08 RX ADMIN — FAMOTIDINE 20 MILLIGRAM(S): 10 INJECTION INTRAVENOUS at 22:35

## 2023-03-08 RX ADMIN — Medication 40 MILLIGRAM(S): at 22:36

## 2023-03-08 RX ADMIN — Medication 50 MILLIGRAM(S): at 22:35

## 2023-03-08 NOTE — ED ADULT NURSE NOTE - COVID-19 RESULT DATE/TIME
You have been given emergency department evaluation.  This evaluation is intended to rule out life-threatening conditions.  Is not a complete evaluation.  You could require further testing as determined by your primary care physician or any referred specialist.  Please follow-up with all doctors that you are referred to.  Please be sure to take your prescribed medications and follow any specific instructions in the discharge instructions.  Please follow-up with your primary care physician within 48 hours.  Please have your primary care provider recheck your blood pressure.  Please return to the emergency department if you experience chest pain, shortness of breath, abdominal pain, fever greater than 102, intractable vomiting.  Please return to the emergency department if your symptoms continue or worsen, or if you begin to experience any other concerning symptom.     16-Feb-2023 00:15

## 2023-03-08 NOTE — ED PROVIDER NOTE - NSFOLLOWUPINSTRUCTIONS_ED_ALL_ED_FT
-take benadryl 25-50mg every 6 hrs for itch/rash as needed  -take prednisone and pepcid daily    -see allergist for follow up    General Allergic Reaction    WHAT YOU NEED TO KNOW:    An allergic reaction is your body's response to an allergen. Allergens include medicines, food, insect stings, animal dander, mold, latex, chemicals, and dust mites. Pollen from trees, grass, and weeds can also cause an allergic reaction. An allergic reaction can range from mild to severe.    DISCHARGE INSTRUCTIONS:    Call 911 for signs or symptoms of anaphylaxis, such as trouble breathing, swelling in your mouth or throat, or wheezing. You may also have itching, a rash, hives, or feel like you are going to faint.    Return to the emergency department if:     You have a skin rash, hives, swelling, or itching that is starting to get worse.      Your throat tightens, or your lips or tongue swell.      You have trouble swallowing or speaking.      You have worsening nausea, diarrhea, or abdominal cramps, or you are vomiting.      You have chest pain or tightness.    Contact your healthcare provider if:     You have questions or concerns about your condition or care.        Medicines: You may need any of the following:     Medicines may be given to relieve certain allergy symptoms such as itching, sneezing, and swelling. You may take them as a pill or use drops in your nose or eyes. Topical treatments may be given to put directly on your skin to help decrease itching or swelling.      Epinephrine may be prescribed if you are at risk for anaphylaxis. This is a severe allergic reaction that can be life-threatening. Your healthcare provider will tell you if you need to keep epinephrine with you. You will be taught when and how to use it.      Take your medicine as directed. Contact your healthcare provider if you think your medicine is not helping or if you have side effects. Tell him of her if you are allergic to any medicine. Keep a list of the medicines, vitamins, and herbs you take. Include the amounts, and when and why you take them. Bring the list or the pill bottles to follow-up visits. Carry your medicine list with you in case of an emergency.    Follow up with your healthcare provider as directed: Write down your questions so you remember to ask them during your visits.     Manage your symptoms:     Avoid allergens. You may need to have allergy testing with your healthcare provider or a specialist to find your allergens.      Use cold compresses on your skin or eyes. This will help soothe skin or eyes affected by the allergic reaction. You can make a cold compress by soaking a washcloth in cool water. Wring out the extra water before you apply the washcloth.      Rinse your nasal passages with a saline solution. Daily rinsing may help clear allergens out of your nose. Use distilled water if possible. You can also boil tap water and then let it cool before you use it. Do not use tap water without boiling it first.      Do not smoke. Nicotine and other chemicals in cigarettes and cigars can make an allergic reaction worse, and can also cause lung damage. Ask your healthcare provider for information if you currently smoke and need help to quit. E-cigarettes or smokeless tobacco still contain nicotine. Talk to your healthcare provider before you use these products.

## 2023-03-08 NOTE — ED PROVIDER NOTE - PATIENT PORTAL LINK FT
You can access the FollowMyHealth Patient Portal offered by [facility] by registering at the following website: [email or web address]. By joining [employer]’s FollowMyHealth portal, you will also be able to view your health information using other applications (apps) compatible with our system.

## 2023-03-08 NOTE — ED PROVIDER NOTE - CLINICAL SUMMARY MEDICAL DECISION MAKING FREE TEXT BOX
77-year-old male complaining of itchy rash all over body since 6 PM tonight.  No new exposures except eating canned sardines for lunch today.  No throat or tongue swelling closings or itching.  No trouble breathing.  No nausea vomiting diarrhea or abdominal pain.  No history of allergic reaction    Patient appears in no distress.  Has scattered urticarial lower extremities groin torso.  No signs of anaphylaxis.  Will treat with Benadryl prednisone Pepcid.  Follow-up PMD/allergy

## 2023-03-08 NOTE — ED PROVIDER NOTE - NSFOLLOWUPCLINICS_GEN_ALL_ED_FT
[facility] Allergy and Immunology  Allergy  [address] [place] [address]  Phone: [phone]  Fax:   Follow Up Time: 4-6 Days

## 2023-03-08 NOTE — ED PROVIDER NOTE - OBJECTIVE STATEMENT
77-year-old male complaining of itchy rash all over body since 6 PM tonight.  No new exposures except eating canned sardines for lunch today.  No throat or tongue swelling closings or itching.  No trouble breathing.  No nausea vomiting diarrhea or abdominal pain.  No history of allergic reaction

## 2023-03-08 NOTE — ED ADULT NURSE NOTE - NSICDXPASTMEDICALHX_GEN_ALL_CORE_FT
PAST MEDICAL HISTORY:  Abnormal CT of liver     Acute URI     Bilateral hearing loss     Coronary arteriosclerosis     H/O Clostridium difficile infection     Hip abductor tendinitis     History of abdominal pain     History of acute bronchitis     History of acute sinusitis     History of bronchiectasis     History of contact dermatitis     History of urinary incontinence     Liver cyst     Lung nodule     Malignant neoplasm of head, neck and face     Mass of lateral neck

## 2023-03-08 NOTE — ED PROVIDER NOTE - PHYSICAL EXAMINATION
exam:   General: well appearing, NAD.   HEENT: eyes perrl, nose normal, OP no erythema/exudate/swelling. no tongue swelling  cor: RRR, s1s2, 2+rad pulses.   lungs: ctabl, no resp distress.   abd: soft, ntnd.   neuro: a&ox3, cn2-12 intact, RUTLEDGE, 5/5 strength c nl sensation all extremities, nl coordination.   MSK: no extremity swelling.  Skin: raised pink nontender blanching urticarial rash  bilat groin, scattered over arms, torso.

## 2023-03-13 NOTE — CHART NOTE - NSCHARTNOTEFT_GEN_A_CORE
SW placed call to patient to discuss and assist with follow up care.  Patient presented to ED on 3/8/23 due to rash.  SW unable to reach patient at this time, VM left requesting call back.

## 2023-03-17 ENCOUNTER — APPOINTMENT (OUTPATIENT)
Dept: PULMONOLOGY | Facility: CLINIC | Age: 78
End: 2023-03-17
Payer: MEDICARE

## 2023-03-17 VITALS
HEART RATE: 63 BPM | DIASTOLIC BLOOD PRESSURE: 80 MMHG | BODY MASS INDEX: 21.82 KG/M2 | TEMPERATURE: 97.1 F | SYSTOLIC BLOOD PRESSURE: 130 MMHG | OXYGEN SATURATION: 98 % | WEIGHT: 144 LBS | HEIGHT: 68 IN

## 2023-03-17 DIAGNOSIS — D47.2 MONOCLONAL GAMMOPATHY: ICD-10-CM

## 2023-03-17 DIAGNOSIS — R93.89 ABNORMAL FINDINGS ON DIAGNOSTIC IMAGING OF OTHER SPECIFIED BODY STRUCTURES: ICD-10-CM

## 2023-03-17 PROCEDURE — 99214 OFFICE O/P EST MOD 30 MIN: CPT

## 2023-03-17 NOTE — HISTORY OF PRESENT ILLNESS
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 Final pathology reveals 1 out of 14 lymph nodes positive which was 5.5 cm no extranodal extension all margins were negative with a 2 mm margin. We presented at the multidisciplinary tumor board and also in consultation with Dr. Lovell from [facility] who is his radiation oncologist and we recommended observation given his favorable pathologic findings presented today for follow-up visit and overall is doing well. H  -   father    with  TB  -    patient at [age] years old was sick no treatment -   here  for  cough \par \par -h/o covid  Jan 2021  (vaccinated no )  - \par - in winter cough     weather \par \par 3/17/23\par 78y/o  male  never born in [place]      h/o   squamous  cell carcinoma \par -ct bronchiectasis   QuantiFERON-TB   neg \par -  nebulizer    trial not done \par -doing well  cough resolved

## 2023-03-17 NOTE — ASSESSMENT
[FreeTextEntry1] : 76y/o   male born in [place]\par \par 1-  likely   post -infectious   bronchiectasis / father with h/o TB   (   QuantiFERON-TB   negative ) \par 2-  likely element of asthmatic bronchitis \par 3-    h/o   tonsillar  cancer s/p resection \par 4-   ct   2/23 RML   atelectases mucous    [facility]\par 5-    vaccinations per primary \par \par Recommendations\par 1-   kappa  lamdba  -     hematology / onc follow up discussed    \par 2-   trial nebulizer - normal saline q12  and duonebs q 8 prn \par 3-  f/u ct  June 4months discussed   -  radiologist  - \par \par -  Nemesio  son   [phone]

## 2023-03-17 NOTE — PHYSICAL EXAM
[Enlarged Base of the Tongue] : enlarged base of the tongue [III] : Mallampati Class: III [Supple] : supple [No Neck Mass] : no neck mass [No JVD] : no jvd [Normal S1, S2] : normal s1, s2 [Clear to Auscultation Bilaterally] : clear to auscultation bilaterally [Benign] : benign [Not Tender] : not tender [Soft] : soft [Normal Bowel Sounds] : normal bowel sounds [Normal Gait] : normal gait [No Clubbing] : no clubbing [No Cyanosis] : no cyanosis [Normal Color/ Pigmentation] : normal color/ pigmentation [No Focal Deficits] : no focal deficits [Normal Affect] : normal affect [TextBox_2] : pleasant  male no distress speaking full sentences  no cough

## 2023-04-03 ENCOUNTER — OUTPATIENT (OUTPATIENT)
Dept: OUTPATIENT SERVICES | Facility: HOSPITAL | Age: 78
LOS: 1 days | End: 2023-04-03
Payer: MEDICARE

## 2023-04-03 ENCOUNTER — APPOINTMENT (OUTPATIENT)
Dept: ULTRASOUND IMAGING | Facility: HOSPITAL | Age: 78
End: 2023-04-03
Payer: MEDICARE

## 2023-04-03 DIAGNOSIS — Z98.890 OTHER SPECIFIED POSTPROCEDURAL STATES: Chronic | ICD-10-CM

## 2023-04-03 DIAGNOSIS — C10.9 MALIGNANT NEOPLASM OF OROPHARYNX, UNSPECIFIED: ICD-10-CM

## 2023-04-03 DIAGNOSIS — Z90.49 ACQUIRED ABSENCE OF OTHER SPECIFIED PARTS OF DIGESTIVE TRACT: Chronic | ICD-10-CM

## 2023-04-03 PROCEDURE — 76536 US EXAM OF HEAD AND NECK: CPT | Mod: 26

## 2023-04-03 PROCEDURE — 76536 US EXAM OF HEAD AND NECK: CPT

## 2023-08-28 ENCOUNTER — EMERGENCY (EMERGENCY)
Facility: HOSPITAL | Age: 78
LOS: 1 days | Discharge: HOME | End: 2023-08-28
Attending: EMERGENCY MEDICINE | Admitting: EMERGENCY MEDICINE
Payer: MEDICARE

## 2023-08-28 VITALS
HEIGHT: 62 IN | WEIGHT: 158.07 LBS | OXYGEN SATURATION: 97 % | SYSTOLIC BLOOD PRESSURE: 151 MMHG | HEART RATE: 85 BPM | DIASTOLIC BLOOD PRESSURE: 77 MMHG | RESPIRATION RATE: 19 BRPM | TEMPERATURE: 98 F

## 2023-08-28 VITALS
HEART RATE: 66 BPM | RESPIRATION RATE: 18 BRPM | TEMPERATURE: 98 F | DIASTOLIC BLOOD PRESSURE: 76 MMHG | OXYGEN SATURATION: 95 % | SYSTOLIC BLOOD PRESSURE: 160 MMHG

## 2023-08-28 DIAGNOSIS — Z90.49 ACQUIRED ABSENCE OF OTHER SPECIFIED PARTS OF DIGESTIVE TRACT: Chronic | ICD-10-CM

## 2023-08-28 DIAGNOSIS — Z98.890 OTHER SPECIFIED POSTPROCEDURAL STATES: Chronic | ICD-10-CM

## 2023-08-28 LAB
ALBUMIN SERPL ELPH-MCNC: 3.9 G/DL — SIGNIFICANT CHANGE UP (ref 3.3–5)
ALP SERPL-CCNC: 91 U/L — SIGNIFICANT CHANGE UP (ref 40–120)
ALT FLD-CCNC: 20 U/L — SIGNIFICANT CHANGE UP (ref 10–45)
ANION GAP SERPL CALC-SCNC: 8 MMOL/L — SIGNIFICANT CHANGE UP (ref 5–17)
APTT BLD: 29.8 SEC — SIGNIFICANT CHANGE UP (ref 24.5–35.6)
AST SERPL-CCNC: 17 U/L — SIGNIFICANT CHANGE UP (ref 10–40)
BASOPHILS # BLD AUTO: 0.07 K/UL — SIGNIFICANT CHANGE UP (ref 0–0.2)
BASOPHILS NFR BLD AUTO: 0.9 % — SIGNIFICANT CHANGE UP (ref 0–2)
BILIRUB SERPL-MCNC: 0.3 MG/DL — SIGNIFICANT CHANGE UP (ref 0.2–1.2)
BUN SERPL-MCNC: 16 MG/DL — SIGNIFICANT CHANGE UP (ref 7–23)
CALCIUM SERPL-MCNC: 9.8 MG/DL — SIGNIFICANT CHANGE UP (ref 8.4–10.5)
CHLORIDE SERPL-SCNC: 101 MMOL/L — SIGNIFICANT CHANGE UP (ref 96–108)
CO2 SERPL-SCNC: 30 MMOL/L — SIGNIFICANT CHANGE UP (ref 22–31)
CREAT SERPL-MCNC: 1.08 MG/DL — SIGNIFICANT CHANGE UP (ref 0.5–1.3)
EGFR: 71 ML/MIN/1.73M2 — SIGNIFICANT CHANGE UP
EOSINOPHIL # BLD AUTO: 0.15 K/UL — SIGNIFICANT CHANGE UP (ref 0–0.5)
EOSINOPHIL NFR BLD AUTO: 1.8 % — SIGNIFICANT CHANGE UP (ref 0–6)
GLUCOSE SERPL-MCNC: 110 MG/DL — HIGH (ref 70–99)
HCT VFR BLD CALC: 44.9 % — SIGNIFICANT CHANGE UP (ref 39–50)
HGB BLD-MCNC: 14.9 G/DL — SIGNIFICANT CHANGE UP (ref 13–17)
IMM GRANULOCYTES NFR BLD AUTO: 0.1 % — SIGNIFICANT CHANGE UP (ref 0–0.9)
INR BLD: 1.08 RATIO — SIGNIFICANT CHANGE UP (ref 0.85–1.18)
LYMPHOCYTES # BLD AUTO: 2.15 K/UL — SIGNIFICANT CHANGE UP (ref 1–3.3)
LYMPHOCYTES # BLD AUTO: 26.4 % — SIGNIFICANT CHANGE UP (ref 13–44)
MCHC RBC-ENTMCNC: 32 PG — SIGNIFICANT CHANGE UP (ref 27–34)
MCHC RBC-ENTMCNC: 33.2 GM/DL — SIGNIFICANT CHANGE UP (ref 32–36)
MCV RBC AUTO: 96.4 FL — SIGNIFICANT CHANGE UP (ref 80–100)
MONOCYTES # BLD AUTO: 0.67 K/UL — SIGNIFICANT CHANGE UP (ref 0–0.9)
MONOCYTES NFR BLD AUTO: 8.2 % — SIGNIFICANT CHANGE UP (ref 2–14)
NEUTROPHILS # BLD AUTO: 5.1 K/UL — SIGNIFICANT CHANGE UP (ref 1.8–7.4)
NEUTROPHILS NFR BLD AUTO: 62.6 % — SIGNIFICANT CHANGE UP (ref 43–77)
NRBC # BLD: 0 /100 WBCS — SIGNIFICANT CHANGE UP (ref 0–0)
PLATELET # BLD AUTO: 228 K/UL — SIGNIFICANT CHANGE UP (ref 150–400)
POTASSIUM SERPL-MCNC: 4.5 MMOL/L — SIGNIFICANT CHANGE UP (ref 3.5–5.3)
POTASSIUM SERPL-SCNC: 4.5 MMOL/L — SIGNIFICANT CHANGE UP (ref 3.5–5.3)
PROT SERPL-MCNC: 7.9 G/DL — SIGNIFICANT CHANGE UP (ref 6–8.3)
PROTHROM AB SERPL-ACNC: 12.3 SEC — SIGNIFICANT CHANGE UP (ref 9.5–13)
RBC # BLD: 4.66 M/UL — SIGNIFICANT CHANGE UP (ref 4.2–5.8)
RBC # FLD: 12.7 % — SIGNIFICANT CHANGE UP (ref 10.3–14.5)
SODIUM SERPL-SCNC: 139 MMOL/L — SIGNIFICANT CHANGE UP (ref 135–145)
WBC # BLD: 8.15 K/UL — SIGNIFICANT CHANGE UP (ref 3.8–10.5)
WBC # FLD AUTO: 8.15 K/UL — SIGNIFICANT CHANGE UP (ref 3.8–10.5)

## 2023-08-28 PROCEDURE — 80053 COMPREHEN METABOLIC PANEL: CPT

## 2023-08-28 PROCEDURE — 70498 CT ANGIOGRAPHY NECK: CPT | Mod: MG

## 2023-08-28 PROCEDURE — 93005 ELECTROCARDIOGRAM TRACING: CPT

## 2023-08-28 PROCEDURE — 70450 CT HEAD/BRAIN W/O DYE: CPT | Mod: MG

## 2023-08-28 PROCEDURE — 85610 PROTHROMBIN TIME: CPT

## 2023-08-28 PROCEDURE — 85730 THROMBOPLASTIN TIME PARTIAL: CPT

## 2023-08-28 PROCEDURE — 70496 CT ANGIOGRAPHY HEAD: CPT | Mod: 26,MG

## 2023-08-28 PROCEDURE — 85025 COMPLETE CBC W/AUTO DIFF WBC: CPT

## 2023-08-28 PROCEDURE — 70498 CT ANGIOGRAPHY NECK: CPT | Mod: 26,MG

## 2023-08-28 PROCEDURE — 99285 EMERGENCY DEPT VISIT HI MDM: CPT | Mod: 25

## 2023-08-28 PROCEDURE — 93010 ELECTROCARDIOGRAM REPORT: CPT

## 2023-08-28 PROCEDURE — 99285 EMERGENCY DEPT VISIT HI MDM: CPT

## 2023-08-28 PROCEDURE — G1004: CPT

## 2023-08-28 PROCEDURE — 70450 CT HEAD/BRAIN W/O DYE: CPT | Mod: 26,MG,XU

## 2023-08-28 PROCEDURE — 36415 COLL VENOUS BLD VENIPUNCTURE: CPT

## 2023-08-28 PROCEDURE — 70496 CT ANGIOGRAPHY HEAD: CPT | Mod: MG

## 2023-08-28 NOTE — ED ADULT NURSE NOTE - NSFALLRISK_ED_ALL_ED
[FreeTextEntry1] : 57 y/o female patient presents today for a follow up. Today she states that the frequency of her HAs has decreased while on BOTOX and Aimovig. No AE. No new medical problems. QOL good Back pain stable
No

## 2023-08-28 NOTE — ED PROVIDER NOTE - PATIENT PORTAL LINK FT
You can access the FollowMyHealth Patient Portal offered by [facility] by registering at the following website: [email or web address]. By joining [facility]’s FollowMyHealth portal, you will also be able to view your health information using other applications (apps) compatible with our system.

## 2023-08-28 NOTE — ED PROVIDER NOTE - PHYSICAL EXAMINATION
Gen: NAD, AOx3, able to make needs known, non-toxic  Head: NCAT  HEENT: pupils equal and reactive to light, EOMI, oral mucosa moist, normal conjunctiva. Visual acuity OD 20/40, OS 20/80 with glasses on.   Lung: CTAB, no respiratory distress, no wheezes/rhonchi/rales B/L, speaking in full sentences  CV: RRR, no M/R/G, pulses bilaterally   Abd: soft, NTND, no guarding, no CVA tenderness  MSK: no visible bony deformities  Neuro: No focal sensory or motor deficits  Skin: Warm, well perfused, no rash  Psych: normal affect

## 2023-08-28 NOTE — ED PROVIDER NOTE - ATTENDING CONTRIBUTION TO CARE
78-year-old male with history of oral cancer treated with resection, history of retinal detachment, complaining of blurry vision in left eye over the last 2 weeks.  He complains of blurry vision with seeing wavy lines.  He had 3 of these episodes in the last 2 weeks each lasting about 20 to 30 minutes.  Latest episode was this morning.  Currently symptoms resolved.  Patient occasionally complaining of some pressure in the left temple area to his left jaw.  No fever or chills.  No eye trauma.  Patient evaluated by his ophthalmologist Dr. Sanjay kramer who did not find any eye abnormalities.  Patient sent to the ED by his PMD for CT head and further evaluation.  Patient denies any focal weakness numbness or speech change.  No chest pain shortness of breath fever chills    exam:   General: well appearing, NAD.   HEENT: eyes perrl, nose normal, OP no erythema/exudate/swelling.   cor: RRR, s1s2, 2+rad pulses.   lungs: ctabl, no resp distress.   abd: soft, ntnd.   neuro: a&ox3, cn2-12 intact, RUTLEDGE, 5/5 strength c nl sensation all extremities, nl coordination. visual fields full bilat. NIHSS=0  MSK: no extremity swelling.  Skin: normal, no rash    AP: Patient with episodes of blurry vision from left eye for the last 2 weeks, currently asymptomatic with nonfocal neuro exam and no visual deficits currently.  Will check CT head CT angio head and neck, labs, rule out stroke, mass.

## 2023-08-28 NOTE — ED PROVIDER NOTE - CARE PROVIDER_API CALL
Leydi Bone  Neurology  [address], [address]  [place] [address]  Phone: [phone]  Fax: [phone]  Follow Up Time: Urgent    Caitie Unger  [facility] Medicine  [address] [place] [id number]  Phone: [phone]  Fax: [phone]  Follow Up Time: Urgent

## 2023-08-28 NOTE — ED PROVIDER NOTE - PROVIDER TOKENS
PROVIDER:[TOKEN:[[id number]],FOLLOWUP:[Urgent]],PROVIDER:[TOKEN:[[id number]],FOLLOWUP:[Urgent]]

## 2023-08-28 NOTE — ED ADULT TRIAGE NOTE - CHIEF COMPLAINT QUOTE
Blurred vision in left eye 3 times in 2 weeks, pt reports pressure in left orbital region. sent in by PCP for MRI/CT scan

## 2023-08-28 NOTE — ED PROVIDER NOTE - IV ALTEPLASE EXCL REL HIDDEN
Patient last seen 12/16/2019 and no refills needed of minocycline at that time.  Next scheduled appt 06/17/20   show right

## 2023-08-28 NOTE — ED PROVIDER NOTE - NSFOLLOWUPINSTRUCTIONS_ED_ALL_ED_FT
Follow-up with your primary care doctor and also with neurologist in the next 1 to 2 days  -Return for worse vision, weakness numbness, speech or vision changes or other concerns

## 2023-08-28 NOTE — ED ADULT NURSE NOTE - OBJECTIVE STATEMENT
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 Patient states that he has a history of a retinal detachment in that eye when he was younger, at baseline left eye is worse than the right eye.  At this time has no blurriness.  Patient states that the episodes of blurry vision last for about 20 minutes at a time.  Also has pain along the left temporal region with these episodes.  Went to see his ophthalmologist Dr. Vega today, they did a full ophthalmologic work-up with no diagnosis.  Was referred to his PCP at that time, Dr. Unger referred to the emergency room for CAT scan.  Denies fevers, chills, nausea, vomiting, chest pain, SOB.  Denies trauma to the eyes

## 2023-08-28 NOTE — ED PROVIDER NOTE - OBJECTIVE STATEMENT
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 Patient states that he has a history of a retinal detachment in that eye when he was younger, at baseline left eye is worse than the right eye.  At this time has no blurriness.  Patient states that the episodes of blurry vision last for about 20 minutes at a time.  Also has pain along the left temporal region with these episodes.  Went to see his ophthalmologist Dr. Vega today, they did a full ophthalmologic work-up with no diagnosis.  Was referred to his PCP at that time, Dr. Unger referred to the emergency room for CAT scan.  Denies fevers, chills, nausea, vomiting, chest pain, SOB.  Denies trauma to the eyes.

## 2023-08-28 NOTE — ED PROVIDER NOTE - CLINICAL SUMMARY MEDICAL DECISION MAKING FREE TEXT BOX
Doug, PGY3 - 78-year-old man with history of retinal detachment of the left eye presenting due to intermittent blurriness over the last week, ophthalmological exam normal. visual acuity at baseline. Consider TIA versus intracranial mass. Labs, CTAs to further evaluate atherosclerosis and narrowing of vessels. reassess. *The above represents an initial assessment/impression. Please refer to progress notes for potential changes in patient clinical course* Doug, PGY3 - 78-year-old man with history of retinal detachment of the left eye presenting due to intermittent blurriness over the last week, ophthalmological exam normal. visual acuity at baseline. Consider TIA versus intracranial mass. Labs, CTAs to further evaluate atherosclerosis and narrowing of vessels. reassess. *The above represents an initial assessment/impression. Please refer to progress notes for potential changes in patient clinical course*    dr bean: Labs unremarkable.  CT head, CT angio head and neck unremarkable, no signs of stroke.  Patient remains asymptomatic currently with nonfocal neuro exam, no visual deficits.  Offered patient admission for further evaluation/work-up of possible TIA.  Patient declining, wishes to follow-up outpatient.  Recommend patient follow-up with neuro ASAP, return to ER for any worsening or new symptoms

## 2023-08-31 NOTE — CHART NOTE - NSCHARTNOTEFT_GEN_A_CORE
SW placed call to patient to discuss and assist with follow up care.  Patient presented to ED on 8/28/23 due to eye vision change.  SW unable to reach patient at this time.

## 2023-09-14 ENCOUNTER — APPOINTMENT (OUTPATIENT)
Dept: FAMILY MEDICINE | Facility: CLINIC | Age: 78
End: 2023-09-14
Payer: MEDICARE

## 2023-09-14 VITALS
DIASTOLIC BLOOD PRESSURE: 68 MMHG | TEMPERATURE: 98 F | RESPIRATION RATE: 16 BRPM | WEIGHT: 158 LBS | SYSTOLIC BLOOD PRESSURE: 131 MMHG | HEIGHT: 68 IN | OXYGEN SATURATION: 98 % | BODY MASS INDEX: 23.95 KG/M2 | HEART RATE: 78 BPM

## 2023-09-14 DIAGNOSIS — K40.21 BILATERAL INGUINAL HERNIA, W/OUT OBSTRUCTION OR GANGRENE, RECURRENT: ICD-10-CM

## 2023-09-14 DIAGNOSIS — H53.9 UNSPECIFIED VISUAL DISTURBANCE: ICD-10-CM

## 2023-09-14 PROCEDURE — 99215 OFFICE O/P EST HI 40 MIN: CPT

## 2023-09-22 ENCOUNTER — APPOINTMENT (OUTPATIENT)
Dept: SURGERY | Facility: CLINIC | Age: 78
End: 2023-09-22
Payer: MEDICARE

## 2023-09-22 ENCOUNTER — RESULT REVIEW (OUTPATIENT)
Age: 78
End: 2023-09-22

## 2023-09-22 VITALS
BODY MASS INDEX: 22.28 KG/M2 | HEART RATE: 65 BPM | RESPIRATION RATE: 16 BRPM | OXYGEN SATURATION: 98 % | HEIGHT: 68 IN | SYSTOLIC BLOOD PRESSURE: 124 MMHG | TEMPERATURE: 97.5 F | DIASTOLIC BLOOD PRESSURE: 70 MMHG | WEIGHT: 147 LBS

## 2023-09-22 DIAGNOSIS — R10.32 LEFT LOWER QUADRANT PAIN: ICD-10-CM

## 2023-09-22 DIAGNOSIS — M76.892 OTHER SPECIFIED ENTHESOPATHIES OF LEFT LOWER LIMB, EXCLUDING FOOT: ICD-10-CM

## 2023-09-22 PROCEDURE — 99204 OFFICE O/P NEW MOD 45 MIN: CPT

## 2023-09-26 ENCOUNTER — APPOINTMENT (OUTPATIENT)
Dept: MRI IMAGING | Facility: HOSPITAL | Age: 78
End: 2023-09-26

## 2023-09-26 ENCOUNTER — OUTPATIENT (OUTPATIENT)
Dept: OUTPATIENT SERVICES | Facility: HOSPITAL | Age: 78
LOS: 1 days | End: 2023-09-26
Payer: MEDICARE

## 2023-09-26 DIAGNOSIS — Z90.49 ACQUIRED ABSENCE OF OTHER SPECIFIED PARTS OF DIGESTIVE TRACT: Chronic | ICD-10-CM

## 2023-09-26 DIAGNOSIS — Z98.890 OTHER SPECIFIED POSTPROCEDURAL STATES: Chronic | ICD-10-CM

## 2023-09-26 DIAGNOSIS — M76.892 OTHER SPECIFIED ENTHESOPATHIES OF LEFT LOWER LIMB, EXCLUDING FOOT: ICD-10-CM

## 2023-09-26 PROCEDURE — 72195 MRI PELVIS W/O DYE: CPT | Mod: 26,MH

## 2023-09-26 PROCEDURE — 72195 MRI PELVIS W/O DYE: CPT

## 2023-09-27 ENCOUNTER — APPOINTMENT (OUTPATIENT)
Dept: MRI IMAGING | Facility: HOSPITAL | Age: 78
End: 2023-09-27

## 2023-09-28 DIAGNOSIS — M16.9 OSTEOARTHRITIS OF HIP, UNSPECIFIED: ICD-10-CM

## 2023-10-04 ENCOUNTER — NON-APPOINTMENT (OUTPATIENT)
Age: 78
End: 2023-10-04

## 2023-10-04 ENCOUNTER — APPOINTMENT (OUTPATIENT)
Dept: OPHTHALMOLOGY | Facility: CLINIC | Age: 78
End: 2023-10-04
Payer: MEDICARE

## 2023-10-04 PROCEDURE — 92083 EXTENDED VISUAL FIELD XM: CPT

## 2023-10-04 PROCEDURE — 92133 CPTRZD OPH DX IMG PST SGM ON: CPT

## 2023-10-04 PROCEDURE — 99204 OFFICE O/P NEW MOD 45 MIN: CPT

## 2023-10-17 ENCOUNTER — OUTPATIENT (OUTPATIENT)
Dept: OUTPATIENT SERVICES | Facility: HOSPITAL | Age: 78
LOS: 1 days | End: 2023-10-17
Payer: MEDICARE

## 2023-10-17 ENCOUNTER — APPOINTMENT (OUTPATIENT)
Dept: ULTRASOUND IMAGING | Facility: HOSPITAL | Age: 78
End: 2023-10-17
Payer: MEDICARE

## 2023-10-17 DIAGNOSIS — Z98.890 OTHER SPECIFIED POSTPROCEDURAL STATES: Chronic | ICD-10-CM

## 2023-10-17 DIAGNOSIS — Z90.49 ACQUIRED ABSENCE OF OTHER SPECIFIED PARTS OF DIGESTIVE TRACT: Chronic | ICD-10-CM

## 2023-10-17 DIAGNOSIS — E04.1 NONTOXIC SINGLE THYROID NODULE: ICD-10-CM

## 2023-10-17 PROCEDURE — 76536 US EXAM OF HEAD AND NECK: CPT

## 2023-10-17 PROCEDURE — 76536 US EXAM OF HEAD AND NECK: CPT | Mod: 26

## 2024-02-06 ENCOUNTER — APPOINTMENT (OUTPATIENT)
Dept: FAMILY MEDICINE | Facility: CLINIC | Age: 79
End: 2024-02-06
Payer: MEDICARE

## 2024-02-06 ENCOUNTER — NON-APPOINTMENT (OUTPATIENT)
Age: 79
End: 2024-02-06

## 2024-02-06 VITALS
RESPIRATION RATE: 15 BRPM | HEIGHT: 68 IN | SYSTOLIC BLOOD PRESSURE: 138 MMHG | HEART RATE: 67 BPM | BODY MASS INDEX: 23.64 KG/M2 | DIASTOLIC BLOOD PRESSURE: 76 MMHG | OXYGEN SATURATION: 97 % | TEMPERATURE: 97.3 F | WEIGHT: 156 LBS

## 2024-02-06 DIAGNOSIS — E04.1 NONTOXIC SINGLE THYROID NODULE: ICD-10-CM

## 2024-02-06 DIAGNOSIS — J47.0 BRONCHIECTASIS WITH ACUTE LOWER RESPIRATORY INFECTION: ICD-10-CM

## 2024-02-06 DIAGNOSIS — Z00.00 ENCOUNTER FOR GENERAL ADULT MEDICAL EXAMINATION W/OUT ABNORMAL FINDINGS: ICD-10-CM

## 2024-02-06 DIAGNOSIS — R00.2 PALPITATIONS: ICD-10-CM

## 2024-02-06 PROCEDURE — G0439: CPT

## 2024-02-06 PROCEDURE — 93000 ELECTROCARDIOGRAM COMPLETE: CPT

## 2024-02-06 RX ORDER — IPRATROPIUM BROMIDE AND ALBUTEROL SULFATE 2.5; .5 MG/3ML; MG/3ML
0.5-2.5 (3) SOLUTION RESPIRATORY (INHALATION)
Qty: 1620 | Refills: 3 | Status: COMPLETED | COMMUNITY
Start: 2023-03-03 | End: 2024-02-06

## 2024-02-06 NOTE — REVIEW OF SYSTEMS
[Palpitations] : palpitations [Frequency] : frequency [Negative] : Psychiatric [Chest Pain] : no chest pain [Claudication] : no  leg claudication [Lower Ext Edema] : no lower extremity edema [Orthopena] : no orthopnea [Paroxysmal Nocturnal Dyspnea] : no paroxysmal nocturnal dyspnea [Dysuria] : no dysuria [Incontinence] : no incontinence [Hesitancy] : no hesitancy [Nocturia] : no nocturia [Hematuria] : no hematuria [Impotence] : no impotency [Poor Libido] : libido not poor

## 2024-02-06 NOTE — PHYSICAL EXAM
[Well Nourished] : well nourished [Well Developed] : well developed [Well-Appearing] : well-appearing [Normal Sclera/Conjunctiva] : normal sclera/conjunctiva [PERRL] : pupils equal round and reactive to light [EOMI] : extraocular movements intact [Normal Outer Ear/Nose] : the outer ears and nose were normal in appearance [Normal Oropharynx] : the oropharynx was normal [No JVD] : no jugular venous distention [No Lymphadenopathy] : no lymphadenopathy [Supple] : supple [No Respiratory Distress] : no respiratory distress  [No Accessory Muscle Use] : no accessory muscle use [Clear to Auscultation] : lungs were clear to auscultation bilaterally [Normal Rate] : normal rate  [Regular Rhythm] : with a regular rhythm [Normal S1, S2] : normal S1 and S2 [No Murmur] : no murmur heard [No Varicosities] : no varicosities [No Edema] : there was no peripheral edema [Soft] : abdomen soft [Non Tender] : non-tender [Non-distended] : non-distended [No Masses] : no abdominal mass palpated [No HSM] : no HSM [Normal Bowel Sounds] : normal bowel sounds [Declined Rectal Exam] : declined rectal exam [Normal Posterior Cervical Nodes] : no posterior cervical lymphadenopathy [Normal Anterior Cervical Nodes] : no anterior cervical lymphadenopathy [No CVA Tenderness] : no CVA  tenderness [No Spinal Tenderness] : no spinal tenderness [No Joint Swelling] : no joint swelling [Grossly Normal Strength/Tone] : grossly normal strength/tone [No Rash] : no rash [Coordination Grossly Intact] : coordination grossly intact [No Focal Deficits] : no focal deficits [Normal Gait] : normal gait [Deep Tendon Reflexes (DTR)] : deep tendon reflexes were 2+ and symmetric [Alert and Oriented x3] : oriented to person, place, and time [Normal] : affect was normal and insight and judgment were intact [de-identified] : declined - done by LEATHA

## 2024-02-06 NOTE — HISTORY OF PRESENT ILLNESS
[FreeTextEntry1] : cc: wellness visit  [de-identified] : Patient presented wellness visit. He is  CP,SOB,Abd pain, no N,V,C,D. Pt c/o heart palpitation in AM and before going to bed.  Pt with ENT regularly> Pt with chronic lung changes, aware that he is overdue for f/u Asx.

## 2024-02-06 NOTE — HEALTH RISK ASSESSMENT
[Very Good] : ~his/her~  mood as very good [Yes] : Yes [Monthly or less (1 pt)] : Monthly or less (1 point) [1 or 2 (0 pts)] : 1 or 2 (0 points) [Never (0 pts)] : Never (0 points) [No] : In the past 12 months have you used drugs other than those required for medical reasons? No [No falls in past year] : Patient reported no falls in the past year [0] : 2) Feeling down, depressed, or hopeless: Not at all (0) [PHQ-2 Negative - No further assessment needed] : PHQ-2 Negative - No further assessment needed [Patient reported colonoscopy was normal] : Patient reported colonoscopy was normal [None] : None [With Significant Other] : lives with significant other [With Family] : lives with family [Employed] : employed [College] : College [] :  [# Of Children ___] : has [unfilled] children [Feels Safe at Home] : Feels safe at home [Independent] : managing finances [Carbon Monoxide Detector] : carbon monoxide detector [Seat Belt] :  uses seat belt [Sunscreen] : uses sunscreen [With Patient/Caregiver] : , with patient/caregiver [Aggressive treatment] : aggressive treatment [Never] : Never [FreeTextEntry1] : heart palpitation  palpitation  [de-identified] : No [de-identified] : , ENT,Cardiol  [Audit-CScore] : 1 [de-identified] : walking , exercise  [de-identified] : healthy  [REW5Podty] : 0 [Change in mental status noted] : No change in mental status noted [Language] : denies difficulty with language [Reports changes in hearing] : Reports no changes in hearing [Reports changes in vision] : Reports no changes in vision [Reports changes in dental health] : Reports no changes in dental health [Smoke Detector] : no smoke detector [MammogramDate] : NA [PapSmearDate] : NA [BoneDensityDate] : never [ColonoscopyDate] : 12/20 [HIVDate] : 08/18 [HepatitisCDate] : 12/18 [AdvancecareDate] : 01/24

## 2024-02-09 DIAGNOSIS — N32.81 OVERACTIVE BLADDER: ICD-10-CM

## 2024-02-09 LAB
ALBUMIN SERPL ELPH-MCNC: 4.7 G/DL
ALP BLD-CCNC: 86 U/L
ALT SERPL-CCNC: 11 U/L
AMYLASE/CREAT SERPL: 67 U/L
ANION GAP SERPL CALC-SCNC: 10 MMOL/L
APPEARANCE: CLEAR
AST SERPL-CCNC: 15 U/L
BASOPHILS # BLD AUTO: 0.03 K/UL
BASOPHILS NFR BLD AUTO: 0.4 %
BILIRUB SERPL-MCNC: 0.8 MG/DL
BILIRUBIN URINE: NEGATIVE
BLOOD URINE: NEGATIVE
BUN SERPL-MCNC: 19 MG/DL
CALCIUM SERPL-MCNC: 10 MG/DL
CHLORIDE SERPL-SCNC: 105 MMOL/L
CHOLEST SERPL-MCNC: 149 MG/DL
CO2 SERPL-SCNC: 29 MMOL/L
COLOR: YELLOW
CREAT SERPL-MCNC: 1.03 MG/DL
EGFR: 75 ML/MIN/1.73M2
EOSINOPHIL # BLD AUTO: 0.14 K/UL
EOSINOPHIL NFR BLD AUTO: 2 %
GLUCOSE QUALITATIVE U: NEGATIVE MG/DL
GLUCOSE SERPL-MCNC: 105 MG/DL
HCT VFR BLD CALC: 46.3 %
HDLC SERPL-MCNC: 53 MG/DL
HGB BLD-MCNC: 15.3 G/DL
IMM GRANULOCYTES NFR BLD AUTO: 0.3 %
KETONES URINE: NEGATIVE MG/DL
LDLC SERPL CALC-MCNC: 80 MG/DL
LEUKOCYTE ESTERASE URINE: NEGATIVE
LPL SERPL-CCNC: 38 U/L
LYMPHOCYTES # BLD AUTO: 1.69 K/UL
LYMPHOCYTES NFR BLD AUTO: 24.5 %
MAN DIFF?: NORMAL
MCHC RBC-ENTMCNC: 31.5 PG
MCHC RBC-ENTMCNC: 33 GM/DL
MCV RBC AUTO: 95.3 FL
MONOCYTES # BLD AUTO: 0.62 K/UL
MONOCYTES NFR BLD AUTO: 9 %
NEUTROPHILS # BLD AUTO: 4.39 K/UL
NEUTROPHILS NFR BLD AUTO: 63.8 %
NITRITE URINE: NEGATIVE
NONHDLC SERPL-MCNC: 97 MG/DL
PH URINE: 6.5
PLATELET # BLD AUTO: 244 K/UL
POTASSIUM SERPL-SCNC: 4.9 MMOL/L
PROT SERPL-MCNC: 7.3 G/DL
PROTEIN URINE: NEGATIVE MG/DL
PSA SERPL-MCNC: 2.86 NG/ML
RBC # BLD: 4.86 M/UL
RBC # FLD: 12.5 %
SODIUM SERPL-SCNC: 144 MMOL/L
SPECIFIC GRAVITY URINE: 1.02
TRIGL SERPL-MCNC: 91 MG/DL
TSH SERPL-ACNC: 2.07 UIU/ML
UROBILINOGEN URINE: 0.2 MG/DL
WBC # FLD AUTO: 6.89 K/UL

## 2024-02-14 ENCOUNTER — APPOINTMENT (OUTPATIENT)
Dept: FAMILY MEDICINE | Facility: CLINIC | Age: 79
End: 2024-02-14
Payer: MEDICARE

## 2024-02-14 VITALS
DIASTOLIC BLOOD PRESSURE: 74 MMHG | HEART RATE: 83 BPM | SYSTOLIC BLOOD PRESSURE: 142 MMHG | TEMPERATURE: 99 F | OXYGEN SATURATION: 94 % | RESPIRATION RATE: 16 BRPM | BODY MASS INDEX: 23.79 KG/M2 | WEIGHT: 157 LBS | HEIGHT: 68 IN

## 2024-02-14 DIAGNOSIS — J06.9 ACUTE UPPER RESPIRATORY INFECTION, UNSPECIFIED: ICD-10-CM

## 2024-02-14 PROCEDURE — 99214 OFFICE O/P EST MOD 30 MIN: CPT

## 2024-02-14 PROCEDURE — G2211 COMPLEX E/M VISIT ADD ON: CPT

## 2024-02-15 ENCOUNTER — APPOINTMENT (OUTPATIENT)
Dept: CARDIOLOGY | Facility: CLINIC | Age: 79
End: 2024-02-15

## 2024-02-15 PROBLEM — J06.9 ACUTE URI: Status: ACTIVE | Noted: 2018-12-06

## 2024-02-15 LAB
INFLUENZA A RESULT: NOT DETECTED
INFLUENZA B RESULT: NOT DETECTED
RESP SYN VIRUS RESULT: NOT DETECTED
SARS-COV-2 RESULT: DETECTED

## 2024-02-15 RX ORDER — NIRMATRELVIR AND RITONAVIR 300-100 MG
20 X 150 MG & KIT ORAL
Qty: 1 | Refills: 0 | Status: ACTIVE | COMMUNITY
Start: 2024-02-15 | End: 1900-01-01

## 2024-02-15 NOTE — REVIEW OF SYSTEMS
[Earache] : no earache [Hearing Loss] : no hearing loss [Nosebleeds] : no nosebleeds [Postnasal Drip] : postnasal drip [Nasal Discharge] : nasal discharge [Sore Throat] : no sore throat [Hoarseness] : hoarseness [Chest Pain] : no chest pain [Orthopena] : no orthopnea [Shortness Of Breath] : no shortness of breath [Wheezing] : no wheezing [Cough] : cough [Dyspnea on Exertion] : not dyspnea on exertion [Negative] : Psychiatric

## 2024-02-15 NOTE — PHYSICAL EXAM
[No Acute Distress] : no acute distress [Normal Sclera/Conjunctiva] : normal sclera/conjunctiva [Normal Outer Ear/Nose] : the outer ears and nose were normal in appearance [Normal TMs] : both tympanic membranes were normal [No JVD] : no jugular venous distention [Normal] : no rash [de-identified] : + pharynx erythema

## 2024-02-15 NOTE — HISTORY OF PRESENT ILLNESS
[FreeTextEntry8] : cc: congestion Pt presented for c/o head congestion 2 days,no fever, no chills,no sick contact. He denies CP,SOB,+ dry cough.Pt took OTC meds min. improvement

## 2024-04-08 ENCOUNTER — APPOINTMENT (OUTPATIENT)
Dept: FAMILY MEDICINE | Facility: CLINIC | Age: 79
End: 2024-04-08
Payer: MEDICARE

## 2024-04-08 VITALS
HEIGHT: 68 IN | SYSTOLIC BLOOD PRESSURE: 142 MMHG | TEMPERATURE: 97.5 F | OXYGEN SATURATION: 99 % | RESPIRATION RATE: 17 BRPM | BODY MASS INDEX: 24.71 KG/M2 | WEIGHT: 163 LBS | HEART RATE: 78 BPM | DIASTOLIC BLOOD PRESSURE: 70 MMHG

## 2024-04-08 DIAGNOSIS — I25.10 ATHEROSCLEROTIC HEART DISEASE OF NATIVE CORONARY ARTERY W/OUT ANGINA PECTORIS: ICD-10-CM

## 2024-04-08 DIAGNOSIS — J47.9 BRONCHIECTASIS, UNCOMPLICATED: ICD-10-CM

## 2024-04-08 DIAGNOSIS — C10.9 MALIGNANT NEOPLASM OF OROPHARYNX, UNSPECIFIED: ICD-10-CM

## 2024-04-08 DIAGNOSIS — M54.2 CERVICALGIA: ICD-10-CM

## 2024-04-08 DIAGNOSIS — G89.29 CERVICALGIA: ICD-10-CM

## 2024-04-08 PROCEDURE — 99214 OFFICE O/P EST MOD 30 MIN: CPT

## 2024-04-08 PROCEDURE — G2211 COMPLEX E/M VISIT ADD ON: CPT

## 2024-04-08 RX ORDER — PREDNISONE 20 MG/1
20 TABLET ORAL
Qty: 5 | Refills: 0 | Status: COMPLETED | COMMUNITY
Start: 2018-12-06 | End: 2024-04-08

## 2024-04-08 RX ORDER — AMOXICILLIN AND CLAVULANATE POTASSIUM 875; 125 MG/1; MG/1
875-125 TABLET, COATED ORAL
Qty: 20 | Refills: 0 | Status: COMPLETED | COMMUNITY
Start: 2022-02-15 | End: 2024-04-08

## 2024-04-08 RX ORDER — BENZONATATE 200 MG/1
200 CAPSULE ORAL 3 TIMES DAILY
Qty: 60 | Refills: 0 | Status: COMPLETED | COMMUNITY
Start: 2019-08-07 | End: 2024-04-08

## 2024-04-09 PROBLEM — J47.9 BRONCHIECTASIS WITHOUT COMPLICATION: Status: ACTIVE | Noted: 2020-02-20

## 2024-04-09 PROBLEM — I25.10 CORONARY ARTERIOSCLEROSIS: Status: ACTIVE | Noted: 2020-01-30

## 2024-04-09 PROBLEM — C10.9 OROPHARYNGEAL CANCER: Status: ACTIVE | Noted: 2023-01-16

## 2024-04-09 PROBLEM — M54.2 CHRONIC NECK PAIN: Status: ACTIVE | Noted: 2024-02-09

## 2024-04-09 NOTE — HISTORY OF PRESENT ILLNESS
[FreeTextEntry8] : cc: neck pain patient c/o right sided neck pain , 1 weeks since his son gave to him - neck massage, Pt with Hx of neck tumor, s/o resection, He has not f/u with ENT for the past 2 years ,despite of recommended to the pt, He denies fever no chills, no weigh t loss, no swallowing problems. Pt is pain free if he dose not put pressure to that are. He denies fever, no chills, no mass

## 2024-04-09 NOTE — PHYSICAL EXAM
[No Acute Distress] : no acute distress [Normal Outer Ear/Nose] : the outer ears and nose were normal in appearance [Normal TMs] : both tympanic membranes were normal [No Edema] : there was no peripheral edema [Normal] : normal gait, coordination grossly intact, no focal deficits and deep tendon reflexes were 2+ and symmetric [de-identified] : right side of the neck spot tenderness, - no mass, no redness - in the operating area

## 2024-04-10 ENCOUNTER — NON-APPOINTMENT (OUTPATIENT)
Age: 79
End: 2024-04-10

## 2024-04-23 ENCOUNTER — APPOINTMENT (OUTPATIENT)
Dept: ULTRASOUND IMAGING | Facility: HOSPITAL | Age: 79
End: 2024-04-23
Payer: MEDICARE

## 2024-04-23 ENCOUNTER — OUTPATIENT (OUTPATIENT)
Dept: OUTPATIENT SERVICES | Facility: HOSPITAL | Age: 79
LOS: 1 days | End: 2024-04-23
Payer: MEDICARE

## 2024-04-23 DIAGNOSIS — Z98.890 OTHER SPECIFIED POSTPROCEDURAL STATES: Chronic | ICD-10-CM

## 2024-04-23 DIAGNOSIS — C10.9 MALIGNANT NEOPLASM OF OROPHARYNX, UNSPECIFIED: ICD-10-CM

## 2024-04-23 DIAGNOSIS — M54.2 CERVICALGIA: ICD-10-CM

## 2024-04-23 PROCEDURE — 76536 US EXAM OF HEAD AND NECK: CPT | Mod: 26

## 2024-04-23 PROCEDURE — 93880 EXTRACRANIAL BILAT STUDY: CPT | Mod: 26,59

## 2024-04-23 PROCEDURE — 76536 US EXAM OF HEAD AND NECK: CPT

## 2024-04-23 PROCEDURE — 93880 EXTRACRANIAL BILAT STUDY: CPT | Mod: XU

## 2024-04-24 ENCOUNTER — RESULT REVIEW (OUTPATIENT)
Age: 79
End: 2024-04-24

## 2024-04-24 DIAGNOSIS — E04.1 NONTOXIC SINGLE THYROID NODULE: ICD-10-CM

## 2024-04-29 ENCOUNTER — OUTPATIENT (OUTPATIENT)
Dept: OUTPATIENT SERVICES | Facility: HOSPITAL | Age: 79
LOS: 1 days | End: 2024-04-29
Payer: MEDICARE

## 2024-04-29 ENCOUNTER — APPOINTMENT (OUTPATIENT)
Dept: ULTRASOUND IMAGING | Facility: HOSPITAL | Age: 79
End: 2024-04-29
Payer: MEDICARE

## 2024-04-29 ENCOUNTER — APPOINTMENT (OUTPATIENT)
Dept: ULTRASOUND IMAGING | Facility: HOSPITAL | Age: 79
End: 2024-04-29

## 2024-04-29 DIAGNOSIS — E04.1 NONTOXIC SINGLE THYROID NODULE: ICD-10-CM

## 2024-04-29 DIAGNOSIS — Z98.890 OTHER SPECIFIED POSTPROCEDURAL STATES: Chronic | ICD-10-CM

## 2024-04-29 PROCEDURE — 76536 US EXAM OF HEAD AND NECK: CPT

## 2024-04-29 PROCEDURE — 76536 US EXAM OF HEAD AND NECK: CPT | Mod: 26

## 2024-05-08 ENCOUNTER — RESULT REVIEW (OUTPATIENT)
Age: 79
End: 2024-05-08

## 2024-05-08 ENCOUNTER — OUTPATIENT (OUTPATIENT)
Dept: OUTPATIENT SERVICES | Facility: HOSPITAL | Age: 79
LOS: 1 days | End: 2024-05-08
Payer: MEDICARE

## 2024-05-08 ENCOUNTER — APPOINTMENT (OUTPATIENT)
Dept: ULTRASOUND IMAGING | Facility: HOSPITAL | Age: 79
End: 2024-05-08
Payer: MEDICARE

## 2024-05-08 DIAGNOSIS — Z98.890 OTHER SPECIFIED POSTPROCEDURAL STATES: Chronic | ICD-10-CM

## 2024-05-08 DIAGNOSIS — Z90.49 ACQUIRED ABSENCE OF OTHER SPECIFIED PARTS OF DIGESTIVE TRACT: Chronic | ICD-10-CM

## 2024-05-08 PROCEDURE — 88173 CYTOPATH EVAL FNA REPORT: CPT | Mod: 26

## 2024-05-08 PROCEDURE — 88173 CYTOPATH EVAL FNA REPORT: CPT

## 2024-05-08 PROCEDURE — 10005 FNA BX W/US GDN 1ST LES: CPT

## 2024-05-08 PROCEDURE — 88305 TISSUE EXAM BY PATHOLOGIST: CPT

## 2024-05-08 PROCEDURE — 88305 TISSUE EXAM BY PATHOLOGIST: CPT | Mod: 26

## 2024-05-13 LAB — NON-GYNECOLOGICAL CYTOLOGY STUDY: SIGNIFICANT CHANGE UP

## 2024-05-14 ENCOUNTER — APPOINTMENT (OUTPATIENT)
Dept: OTOLARYNGOLOGY | Facility: CLINIC | Age: 79
End: 2024-05-14

## 2024-09-24 ENCOUNTER — INPATIENT (INPATIENT)
Facility: HOSPITAL | Age: 79
LOS: 0 days | Discharge: HOME | DRG: 390 | End: 2024-09-25
Attending: HOSPITALIST | Admitting: INTERNAL MEDICINE
Payer: MEDICARE

## 2024-09-24 VITALS
OXYGEN SATURATION: 95 % | HEART RATE: 66 BPM | WEIGHT: 158.73 LBS | DIASTOLIC BLOOD PRESSURE: 85 MMHG | SYSTOLIC BLOOD PRESSURE: 158 MMHG | TEMPERATURE: 98 F | HEIGHT: 68 IN | RESPIRATION RATE: 18 BRPM

## 2024-09-24 DIAGNOSIS — Z90.49 ACQUIRED ABSENCE OF OTHER SPECIFIED PARTS OF DIGESTIVE TRACT: Chronic | ICD-10-CM

## 2024-09-24 DIAGNOSIS — Z98.890 OTHER SPECIFIED POSTPROCEDURAL STATES: Chronic | ICD-10-CM

## 2024-09-24 DIAGNOSIS — K56.609 UNSPECIFIED INTESTINAL OBSTRUCTION, UNSPECIFIED AS TO PARTIAL VERSUS COMPLETE OBSTRUCTION: ICD-10-CM

## 2024-09-24 LAB
ALBUMIN SERPL ELPH-MCNC: 3.7 G/DL — SIGNIFICANT CHANGE UP (ref 3.3–5)
ALP SERPL-CCNC: 97 U/L — SIGNIFICANT CHANGE UP (ref 40–120)
ALT FLD-CCNC: 15 U/L — SIGNIFICANT CHANGE UP (ref 10–45)
ANION GAP SERPL CALC-SCNC: 8 MMOL/L — SIGNIFICANT CHANGE UP (ref 5–17)
APTT BLD: 30.7 SEC — SIGNIFICANT CHANGE UP (ref 24.5–35.6)
AST SERPL-CCNC: 18 U/L — SIGNIFICANT CHANGE UP (ref 10–40)
BASOPHILS # BLD AUTO: 0.02 K/UL — SIGNIFICANT CHANGE UP (ref 0–0.2)
BASOPHILS NFR BLD AUTO: 0.1 % — SIGNIFICANT CHANGE UP (ref 0–2)
BILIRUB SERPL-MCNC: 0.6 MG/DL — SIGNIFICANT CHANGE UP (ref 0.2–1.2)
BUN SERPL-MCNC: 19 MG/DL — SIGNIFICANT CHANGE UP (ref 7–23)
CALCIUM SERPL-MCNC: 9.2 MG/DL — SIGNIFICANT CHANGE UP (ref 8.4–10.5)
CHLORIDE SERPL-SCNC: 103 MMOL/L — SIGNIFICANT CHANGE UP (ref 96–108)
CO2 SERPL-SCNC: 27 MMOL/L — SIGNIFICANT CHANGE UP (ref 22–31)
CREAT SERPL-MCNC: 0.96 MG/DL — SIGNIFICANT CHANGE UP (ref 0.5–1.3)
EGFR: 81 ML/MIN/1.73M2 — SIGNIFICANT CHANGE UP
EOSINOPHIL # BLD AUTO: 0.01 K/UL — SIGNIFICANT CHANGE UP (ref 0–0.5)
EOSINOPHIL NFR BLD AUTO: 0.1 % — SIGNIFICANT CHANGE UP (ref 0–6)
GLUCOSE SERPL-MCNC: 151 MG/DL — HIGH (ref 70–99)
HCT VFR BLD CALC: 43.7 % — SIGNIFICANT CHANGE UP (ref 39–50)
HGB BLD-MCNC: 14.8 G/DL — SIGNIFICANT CHANGE UP (ref 13–17)
IMM GRANULOCYTES NFR BLD AUTO: 0.3 % — SIGNIFICANT CHANGE UP (ref 0–0.9)
INR BLD: 1.04 RATIO — SIGNIFICANT CHANGE UP (ref 0.85–1.16)
LIDOCAIN IGE QN: 25 U/L — SIGNIFICANT CHANGE UP (ref 16–77)
LYMPHOCYTES # BLD AUTO: 0.73 K/UL — LOW (ref 1–3.3)
LYMPHOCYTES # BLD AUTO: 4.7 % — LOW (ref 13–44)
MCHC RBC-ENTMCNC: 32.1 PG — SIGNIFICANT CHANGE UP (ref 27–34)
MCHC RBC-ENTMCNC: 33.9 GM/DL — SIGNIFICANT CHANGE UP (ref 32–36)
MCV RBC AUTO: 94.8 FL — SIGNIFICANT CHANGE UP (ref 80–100)
MONOCYTES # BLD AUTO: 0.47 K/UL — SIGNIFICANT CHANGE UP (ref 0–0.9)
MONOCYTES NFR BLD AUTO: 3 % — SIGNIFICANT CHANGE UP (ref 2–14)
NEUTROPHILS # BLD AUTO: 14.29 K/UL — HIGH (ref 1.8–7.4)
NEUTROPHILS NFR BLD AUTO: 91.8 % — HIGH (ref 43–77)
NRBC # BLD: 0 /100 WBCS — SIGNIFICANT CHANGE UP (ref 0–0)
PLATELET # BLD AUTO: 216 K/UL — SIGNIFICANT CHANGE UP (ref 150–400)
POTASSIUM SERPL-MCNC: 4.2 MMOL/L — SIGNIFICANT CHANGE UP (ref 3.5–5.3)
POTASSIUM SERPL-SCNC: 4.2 MMOL/L — SIGNIFICANT CHANGE UP (ref 3.5–5.3)
PROT SERPL-MCNC: 7.1 G/DL — SIGNIFICANT CHANGE UP (ref 6–8.3)
PROTHROM AB SERPL-ACNC: 12.3 SEC — SIGNIFICANT CHANGE UP (ref 9.9–13.4)
RBC # BLD: 4.61 M/UL — SIGNIFICANT CHANGE UP (ref 4.2–5.8)
RBC # FLD: 12.7 % — SIGNIFICANT CHANGE UP (ref 10.3–14.5)
SODIUM SERPL-SCNC: 138 MMOL/L — SIGNIFICANT CHANGE UP (ref 135–145)
WBC # BLD: 15.57 K/UL — HIGH (ref 3.8–10.5)
WBC # FLD AUTO: 15.57 K/UL — HIGH (ref 3.8–10.5)

## 2024-09-24 PROCEDURE — 71045 X-RAY EXAM CHEST 1 VIEW: CPT | Mod: 26

## 2024-09-24 PROCEDURE — 99223 1ST HOSP IP/OBS HIGH 75: CPT

## 2024-09-24 PROCEDURE — 74177 CT ABD & PELVIS W/CONTRAST: CPT | Mod: 26,MC

## 2024-09-24 PROCEDURE — 93010 ELECTROCARDIOGRAM REPORT: CPT

## 2024-09-24 PROCEDURE — 99285 EMERGENCY DEPT VISIT HI MDM: CPT

## 2024-09-24 RX ORDER — ONDANSETRON HCL/PF 4 MG/2 ML
4 VIAL (ML) INJECTION EVERY 8 HOURS
Refills: 0 | Status: DISCONTINUED | OUTPATIENT
Start: 2024-09-24 | End: 2024-09-25

## 2024-09-24 RX ORDER — SODIUM CHLORIDE IRRIG SOLUTION 0.9 %
1000 SOLUTION, IRRIGATION IRRIGATION ONCE
Refills: 0 | Status: COMPLETED | OUTPATIENT
Start: 2024-09-24 | End: 2024-09-24

## 2024-09-24 RX ORDER — PANTOPRAZOLE SODIUM 40 MG/1
40 TABLET, DELAYED RELEASE ORAL DAILY
Refills: 0 | Status: DISCONTINUED | OUTPATIENT
Start: 2024-09-24 | End: 2024-09-25

## 2024-09-24 RX ORDER — INFLUENZA VIRUS VACCINE 15; 15; 15; 15 UG/.5ML; UG/.5ML; UG/.5ML; UG/.5ML
0.5 SUSPENSION INTRAMUSCULAR ONCE
Refills: 0 | Status: DISCONTINUED | OUTPATIENT
Start: 2024-09-24 | End: 2024-09-25

## 2024-09-24 RX ORDER — SODIUM CHLORIDE IRRIG SOLUTION 0.9 %
1000 SOLUTION, IRRIGATION IRRIGATION
Refills: 0 | Status: DISCONTINUED | OUTPATIENT
Start: 2024-09-24 | End: 2024-09-25

## 2024-09-24 RX ADMIN — Medication 1000 MILLILITER(S): at 19:18

## 2024-09-24 RX ADMIN — Medication 100 MILLILITER(S): at 23:27

## 2024-09-24 RX ADMIN — Medication 5000 UNIT(S): at 22:44

## 2024-09-24 NOTE — ED ADULT NURSE NOTE - OBJECTIVE STATEMENT
79 year old Male comes to the ER after getting off a plane from [employer] with abdominal distention and pain. On flight an MD and cardiologist saw pain and gave him unknown pain medication. Patient reports pain has improved however comes to the ER as pain was still abnormal. Patient tender to left side of abdomen and lower 79 year old Male comes to the ER after getting off a plane from [employer] with abdominal distention and pain. On flight an MD and cardiologist saw patient and gave him unknown pain medication. Patient reports pain has improved however comes to the ER as pain was still abnormal. Patient tender to left side of abdomen and lower abdomen with palpation. Patient reports last BM today and is passing gas. Denies urinary symptoms. Patient denies previous abdominal surgeries. Patient alert and oriented, breathing spontaneous and unlabored, on room air, saturating within normal limits, moves all extremities, ambulates with steady gait noted. Bed locked and in lowest position for safety.

## 2024-09-24 NOTE — H&P ADULT - NSHPLABSRESULTS_GEN_ALL_CORE
14.8   15.57 )-----------( 216      ( 24 Sep 2024 18:18 )             43.7       09-24    138  |  103  |  19  ----------------------------<  151[H]  4.2   |  27  |  0.96    Ca    9.2      24 Sep 2024 18:18    TPro  7.1  /  Alb  3.7  /  TBili  0.6  /  DBili  x   /  AST  18  /  ALT  15  /  AlkPhos  97  09-24         LIVER FUNCTIONS - ( 24 Sep 2024 18:18 )  Alb: 3.7 g/dL / Pro: 7.1 g/dL / ALK PHOS: 97 U/L / ALT: 15 U/L / AST: 18 U/L / GGT: x             Lipase: 25 U/L (09-24-24 @ 18:18)    PT/INR - ( 24 Sep 2024 19:58 )   PT: 12.3 sec;   INR: 1.04 ratio         PTT - ( 24 Sep 2024 19:58 )  PTT:30.7 sec          Urinalysis Basic - ( 24 Sep 2024 18:18 )    Color: x / Appearance: x / SG: x / pH: x  Gluc: 151 mg/dL / Ketone: x  / Bili: x / Urobili: x   Blood: x / Protein: x / Nitrite: x   Leuk Esterase: x / RBC: x / WBC x   Sq Epi: x / Non Sq Epi: x / Bacteria: x        CAPILLARY BLOOD GLUCOSE            EKG: personally rev. NSR at 65bpm no acute ST changs      CXR: wet read. NAPD    CTAP:   rad< from: CT Abdomen and Pelvis w/ IV Cont (09.24.24 @ 19:25) >    IMPRESSION:  Small bowel obstruction with transition point in the lower central   abdomen.    < end of copied text >

## 2024-09-24 NOTE — ED PROVIDER NOTE - CLINICAL SUMMARY MEDICAL DECISION MAKING FREE TEXT BOX
78yo M with no relevant PMHx p/w sudden abdominal pain. Differential is broad and includes gallbladder pathology, diverticulitis, gastritis, gastroenteritis, etc. Patient overall well appearing; given no radiation doubt aortic pathology. 78yo M with no relevant PMHx p/w sudden abdominal pain. Differential is broad and includes gallbladder pathology, diverticulitis, gastritis, gastroenteritis, etc. Patient overall well appearing; given no radiation doubt aortic pathology.    Update: 2000: Patient CT shows bowel obstruction.  Patient states he is feeling better.  Pain is minimal currently.  He has not vomited.  Abdomen soft, minimal infraumbilical tenderness.  Will admit for further treatment.  Case discussed with hospitalist attending Dr. Paul, accepting admission.  Surgery consult paged.  Dr. Henry

## 2024-09-24 NOTE — H&P ADULT - HISTORY OF PRESENT ILLNESS
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 Pt was returning home from [place] after vacation and while on the plane developed severe diffuse abd pain associated with nausea and some dry heaves, abdominal bloating. Has not passed gas or had BM today. Denied fevers, chills, diarrhea, dysuria. No SOB, CP, palps. On plane was given an 'anti-spasmodic' by physician on board. Since arrival to ED, sxs have somewhat improved and now isolated mostly to the L. Was given 1L LR by ED.   In ED afebrile P: 66 BP: 158/85 sat 95% on RA  WBC: 15.6 92% B ggb: 14,8 Cr; 0.96 LFTs wnl.   CTAP:   < from: CT Abdomen and Pelvis w/ IV Cont (09.24.24 @ 19:25) >  IMPRESSION:  Small bowel obstruction with transition point in the lower central   abdomen.    < end of copied text >

## 2024-09-24 NOTE — H&P ADULT - ASSESSMENT
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  IVFs.   NPO   anti-emetic  Surgery Dr Flaherty already consulted by ED.   NGT if vomiting.     #DVT/GI proph.     Family wife and daughter Jc (First contact)  [phone] updated.   Second emergency contact is Nemesio son [phone]    CAPRINI SCORE [CLOT]    AGE RELATED RISK FACTORS                                                       MOBILITY RELATED FACTORS  [ ] Age 41-60 years                                            (1 Point)                  [ ] Bed rest                                                        (1 Point)  [ ] Age: 61-74 years                                           (2 Points)                 [ ] Plaster cast                                                   (2 Points)  [3 ] Age= 75 years                                              (3 Points)                 [ ] Bed bound for more than 72 hours                 (2 Points)    DISEASE RELATED RISK FACTORS                                               GENDER SPECIFIC FACTORS  [ ] Edema in the lower extremities                       (1 Point)                  [ ] Pregnancy                                                     (1 Point)  [ ] Varicose veins                                               (1 Point)                  [ ] Post-partum < 6 weeks                                   (1 Point)             [ ] BMI > 25 Kg/m2                                            (1 Point)                  [ ] Hormonal therapy  or oral contraception          (1 Point)                 [ ] Sepsis (in the previous month)                        (1 Point)                  [ ] History of pregnancy complications                 (1 point)  [ ] Pneumonia or serious lung disease                                               [ ] Unexplained or recurrent                     (1 Point)           (in the previous month)                               (1 Point)  [ ] Abnormal pulmonary function test                     (1 Point)                 SURGERY RELATED RISK FACTORS  [ ] Acute myocardial infarction                              (1 Point)                 [ ]  Section                                             (1 Point)  [ ] Congestive heart failure (in the previous month)  (1 Point)               [ ] Minor surgery                                                  (1 Point)   [ ] Inflammatory bowel disease                             (1 Point)                 [ ] Arthroscopic surgery                                        (2 Points)  [ ] Central venous access                                      (2 Points)                [ ] General surgery lasting more than 45 minutes   (2 Points)       [ ] Stroke (in the previous month)                          (5 Points)               [ ] Elective arthroplasty                                         (5 Points)                                                                                                                                               HEMATOLOGY RELATED FACTORS                                                 TRAUMA RELATED RISK FACTORS  [ ] Prior episodes of VTE                                     (3 Points)                [ ] Fracture of the hip, pelvis, or leg                       (5 Points)  [ ] Positive family history for VTE                         (3 Points)                 [ ] Acute spinal cord injury (in the previous month)  (5 Points)  [ ] Prothrombin 26688 A                                     (3 Points)                 [ ] Paralysis  (less than 1 month)                             (5 Points)  [ ] Factor V Leiden                                             (3 Points)                  [ ] Multiple Trauma within 1 month                        (5 Points)  [ ] Lupus anticoagulants                                     (3 Points)                                                           [ ] Anticardiolipin antibodies                               (3 Points)                                                       [ ] High homocysteine in the blood                      (3 Points)                                             [ ] Other congenital or acquired thrombophilia      (3 Points)                                                [ ] Heparin induced thrombocytopenia                  (3 Points)                                          Total Score [    3      ]    Caprini Score 0 - 2:  Low Risk, No VTE Prophylaxis required for most patients, encourage ambulation  Caprini Score 3 - 6:  At Risk, pharmacologic VTE prophylaxis is indicated for most patients (in the absence of a contraindication)  Caprini Score Greater than or = 7:  High Risk, pharmacologic VTE prophylaxis is indicated for most patients (in the absence of a contraindication) 79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  IVFs.   NPO   anti-emetic  Surgery Dr Flaherty already consulted by ED.   NGT if vomiting.     #leukocytosis  likely reactive.  hx of chronic urinary frequency, check UA to be complete.     #DVT/GI proph.     Family wife and daughter Jc (First contact)  [phone] updated.   Second emergency contact is Nemesio son [phone]    CAPRINI SCORE [CLOT]    AGE RELATED RISK FACTORS                                                       MOBILITY RELATED FACTORS  [ ] Age 41-60 years                                            (1 Point)                  [ ] Bed rest                                                        (1 Point)  [ ] Age: 61-74 years                                           (2 Points)                 [ ] Plaster cast                                                   (2 Points)  [3 ] Age= 75 years                                              (3 Points)                 [ ] Bed bound for more than 72 hours                 (2 Points)    DISEASE RELATED RISK FACTORS                                               GENDER SPECIFIC FACTORS  [ ] Edema in the lower extremities                       (1 Point)                  [ ] Pregnancy                                                     (1 Point)  [ ] Varicose veins                                               (1 Point)                  [ ] Post-partum < 6 weeks                                   (1 Point)             [ ] BMI > 25 Kg/m2                                            (1 Point)                  [ ] Hormonal therapy  or oral contraception          (1 Point)                 [ ] Sepsis (in the previous month)                        (1 Point)                  [ ] History of pregnancy complications                 (1 point)  [ ] Pneumonia or serious lung disease                                               [ ] Unexplained or recurrent                     (1 Point)           (in the previous month)                               (1 Point)  [ ] Abnormal pulmonary function test                     (1 Point)                 SURGERY RELATED RISK FACTORS  [ ] Acute myocardial infarction                              (1 Point)                 [ ]  Section                                             (1 Point)  [ ] Congestive heart failure (in the previous month)  (1 Point)               [ ] Minor surgery                                                  (1 Point)   [ ] Inflammatory bowel disease                             (1 Point)                 [ ] Arthroscopic surgery                                        (2 Points)  [ ] Central venous access                                      (2 Points)                [ ] General surgery lasting more than 45 minutes   (2 Points)       [ ] Stroke (in the previous month)                          (5 Points)               [ ] Elective arthroplasty                                         (5 Points)                                                                                                                                               HEMATOLOGY RELATED FACTORS                                                 TRAUMA RELATED RISK FACTORS  [ ] Prior episodes of VTE                                     (3 Points)                [ ] Fracture of the hip, pelvis, or leg                       (5 Points)  [ ] Positive family history for VTE                         (3 Points)                 [ ] Acute spinal cord injury (in the previous month)  (5 Points)  [ ] Prothrombin 65423 A                                     (3 Points)                 [ ] Paralysis  (less than 1 month)                             (5 Points)  [ ] Factor V Leiden                                             (3 Points)                  [ ] Multiple Trauma within 1 month                        (5 Points)  [ ] Lupus anticoagulants                                     (3 Points)                                                           [ ] Anticardiolipin antibodies                               (3 Points)                                                       [ ] High homocysteine in the blood                      (3 Points)                                             [ ] Other congenital or acquired thrombophilia      (3 Points)                                                [ ] Heparin induced thrombocytopenia                  (3 Points)                                          Total Score [    3      ]    Caprini Score 0 - 2:  Low Risk, No VTE Prophylaxis required for most patients, encourage ambulation  Caprini Score 3 - 6:  At Risk, pharmacologic VTE prophylaxis is indicated for most patients (in the absence of a contraindication)  Caprini Score Greater than or = 7:  High Risk, pharmacologic VTE prophylaxis is indicated for most patients (in the absence of a contraindication)

## 2024-09-24 NOTE — ED ADULT TRIAGE NOTE - AS HEIGHT TYPE
stated Posterior Auricular Interpolation Flap Text: A decision was made to reconstruct the defect utilizing an interpolation axial flap and a staged reconstruction.  A telfa template was made of the defect.  This telfa template was then used to outline the posterior auricular interpolation flap.  The donor area for the pedicle flap was then injected with anesthesia.  The flap was excised through the skin and subcutaneous tissue down to the layer of the underlying musculature.  The pedicle flap was carefully excised within this deep plane to maintain its blood supply.  The edges of the donor site were undermined.   The donor site was closed in a primary fashion.  The pedicle was then rotated into position and sutured.  Once the tube was sutured into place, adequate blood supply was confirmed with blanching and refill.  The pedicle was then wrapped with xeroform gauze and dressed appropriately with a telfa and gauze bandage to ensure continued blood supply and protect the attached pedicle.

## 2024-09-24 NOTE — ED PROVIDER NOTE - OBJECTIVE STATEMENT
80yo M with PMHx of oral carcinoma s/p resection p/w abdominal pain. He endorses that he was recently in [place] for vacation and on the plane flight back to the [place] he started to develop sudden abdominal pain. It was associated with nausea, but no vomiting and denies any diarrhea/constipation. He has never had similar symptoms in the past. He denies any recent illnesses, fevers, chills or sick contacts. He endorses the pain is primarily in the upper abdomen without radiation. It has improved since it started, but he wanted to ensure no severe abnormalities.

## 2024-09-24 NOTE — ED ADULT TRIAGE NOTE - CHIEF COMPLAINT QUOTE
Pt presents to the ED with the complaint of abdominal pain that started while he was on the flight back from [place]. Pt states that he just landed and came to the hospital. Pt denies chest pain.

## 2024-09-24 NOTE — PATIENT PROFILE ADULT - FALL HARM RISK - HARM RISK INTERVENTIONS
Communicate Risk of Fall with Harm to all staff/Reinforce activity limits and safety measures with patient and family/Sit up slowly, dangle for a short time, stand at bedside before walking/Tailored Fall Risk Interventions/Use of alarms - bed, chair and/or voice tab/Visual Cue: Yellow wristband and red socks/Bed in lowest position, wheels locked, appropriate side rails in place/Call bell, personal items and telephone in reach/Instruct patient to call for assistance before getting out of bed or chair/Non-slip footwear when patient is out of bed/[place] to call system/Physically safe environment - no spills, clutter or unnecessary equipment/Purposeful Proactive Rounding/Room/bathroom lighting operational, light cord in reach

## 2024-09-24 NOTE — ED PROVIDER NOTE - PHYSICAL EXAMINATION
PHYSICAL EXAM:  GENERAL: NAD, well-developed  HEAD:  Atraumatic, Normocephalic  EYES: EOMI, PERRLA, conjunctiva and sclera clear  NECK: Supple, No JVD  CHEST/LUNG: Clear to auscultation bilaterally; No wheeze  HEART: Regular rate and rhythm; No murmurs, rubs, or gallops  ABDOMEN: Soft, tender in epigastric and RUQ areas.  + bowel sounds  EXTREMITIES:  2+ Peripheral Pulses, No clubbing, cyanosis, or edema  PSYCH: AAOx3  NEUROLOGY: non-focal  SKIN: No rashes or lesions

## 2024-09-24 NOTE — PATIENT PROFILE ADULT - CONTRAINDICATIONS & PRECAUTIONS (SELECT ALL THAT APPLY)
Adelfo Justin Red is a 35 year old male presenting with a left side initial injury DOI 01/05/2018 Priti.    Currently is not taking any medications  Denies known Latex allergy or symptoms of Latex sensitivity     none... [Normal] : Psychiatric [FreeTextEntry3] : Wears glasses [FreeTextEntry7] : Drinks 3-4 cups water daily. Good appetite. No caffeine [FreeTextEntry8] : Difficulty sleeping due to congestion. Endorses daytime sleepiness

## 2024-09-24 NOTE — H&P ADULT - NSHPREVIEWOFSYSTEMS_GEN_ALL_CORE
CONSTITUTIONAL: No fever, weight loss, or fatigue  EYES: No eye pain, visual disturbances, or discharge  ENMT:  No difficulty hearing, tinnitus, vertigo; No sinus or throat pain  NECK: No pain or stiffness  RESPIRATORY: No cough, wheezing, chills or hemoptysis; No shortness of breath  CARDIOVASCULAR: No chest pain, palpitations, dizziness, or leg swelling  GASTROINTESTINAL: + abdominal  pain and bloating. + nausea, no vomiting, or hematemesis; No diarrhea or constipation. No melena or hematochezia.  GENITOURINARY: No dysuria, +frequency, no hematuria, or incontinence  NEUROLOGICAL: No headaches, memory loss, loss of strength, numbness, or tremors  SKIN: No itching, burning, rashes, or lesions   LYMPH NODES: No enlarged glands  ENDOCRINE: No heat or cold intolerance; No hair loss  MUSCULOSKELETAL: No joint pain or swelling; No muscle, back, or extremity pain  PSYCHIATRIC: No depression, anxiety, mood swings, or difficulty sleeping  HEME/LYMPH: No easy bruising, or bleeding gums  ALLERY AND IMMUNOLOGIC: No hives or eczema

## 2024-09-24 NOTE — ED ADULT NURSE NOTE - CHIEF COMPLAINT QUOTE
Pt presents to the ED with the complaint of abdominal pain that started while he was on the flight back from [place]. Pt states that he just landed and came to the hospital. Pt denies chest pain.

## 2024-09-25 ENCOUNTER — TRANSCRIPTION ENCOUNTER (OUTPATIENT)
Age: 79
End: 2024-09-25

## 2024-09-25 VITALS
HEART RATE: 67 BPM | SYSTOLIC BLOOD PRESSURE: 134 MMHG | TEMPERATURE: 98 F | RESPIRATION RATE: 18 BRPM | OXYGEN SATURATION: 95 % | DIASTOLIC BLOOD PRESSURE: 72 MMHG

## 2024-09-25 LAB
ALBUMIN SERPL ELPH-MCNC: 3.4 G/DL — SIGNIFICANT CHANGE UP (ref 3.3–5)
ALP SERPL-CCNC: 91 U/L — SIGNIFICANT CHANGE UP (ref 40–120)
ALT FLD-CCNC: 16 U/L — SIGNIFICANT CHANGE UP (ref 10–45)
ANION GAP SERPL CALC-SCNC: 7 MMOL/L — SIGNIFICANT CHANGE UP (ref 5–17)
APPEARANCE UR: CLEAR — SIGNIFICANT CHANGE UP
AST SERPL-CCNC: 15 U/L — SIGNIFICANT CHANGE UP (ref 10–40)
BACTERIA # UR AUTO: NEGATIVE /HPF — SIGNIFICANT CHANGE UP
BASOPHILS # BLD AUTO: 0.02 K/UL — SIGNIFICANT CHANGE UP (ref 0–0.2)
BASOPHILS NFR BLD AUTO: 0.2 % — SIGNIFICANT CHANGE UP (ref 0–2)
BILIRUB SERPL-MCNC: 0.8 MG/DL — SIGNIFICANT CHANGE UP (ref 0.2–1.2)
BILIRUB UR-MCNC: NEGATIVE — SIGNIFICANT CHANGE UP
BUN SERPL-MCNC: 14 MG/DL — SIGNIFICANT CHANGE UP (ref 7–23)
CALCIUM SERPL-MCNC: 9.1 MG/DL — SIGNIFICANT CHANGE UP (ref 8.4–10.5)
CHLORIDE SERPL-SCNC: 104 MMOL/L — SIGNIFICANT CHANGE UP (ref 96–108)
CO2 SERPL-SCNC: 30 MMOL/L — SIGNIFICANT CHANGE UP (ref 22–31)
COLOR SPEC: YELLOW — SIGNIFICANT CHANGE UP
CREAT SERPL-MCNC: 0.86 MG/DL — SIGNIFICANT CHANGE UP (ref 0.5–1.3)
DIFF PNL FLD: NEGATIVE — SIGNIFICANT CHANGE UP
EGFR: 89 ML/MIN/1.73M2 — SIGNIFICANT CHANGE UP
EOSINOPHIL # BLD AUTO: 0.19 K/UL — SIGNIFICANT CHANGE UP (ref 0–0.5)
EOSINOPHIL NFR BLD AUTO: 1.9 % — SIGNIFICANT CHANGE UP (ref 0–6)
EPI CELLS # UR: 1 — SIGNIFICANT CHANGE UP
GLUCOSE SERPL-MCNC: 102 MG/DL — HIGH (ref 70–99)
GLUCOSE UR QL: NEGATIVE MG/DL — SIGNIFICANT CHANGE UP
HCT VFR BLD CALC: 43.1 % — SIGNIFICANT CHANGE UP (ref 39–50)
HGB BLD-MCNC: 14.3 G/DL — SIGNIFICANT CHANGE UP (ref 13–17)
IMM GRANULOCYTES NFR BLD AUTO: 0.2 % — SIGNIFICANT CHANGE UP (ref 0–0.9)
KETONES UR-MCNC: NEGATIVE MG/DL — SIGNIFICANT CHANGE UP
LACTATE SERPL-SCNC: 1 MMOL/L — SIGNIFICANT CHANGE UP (ref 0.7–2)
LEUKOCYTE ESTERASE UR-ACNC: NEGATIVE — SIGNIFICANT CHANGE UP
LYMPHOCYTES # BLD AUTO: 1.69 K/UL — SIGNIFICANT CHANGE UP (ref 1–3.3)
LYMPHOCYTES # BLD AUTO: 17.3 % — SIGNIFICANT CHANGE UP (ref 13–44)
MAGNESIUM SERPL-MCNC: 2 MG/DL — SIGNIFICANT CHANGE UP (ref 1.6–2.6)
MCHC RBC-ENTMCNC: 31.8 PG — SIGNIFICANT CHANGE UP (ref 27–34)
MCHC RBC-ENTMCNC: 33.2 GM/DL — SIGNIFICANT CHANGE UP (ref 32–36)
MCV RBC AUTO: 95.8 FL — SIGNIFICANT CHANGE UP (ref 80–100)
MONOCYTES # BLD AUTO: 0.78 K/UL — SIGNIFICANT CHANGE UP (ref 0–0.9)
MONOCYTES NFR BLD AUTO: 8 % — SIGNIFICANT CHANGE UP (ref 2–14)
NEUTROPHILS # BLD AUTO: 7.08 K/UL — SIGNIFICANT CHANGE UP (ref 1.8–7.4)
NEUTROPHILS NFR BLD AUTO: 72.4 % — SIGNIFICANT CHANGE UP (ref 43–77)
NITRITE UR-MCNC: NEGATIVE — SIGNIFICANT CHANGE UP
NRBC # BLD: 0 /100 WBCS — SIGNIFICANT CHANGE UP (ref 0–0)
PH UR: 7.5 — SIGNIFICANT CHANGE UP (ref 5–8)
PLATELET # BLD AUTO: 209 K/UL — SIGNIFICANT CHANGE UP (ref 150–400)
POTASSIUM SERPL-MCNC: 3.9 MMOL/L — SIGNIFICANT CHANGE UP (ref 3.5–5.3)
POTASSIUM SERPL-SCNC: 3.9 MMOL/L — SIGNIFICANT CHANGE UP (ref 3.5–5.3)
PROT SERPL-MCNC: 6.4 G/DL — SIGNIFICANT CHANGE UP (ref 6–8.3)
PROT UR-MCNC: NEGATIVE MG/DL — SIGNIFICANT CHANGE UP
RBC # BLD: 4.5 M/UL — SIGNIFICANT CHANGE UP (ref 4.2–5.8)
RBC # FLD: 12.8 % — SIGNIFICANT CHANGE UP (ref 10.3–14.5)
RBC CASTS # UR COMP ASSIST: 0 /HPF — SIGNIFICANT CHANGE UP (ref 0–4)
SODIUM SERPL-SCNC: 141 MMOL/L — SIGNIFICANT CHANGE UP (ref 135–145)
SP GR SPEC: 1.01 — SIGNIFICANT CHANGE UP (ref 1–1.03)
UROBILINOGEN FLD QL: 0.2 MG/DL — SIGNIFICANT CHANGE UP (ref 0.2–1)
WBC # BLD: 9.78 K/UL — SIGNIFICANT CHANGE UP (ref 3.8–10.5)
WBC # FLD AUTO: 9.78 K/UL — SIGNIFICANT CHANGE UP (ref 3.8–10.5)
WBC UR QL: 0 /HPF — SIGNIFICANT CHANGE UP (ref 0–5)

## 2024-09-25 PROCEDURE — 99239 HOSP IP/OBS DSCHRG MGMT >30: CPT

## 2024-09-25 PROCEDURE — 99284 EMERGENCY DEPT VISIT MOD MDM: CPT | Mod: FS

## 2024-09-25 RX ADMIN — Medication 5000 UNIT(S): at 05:24

## 2024-09-25 RX ADMIN — PANTOPRAZOLE SODIUM 40 MILLIGRAM(S): 40 TABLET, DELAYED RELEASE ORAL at 12:57

## 2024-09-25 RX ADMIN — Medication 100 MILLILITER(S): at 09:38

## 2024-09-25 RX ADMIN — Medication 100 MILLILITER(S): at 05:24

## 2024-09-25 NOTE — DISCHARGE NOTE PROVIDER - CARE PROVIDER_API CALL
Caitie Unger  [facility]  [address] [place] [id number]  Phone: [phone]  Fax: [phone]  Follow Up Time:

## 2024-09-25 NOTE — DISCHARGE NOTE PROVIDER - NSDCCPCAREPLAN_GEN_ALL_CORE_FT
PRINCIPAL DISCHARGE DIAGNOSIS  Diagnosis: SBO (small bowel obstruction)  Assessment and Plan of Treatment: You were thought to have a small bowel obstruction. You were seen by surgery, who determined you do not need surgical intervention. Please follow up with your outpatient provider in 2 weeks.

## 2024-09-25 NOTE — DISCHARGE NOTE NURSING/CASE MANAGEMENT/SOCIAL WORK - PATIENT PORTAL LINK FT
You can access the FollowMyHealth Patient Portal offered by [facility] by registering at the following website: [email or web address]. By joining [facility]’s FollowMyHealth portal, you will also be able to view your health information using other applications (apps) compatible with our system.

## 2024-09-25 NOTE — PROGRESS NOTE ADULT - SUBJECTIVE AND OBJECTIVE BOX
Patient is a 79y old  Male who presents with a chief complaint of SBO (24 Sep 2024 21:31)      Patient seen and examined at bedside. No overnight events reported.     ALLERGIES:  No Known Drug Allergies  pollen...sneezing; runny nose. (Other)    MEDICATIONS  (STANDING):  heparin   Injectable 5000 Unit(s) SubCutaneous every 8 hours  influenza  Vaccine (HIGH DOSE) 0.5 milliLiter(s) IntraMuscular once  lactated ringers. 1000 milliLiter(s) (100 mL/Hr) IV Continuous <Continuous>  pantoprazole  Injectable 40 milliGRAM(s) IV Push daily    MEDICATIONS  (PRN):  ondansetron Injectable 4 milliGRAM(s) IV Push every 8 hours PRN Nausea and/or Vomiting    Vital Signs Last 24 Hrs  T(F): 97.9 (25 Sep 2024 05:24), Max: 97.9 (24 Sep 2024 17:39)  HR: 69 (25 Sep 2024 05:24) (64 - 69)  BP: 143/74 (25 Sep 2024 05:24) (143/74 - 161/72)  RR: 18 (25 Sep 2024 05:24) (18 - 18)  SpO2: 96% (25 Sep 2024 05:24) (95% - 97%)  I&O's Summary    PHYSICAL EXAM:  General: NAD, A/O x 3, patient seen ambulating hallway   ENT: No gross hearing impairment, Moist mucous membranes, no thrush  Neck: Supple, No JVD  Lungs: Clear to auscultation bilaterally, good air entry, non-labored breathing  Cardio: RRR, S1/S2, No murmur  Abdomen: Soft, Nontender, Nondistended; Bowel sounds present  Extremities: No calf tenderness, No cyanosis, No pitting edema  Psych: Appropriate mood and affect    LABS:                        14.3   9.78  )-----------( 209      ( 25 Sep 2024 06:54 )             43.1     09-25    141  |  104  |  14  ----------------------------<  102  3.9   |  30  |  0.86    Ca    9.1      25 Sep 2024 06:54  Mg     2.0     09-25    TPro  6.4  /  Alb  3.4  /  TBili  0.8  /  DBili  x   /  AST  15  /  ALT  16  /  AlkPhos  91  09-25      Lipase: 25 U/L (09-24-24 @ 18:18)      PT/INR - ( 24 Sep 2024 19:58 )   PT: 12.3 sec;   INR: 1.04 ratio         PTT - ( 24 Sep 2024 19:58 )  PTT:30.7 sec  Lactate, Blood: 1.0 mmol/L (09-25 @ 06:54)                            Urinalysis Basic - ( 25 Sep 2024 06:54 )    Color: x / Appearance: x / SG: x / pH: x  Gluc: 102 mg/dL / Ketone: x  / Bili: x / Urobili: x   Blood: x / Protein: x / Nitrite: x   Leuk Esterase: x / RBC: x / WBC x   Sq Epi: x / Non Sq Epi: x / Bacteria: x          RADIOLOGY & ADDITIONAL TESTS:    Care Discussed with Consultants/Other Providers:    Patient is a 79y old  Male who presents with a chief complaint of SBO (24 Sep 2024 21:31)      Patient seen and examined at bedside. No overnight events reported.     ALLERGIES:  No Known Drug Allergies  pollen...sneezing; runny nose. (Other)    MEDICATIONS  (STANDING):  heparin   Injectable 5000 Unit(s) SubCutaneous every 8 hours  influenza  Vaccine (HIGH DOSE) 0.5 milliLiter(s) IntraMuscular once  lactated ringers. 1000 milliLiter(s) (100 mL/Hr) IV Continuous <Continuous>  pantoprazole  Injectable 40 milliGRAM(s) IV Push daily    MEDICATIONS  (PRN):  ondansetron Injectable 4 milliGRAM(s) IV Push every 8 hours PRN Nausea and/or Vomiting    Vital Signs Last 24 Hrs  T(F): 97.9 (25 Sep 2024 05:24), Max: 97.9 (24 Sep 2024 17:39)  HR: 69 (25 Sep 2024 05:24) (64 - 69)  BP: 143/74 (25 Sep 2024 05:24) (143/74 - 161/72)  RR: 18 (25 Sep 2024 05:24) (18 - 18)  SpO2: 96% (25 Sep 2024 05:24) (95% - 97%)  I&O's Summary    PHYSICAL EXAM:  General: NAD, A/O x 3, patient seen ambulating hallway   ENT: No gross hearing impairment, Moist mucous membranes, no thrush  Neck: Supple, No JVD  Lungs: Clear to auscultation bilaterally, good air entry, non-labored breathing  Cardio: RRR, S1/S2, No murmur  Abdomen: Soft, Nontender, Nondistended; Bowel sounds present  Extremities: No calf tenderness, No cyanosis, No pitting edema  Psych: Appropriate mood and affect    LABS:                        14.3   9.78  )-----------( 209      ( 25 Sep 2024 06:54 )             43.1     09-25    141  |  104  |  14  ----------------------------<  102  3.9   |  30  |  0.86    Ca    9.1      25 Sep 2024 06:54  Mg     2.0     09-25    TPro  6.4  /  Alb  3.4  /  TBili  0.8  /  DBili  x   /  AST  15  /  ALT  16  /  AlkPhos  91  09-25      Lipase: 25 U/L (09-24-24 @ 18:18)      PT/INR - ( 24 Sep 2024 19:58 )   PT: 12.3 sec;   INR: 1.04 ratio         PTT - ( 24 Sep 2024 19:58 )  PTT:30.7 sec  Lactate, Blood: 1.0 mmol/L (09-25 @ 06:54)              Urinalysis Basic - ( 25 Sep 2024 06:54 )    Color: x / Appearance: x / SG: x / pH: x  Gluc: 102 mg/dL / Ketone: x  / Bili: x / Urobili: x   Blood: x / Protein: x / Nitrite: x   Leuk Esterase: x / RBC: x / WBC x   Sq Epi: x / Non Sq Epi: x / Bacteria: x

## 2024-09-25 NOTE — CONSULT NOTE ADULT - SUBJECTIVE AND OBJECTIVE BOX
GENERAL SURGERY CONSULT NOTE    Patient is a 79y old  Male who presents with a chief complaint of SBO (25 Sep 2024 12:45)    79M no PMHx and PSHx appendectomy who presents with abdominal pain. Pt was traveling home from [place] when he developed abdominal pain described as severe after eating with distension. Reports vomiting twice in the airport and having a bowel movement on 9/23. Once arriving in the ED reports pain resolved. In ED, CT performed and demonstrated "Small bowel obstruction with transition point in the lower central abdomen".  Surgery consulted for CT results. History of two colonoscopies with normal results.         Pt tolerated hospital lunch and had a BM on 9/25. Describes the BM as regular with no blood or loose consistency. Reports no further nausea or vomiting. Vitals wnl. CBC, CMP, UA wnl.       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 Pt was returning home from [place] after vacation and while on the plane developed severe diffuse abd pain associated with nausea and some dry heaves, abdominal bloating. Has not passed gas or had BM today. Denied fevers, chills, diarrhea, dysuria. No SOB, CP, palps. On plane was given an 'anti-spasmodic' by physician on board. Since arrival to ED, sxs have somewhat improved and now isolated mostly to the L. Was given 1L LR by ED.   In ED afebrile P: 66 BP: 158/85 sat 95% on RA  WBC: 15.6 92% B ggb: 14,8 Cr; 0.96 LFTs wnl.   CTAP:   < from: CT Abdomen and Pelvis w/ IV Cont (09.24.24 @ 19:25) >  IMPRESSION:  Small bowel obstruction with transition point in the lower central   abdomen.    < end of copied text >   (24 Sep 2024 21:31)      PAST MEDICAL & SURGICAL HISTORY:  Abnormal CT of liver      Acute URI      History of abdominal pain      History of acute sinusitis      History of acute bronchitis      Bilateral hearing loss      History of bronchiectasis      H/O Clostridium difficile infection      History of contact dermatitis      Coronary arteriosclerosis      Hip abductor tendinitis      Liver cyst      Lung nodule      Mass of lateral neck      History of urinary incontinence      Malignant neoplasm of head, neck and face      History of appendectomy      H/O colonoscopy  x2      H/O endoscopy          Review of Systems:  Contained within HPI.    MEDICATIONS  (STANDING):  heparin   Injectable 5000 Unit(s) SubCutaneous every 8 hours  influenza  Vaccine (HIGH DOSE) 0.5 milliLiter(s) IntraMuscular once  pantoprazole  Injectable 40 milliGRAM(s) IV Push daily    MEDICATIONS  (PRN):  ondansetron Injectable 4 milliGRAM(s) IV Push every 8 hours PRN Nausea and/or Vomiting      Allergies    No Known Drug Allergies  pollen...sneezing; runny nose. (Other)    Intolerances        SOCIAL HISTORY       FAMILY HISTORY:  No pertinent family history in first degree relatives        Vital Signs Last 24 Hrs  T(C): 36.8 (25 Sep 2024 12:02), Max: 36.8 (25 Sep 2024 12:02)  T(F): 98.3 (25 Sep 2024 12:02), Max: 98.3 (25 Sep 2024 12:02)  HR: 67 (25 Sep 2024 12:02) (64 - 69)  BP: 134/72 (25 Sep 2024 12:02) (134/72 - 161/72)  BP(mean): --  RR: 18 (25 Sep 2024 12:02) (18 - 18)  SpO2: 95% (25 Sep 2024 12:02) (95% - 97%)    Parameters below as of 25 Sep 2024 12:02  Patient On (Oxygen Delivery Method): room air        Physical Exam:    General:  Pt is sitting in bed in no obvious signs of distress. Appears stated age, well-groomed, well-nourished.   Chest:  bilateral chest rise and fall  Cardiovascular:  not tachycardiac  Abdomen:  soft and non distended, non tender, no guarding.   Neuro/Psych:  Alert, oriented to time, place and person       LABS:                        14.3   9.78  )-----------( 209      ( 25 Sep 2024 06:54 )             43.1     09-25    141  |  104  |  14  ----------------------------<  102[H]  3.9   |  30  |  0.86    Ca    9.1      25 Sep 2024 06:54  Mg     2.0     09-25    TPro  6.4  /  Alb  3.4  /  TBili  0.8  /  DBili  x   /  AST  15  /  ALT  16  /  AlkPhos  91  09-25    PT/INR - ( 24 Sep 2024 19:58 )   PT: 12.3 sec;   INR: 1.04 ratio         PTT - ( 24 Sep 2024 19:58 )  PTT:30.7 sec  Urinalysis Basic - ( 25 Sep 2024 06:54 )    Color: x / Appearance: x / SG: x / pH: x  Gluc: 102 mg/dL / Ketone: x  / Bili: x / Urobili: x   Blood: x / Protein: x / Nitrite: x   Leuk Esterase: x / RBC: x / WBC x   Sq Epi: x / Non Sq Epi: x / Bacteria: x        RADIOLOGY & ADDITIONAL STUDIES:

## 2024-09-25 NOTE — PROGRESS NOTE ADULT - ASSESSMENT
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     #Abdominal pain   #SBO  - CTAP - Small bowel obstruction with transition point in the lower central abdomen.  - no episodes of emesis currently, hold off NGT   - d/c IVF, advanced to clears   - zofran prn   - Surgery Dr Flaherty consulted by ED     #Leukocytosis; resolved   - reactive   - UA negative     #DVTppx - heparin    Dispo: pending sx consult, advancement of diet    9/25 Family wife and daughter Jc (First contact)  [phone] updated.   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     #Abdominal pain   #SBO  - CTAP - Small bowel obstruction with transition point in the lower central abdomen.  - no episodes of emesis currently, hold off NGT   - d/c IVF, advanced to clears   - zofran prn   - Surgery Dr Flaherty consulted by ED     #Leukocytosis; resolved   - reactive   - UA negative     #DVTppx - heparin    Dispo: pending sx consult, advancement of diet    9/25 Family wife and daughter Jc (First contact)  [phone] attempted to call no response    Second emergency contact is Nemesio son [phone] who is called and updated

## 2024-09-25 NOTE — CONSULT NOTE ADULT - ASSESSMENT
79M no PMHx and PSHx appendectomy who presents with abdominal pain. Pt was traveling home from [place] when he developed abdominal pain described as severe after eating with distension. Reports vomiting twice in the airport and having a bowel movement on . Once arriving in the ED reports pain resolved. In ED, CT performed and demonstrated "Small bowel obstruction with transition point in the lower central abdomen".  Surgery consulted for CT results. History of two colonoscopies with normal results. Pt tolerated hospital lunch and had a BM on . Describes the BM as regular with no blood or loose consistency. Reports no further nausea or vomiting. Vitals wnl. CBC, CMP, UA wnl.     Likely gastroenteritis secondary to viral illness or food.     Plan:  No surgical indication at this time  Continue Regular Diet  Drink plenty of fluids    Patient seen and examined with Dr. Flaherty  Patient may follow up with his primary  provider after discharge  Please Re Consult Surgery Team w/ any further concerns or questions.

## 2024-09-25 NOTE — DISCHARGE NOTE NURSING/CASE MANAGEMENT/SOCIAL WORK - NSDCPEFALRISK_GEN_ALL_CORE
For information on Fall & Injury Prevention, visit: [email or web address] OR  [email or web address] OR  https://www.cdc.gov/steadi/patient.html

## 2024-09-25 NOTE — DISCHARGE NOTE PROVIDER - HOSPITAL COURSE
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 Pt was returning home from [place] after vacation and while on the plane developed severe diffuse abd pain associated with nausea and some dry heaves, abdominal bloating. In the ED you were found to have a small bowel obstruction. No surgical intervention was required. Patient nausea and vomiting self resolved. Patient was able to tolerate oral intake. Please follow up with your outpatient provider. __     You will need to follow up with your primary care physician.      Discharging Provider:    Contact Info: Cell [phone] - Please call with any questions or concerns.    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 Pt was returning home from [place] after vacation and while on the plane developed severe diffuse abd pain associated with nausea and some dry heaves, abdominal bloating. In the ED you were found to have a small bowel obstruction. No surgical intervention was required. Patient nausea and vomiting self resolved. Patient was able to tolerate oral intake. Surgery saw patient and believed this to be a bout of gastroenteritis. Please follow up with your outpatient provider/surgeon in 2 weeks.     You will need to follow up with your primary care physician.      Discharging Provider:  Sammy Fletcher NP   Contact Info: Cell [phone] - Please call with any questions or concerns.    Outpatient Provider: Dr. Mendes

## 2024-09-30 ENCOUNTER — APPOINTMENT (OUTPATIENT)
Dept: FAMILY MEDICINE | Facility: CLINIC | Age: 79
End: 2024-09-30
Payer: MEDICARE

## 2024-09-30 VITALS
HEART RATE: 79 BPM | BODY MASS INDEX: 22.88 KG/M2 | RESPIRATION RATE: 16 BRPM | HEIGHT: 68 IN | DIASTOLIC BLOOD PRESSURE: 70 MMHG | TEMPERATURE: 97.3 F | OXYGEN SATURATION: 99 % | SYSTOLIC BLOOD PRESSURE: 130 MMHG | WEIGHT: 151 LBS

## 2024-09-30 DIAGNOSIS — R00.2 PALPITATIONS: ICD-10-CM

## 2024-09-30 DIAGNOSIS — I25.10 ATHEROSCLEROTIC HEART DISEASE OF NATIVE CORONARY ARTERY W/OUT ANGINA PECTORIS: ICD-10-CM

## 2024-09-30 DIAGNOSIS — D72.829 ELEVATED WHITE BLOOD CELL COUNT, UNSPECIFIED: ICD-10-CM

## 2024-09-30 DIAGNOSIS — Z09 ENCOUNTER FOR FOLLOW-UP EXAMINATION AFTER COMPLETED TREATMENT FOR CONDITIONS OTHER THAN MALIGNANT NEOPLASM: ICD-10-CM

## 2024-09-30 DIAGNOSIS — K56.609 UNSPECIFIED INTESTINAL OBSTRUCTION, UNSPECIFIED AS TO PARTIAL VERSUS COMPLETE OBSTRUCTION: ICD-10-CM

## 2024-09-30 PROCEDURE — 36415 COLL VENOUS BLD VENIPUNCTURE: CPT

## 2024-09-30 PROCEDURE — 99496 TRANSJ CARE MGMT HIGH F2F 7D: CPT

## 2024-09-30 NOTE — PHYSICAL EXAM
[No Acute Distress] : no acute distress [No Edema] : there was no peripheral edema [Alert and Oriented x3] : oriented to person, place, and time [Normal] : affect was normal and insight and judgment were intact [14005 - High Complexity requires an extensive number of possible diagnoses and/or the management options, extensive complexity of the medical data (tests, etc.) to be reviewed, and a high risk of significant complications, morbidity, and/or mortality as w] : High Complexity

## 2024-09-30 NOTE — HISTORY OF PRESENT ILLNESS
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 Pt was returning home from [place] after vacation and while on the plane developed severe diffuse abd pain associated with nausea and some dry heaves, abdominal bloating. In the ED you were found to have a small bowel obstruction. No surgical intervention was required. Patient nausea and vomiting self resolved. Patient was able to tolerate oral intake. Surgery saw patient and believed this to be a bout of gastroenteritis. Please follow up with your outpatient provider/surgeon. today Pt denies CP,no Abd pain, no fevr, no chills, no N,no V< no diarrhea. Hospital work up d/w the pt at [facility]

## 2024-09-30 NOTE — HISTORY OF PRESENT ILLNESS
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 Pt was returning home from [place] after vacation and while on the plane developed severe diffuse abd pain associated with nausea and some dry heaves, abdominal bloating. In the ED you were found to have a small bowel obstruction. No surgical intervention was required. Patient nausea and vomiting self resolved. Patient was able to tolerate oral intake. Surgery saw patient and believed this to be a bout of gastroenteritis. Please follow up with your outpatient provider/surgeon. today Pt denies CP,no Abd pain, no fevr, no chills, no N,no V< no diarrhea. Hospital work up d/w the pt at [facility]

## 2024-09-30 NOTE — COUNSELING
[Fall prevention counseling provided] : Fall prevention counseling provided [Adequate lighting] : Adequate lighting [No throw rugs] : No throw rugs [Use proper foot wear] : Use proper foot wear [Use recommended devices] : Use recommended devices [Good understanding] : Patient has a good understanding of lifestyle changes and steps needed to achieve self management goal Alert and oriented to person, place, time/situation. normal mood and affect. no apparent risk to self or others.

## 2024-09-30 NOTE — PHYSICAL EXAM
[No Acute Distress] : no acute distress [No Edema] : there was no peripheral edema [Alert and Oriented x3] : oriented to person, place, and time [Normal] : affect was normal and insight and judgment were intact [66180 - High Complexity requires an extensive number of possible diagnoses and/or the management options, extensive complexity of the medical data (tests, etc.) to be reviewed, and a high risk of significant complications, morbidity, and/or mortality as w] : High Complexity

## 2024-09-30 NOTE — HEALTH RISK ASSESSMENT
[No] : No [With Patient/Caregiver] : , with patient/caregiver [Aggressive treatment] : aggressive treatment [Never] : Never [NO] : No [Change in mental status noted] : No change in mental status noted [Language] : denies difficulty with language [With Family] : lives with family [Employed] : employed [] :  [# Of Children ___] : has [unfilled] children [Feels Safe at Home] : Feels safe at home [Fully functional (bathing, dressing, toileting, transferring, walking, feeding)] : Fully functional (bathing, dressing, toileting, transferring, walking, feeding) [Fully functional (using the telephone, shopping, preparing meals, housekeeping, doing laundry, using] : Fully functional and needs no help or supervision to perform IADLs (using the telephone, shopping, preparing meals, housekeeping, doing laundry, using transportation, managing medications and managing finances) [Smoke Detector] : smoke detector [Carbon Monoxide Detector] : carbon monoxide detector [Seat Belt] :  uses seat belt [Sunscreen] : uses sunscreen [AdvancecareDate] : 09/24

## 2024-10-02 LAB
ALBUMIN SERPL ELPH-MCNC: 4.7 G/DL
ALP BLD-CCNC: 104 U/L
ALT SERPL-CCNC: 24 U/L
ANION GAP SERPL CALC-SCNC: 12 MMOL/L
AST SERPL-CCNC: 24 U/L
BASOPHILS # BLD AUTO: 0.05 K/UL
BASOPHILS NFR BLD AUTO: 0.7 %
BILIRUB SERPL-MCNC: 0.5 MG/DL
BUN SERPL-MCNC: 15 MG/DL
CALCIUM SERPL-MCNC: 9.7 MG/DL
CHLORIDE SERPL-SCNC: 102 MMOL/L
CO2 SERPL-SCNC: 26 MMOL/L
CREAT SERPL-MCNC: 1.19 MG/DL
EGFR: 63 ML/MIN/1.73M2
EOSINOPHIL # BLD AUTO: 0.12 K/UL
EOSINOPHIL NFR BLD AUTO: 1.6 %
GLUCOSE SERPL-MCNC: 110 MG/DL
HCT VFR BLD CALC: 47.8 %
HGB BLD-MCNC: 15.9 G/DL
IMM GRANULOCYTES NFR BLD AUTO: 0.1 %
LYMPHOCYTES # BLD AUTO: 1.7 K/UL
LYMPHOCYTES NFR BLD AUTO: 23.4 %
MAN DIFF?: NORMAL
MCHC RBC-ENTMCNC: 32.6 PG
MCHC RBC-ENTMCNC: 33.3 GM/DL
MCV RBC AUTO: 98.2 FL
MONOCYTES # BLD AUTO: 0.65 K/UL
MONOCYTES NFR BLD AUTO: 8.9 %
NEUTROPHILS # BLD AUTO: 4.75 K/UL
NEUTROPHILS NFR BLD AUTO: 65.3 %
PLATELET # BLD AUTO: 237 K/UL
POTASSIUM SERPL-SCNC: 4.8 MMOL/L
PROT SERPL-MCNC: 7.3 G/DL
RBC # BLD: 4.87 M/UL
RBC # FLD: 12.7 %
SODIUM SERPL-SCNC: 140 MMOL/L
WBC # FLD AUTO: 7.28 K/UL

## 2024-10-15 PROCEDURE — 80053 COMPREHEN METABOLIC PANEL: CPT

## 2024-10-15 PROCEDURE — 86900 BLOOD TYPING SEROLOGIC ABO: CPT

## 2024-10-15 PROCEDURE — 36415 COLL VENOUS BLD VENIPUNCTURE: CPT

## 2024-10-15 PROCEDURE — 74177 CT ABD & PELVIS W/CONTRAST: CPT | Mod: MC

## 2024-10-15 PROCEDURE — 99285 EMERGENCY DEPT VISIT HI MDM: CPT | Mod: 25

## 2024-10-15 PROCEDURE — 83690 ASSAY OF LIPASE: CPT

## 2024-10-15 PROCEDURE — 81001 URINALYSIS AUTO W/SCOPE: CPT

## 2024-10-15 PROCEDURE — 85730 THROMBOPLASTIN TIME PARTIAL: CPT

## 2024-10-15 PROCEDURE — 86850 RBC ANTIBODY SCREEN: CPT

## 2024-10-15 PROCEDURE — 86901 BLOOD TYPING SEROLOGIC RH(D): CPT

## 2024-10-15 PROCEDURE — 93005 ELECTROCARDIOGRAM TRACING: CPT

## 2024-10-15 PROCEDURE — 71045 X-RAY EXAM CHEST 1 VIEW: CPT

## 2024-10-15 PROCEDURE — 83605 ASSAY OF LACTIC ACID: CPT

## 2024-10-15 PROCEDURE — 85025 COMPLETE CBC W/AUTO DIFF WBC: CPT

## 2024-10-15 PROCEDURE — 83735 ASSAY OF MAGNESIUM: CPT

## 2024-10-15 PROCEDURE — 85610 PROTHROMBIN TIME: CPT

## 2024-10-23 ENCOUNTER — APPOINTMENT (OUTPATIENT)
Dept: FAMILY MEDICINE | Facility: CLINIC | Age: 79
End: 2024-10-23
Payer: MEDICARE

## 2024-10-23 VITALS
DIASTOLIC BLOOD PRESSURE: 66 MMHG | TEMPERATURE: 97.3 F | HEART RATE: 73 BPM | WEIGHT: 150 LBS | BODY MASS INDEX: 22.73 KG/M2 | OXYGEN SATURATION: 98 % | RESPIRATION RATE: 14 BRPM | SYSTOLIC BLOOD PRESSURE: 126 MMHG | HEIGHT: 68 IN

## 2024-10-23 DIAGNOSIS — C09.9 MALIGNANT NEOPLASM OF TONSIL, UNSPECIFIED: ICD-10-CM

## 2024-10-23 DIAGNOSIS — R22.1 LOCALIZED SWELLING, MASS AND LUMP, NECK: ICD-10-CM

## 2024-10-23 DIAGNOSIS — M54.6 PAIN IN THORACIC SPINE: ICD-10-CM

## 2024-10-23 PROCEDURE — 99215 OFFICE O/P EST HI 40 MIN: CPT

## 2024-10-23 PROCEDURE — G2211 COMPLEX E/M VISIT ADD ON: CPT

## 2024-10-28 ENCOUNTER — APPOINTMENT (OUTPATIENT)
Dept: CT IMAGING | Facility: HOSPITAL | Age: 79
End: 2024-10-28

## 2024-10-28 ENCOUNTER — OUTPATIENT (OUTPATIENT)
Dept: OUTPATIENT SERVICES | Facility: HOSPITAL | Age: 79
LOS: 1 days | End: 2024-10-28
Payer: MEDICARE

## 2024-10-28 DIAGNOSIS — Z98.890 OTHER SPECIFIED POSTPROCEDURAL STATES: Chronic | ICD-10-CM

## 2024-10-28 DIAGNOSIS — R22.1 LOCALIZED SWELLING, MASS AND LUMP, NECK: ICD-10-CM

## 2024-10-28 DIAGNOSIS — C09.9 MALIGNANT NEOPLASM OF TONSIL, UNSPECIFIED: ICD-10-CM

## 2024-10-28 DIAGNOSIS — Z90.49 ACQUIRED ABSENCE OF OTHER SPECIFIED PARTS OF DIGESTIVE TRACT: Chronic | ICD-10-CM

## 2024-10-28 PROCEDURE — 70491 CT SOFT TISSUE NECK W/DYE: CPT | Mod: 26,MH

## 2024-11-07 DIAGNOSIS — R59.9 ENLARGED LYMPH NODES, UNSPECIFIED: ICD-10-CM

## 2024-11-12 ENCOUNTER — APPOINTMENT (OUTPATIENT)
Dept: OTOLARYNGOLOGY | Facility: CLINIC | Age: 79
End: 2024-11-12
Payer: MEDICARE

## 2024-11-12 ENCOUNTER — RESULT REVIEW (OUTPATIENT)
Age: 79
End: 2024-11-12

## 2024-11-12 VITALS
HEIGHT: 68 IN | OXYGEN SATURATION: 97 % | DIASTOLIC BLOOD PRESSURE: 75 MMHG | BODY MASS INDEX: 22.73 KG/M2 | TEMPERATURE: 98.5 F | SYSTOLIC BLOOD PRESSURE: 138 MMHG | WEIGHT: 150 LBS

## 2024-11-12 PROCEDURE — 99214 OFFICE O/P EST MOD 30 MIN: CPT | Mod: 25

## 2024-11-12 PROCEDURE — 31575 DIAGNOSTIC LARYNGOSCOPY: CPT

## 2024-11-20 PROCEDURE — 70491 CT SOFT TISSUE NECK W/DYE: CPT

## 2024-11-21 ENCOUNTER — RESULT REVIEW (OUTPATIENT)
Age: 79
End: 2024-11-21

## 2024-11-21 ENCOUNTER — APPOINTMENT (OUTPATIENT)
Dept: ULTRASOUND IMAGING | Facility: IMAGING CENTER | Age: 79
End: 2024-11-21
Payer: MEDICARE

## 2024-11-21 ENCOUNTER — OUTPATIENT (OUTPATIENT)
Dept: OUTPATIENT SERVICES | Facility: HOSPITAL | Age: 79
LOS: 1 days | End: 2024-11-21
Payer: MEDICARE

## 2024-11-21 DIAGNOSIS — Z90.49 ACQUIRED ABSENCE OF OTHER SPECIFIED PARTS OF DIGESTIVE TRACT: Chronic | ICD-10-CM

## 2024-11-21 DIAGNOSIS — R59.9 ENLARGED LYMPH NODES, UNSPECIFIED: ICD-10-CM

## 2024-11-21 DIAGNOSIS — Z98.890 OTHER SPECIFIED POSTPROCEDURAL STATES: Chronic | ICD-10-CM

## 2024-11-21 PROCEDURE — 88305 TISSUE EXAM BY PATHOLOGIST: CPT | Mod: 26

## 2024-11-21 PROCEDURE — 88360 TUMOR IMMUNOHISTOCHEM/MANUAL: CPT

## 2024-11-21 PROCEDURE — 88172 CYTP DX EVAL FNA 1ST EA SITE: CPT

## 2024-11-21 PROCEDURE — 88365 INSITU HYBRIDIZATION (FISH): CPT | Mod: 26

## 2024-11-21 PROCEDURE — 88342 IMHCHEM/IMCYTCHM 1ST ANTB: CPT | Mod: 26,59

## 2024-11-21 PROCEDURE — 88305 TISSUE EXAM BY PATHOLOGIST: CPT

## 2024-11-21 PROCEDURE — 10006 FNA BX W/US GDN EA ADDL: CPT

## 2024-11-21 PROCEDURE — 10005 FNA BX W/US GDN 1ST LES: CPT

## 2024-11-21 PROCEDURE — 88342 IMHCHEM/IMCYTCHM 1ST ANTB: CPT

## 2024-11-21 PROCEDURE — 88173 CYTOPATH EVAL FNA REPORT: CPT | Mod: 26

## 2024-11-21 PROCEDURE — 88173 CYTOPATH EVAL FNA REPORT: CPT

## 2024-11-21 PROCEDURE — 88360 TUMOR IMMUNOHISTOCHEM/MANUAL: CPT | Mod: 26

## 2024-11-21 PROCEDURE — 88341 IMHCHEM/IMCYTCHM EA ADD ANTB: CPT

## 2024-11-21 PROCEDURE — 88365 INSITU HYBRIDIZATION (FISH): CPT

## 2024-12-03 ENCOUNTER — APPOINTMENT (OUTPATIENT)
Dept: OTOLARYNGOLOGY | Facility: CLINIC | Age: 79
End: 2024-12-03
Payer: MEDICARE

## 2024-12-03 VITALS
WEIGHT: 150 LBS | TEMPERATURE: 98.2 F | SYSTOLIC BLOOD PRESSURE: 154 MMHG | HEIGHT: 68 IN | DIASTOLIC BLOOD PRESSURE: 74 MMHG | BODY MASS INDEX: 22.73 KG/M2 | HEART RATE: 83 BPM

## 2024-12-03 PROCEDURE — 31575 DIAGNOSTIC LARYNGOSCOPY: CPT

## 2024-12-03 PROCEDURE — 99215 OFFICE O/P EST HI 40 MIN: CPT | Mod: 25

## 2024-12-09 ENCOUNTER — NON-APPOINTMENT (OUTPATIENT)
Age: 79
End: 2024-12-09

## 2024-12-09 ENCOUNTER — APPOINTMENT (OUTPATIENT)
Dept: FAMILY MEDICINE | Facility: CLINIC | Age: 79
End: 2024-12-09
Payer: MEDICARE

## 2024-12-09 VITALS
DIASTOLIC BLOOD PRESSURE: 74 MMHG | OXYGEN SATURATION: 100 % | HEART RATE: 63 BPM | SYSTOLIC BLOOD PRESSURE: 134 MMHG | BODY MASS INDEX: 22.73 KG/M2 | TEMPERATURE: 97.3 F | WEIGHT: 150 LBS | RESPIRATION RATE: 14 BRPM | HEIGHT: 68 IN

## 2024-12-09 DIAGNOSIS — I25.10 ATHEROSCLEROTIC HEART DISEASE OF NATIVE CORONARY ARTERY W/OUT ANGINA PECTORIS: ICD-10-CM

## 2024-12-09 DIAGNOSIS — R59.9 ENLARGED LYMPH NODES, UNSPECIFIED: ICD-10-CM

## 2024-12-09 DIAGNOSIS — Z01.818 ENCOUNTER FOR OTHER PREPROCEDURAL EXAMINATION: ICD-10-CM

## 2024-12-09 PROCEDURE — 99215 OFFICE O/P EST HI 40 MIN: CPT

## 2024-12-09 PROCEDURE — G2211 COMPLEX E/M VISIT ADD ON: CPT

## 2024-12-09 PROCEDURE — 36415 COLL VENOUS BLD VENIPUNCTURE: CPT

## 2024-12-09 PROCEDURE — 93000 ELECTROCARDIOGRAM COMPLETE: CPT

## 2024-12-10 LAB
ALBUMIN SERPL ELPH-MCNC: 4.7 G/DL
ALP BLD-CCNC: 115 U/L
ALT SERPL-CCNC: 9 U/L
ANION GAP SERPL CALC-SCNC: 12 MMOL/L
APPEARANCE: CLEAR
APTT BLD: 30.8 SEC
AST SERPL-CCNC: 17 U/L
BASOPHILS # BLD AUTO: 0.05 K/UL
BASOPHILS NFR BLD AUTO: 0.8 %
BILIRUB SERPL-MCNC: 0.6 MG/DL
BILIRUBIN URINE: NEGATIVE
BLOOD URINE: NEGATIVE
BUN SERPL-MCNC: 17 MG/DL
CALCIUM SERPL-MCNC: 9.9 MG/DL
CHLORIDE SERPL-SCNC: 103 MMOL/L
CO2 SERPL-SCNC: 25 MMOL/L
COLOR: YELLOW
CREAT SERPL-MCNC: 0.98 MG/DL
EGFR: 79 ML/MIN/1.73M2
EOSINOPHIL # BLD AUTO: 0.08 K/UL
EOSINOPHIL NFR BLD AUTO: 1.2 %
GLUCOSE QUALITATIVE U: NEGATIVE MG/DL
GLUCOSE SERPL-MCNC: 97 MG/DL
HCT VFR BLD CALC: 48 %
HGB BLD-MCNC: 16 G/DL
IMM GRANULOCYTES NFR BLD AUTO: 0.2 %
INR PPP: 0.97 RATIO
KETONES URINE: NEGATIVE MG/DL
LEUKOCYTE ESTERASE URINE: NEGATIVE
LYMPHOCYTES # BLD AUTO: 1.41 K/UL
LYMPHOCYTES NFR BLD AUTO: 21.5 %
MAN DIFF?: NORMAL
MCHC RBC-ENTMCNC: 31.4 PG
MCHC RBC-ENTMCNC: 33.3 G/DL
MCV RBC AUTO: 94.3 FL
MONOCYTES # BLD AUTO: 0.56 K/UL
MONOCYTES NFR BLD AUTO: 8.5 %
NEUTROPHILS # BLD AUTO: 4.46 K/UL
NEUTROPHILS NFR BLD AUTO: 67.8 %
NITRITE URINE: NEGATIVE
PH URINE: 6.5
PLATELET # BLD AUTO: 242 K/UL
POTASSIUM SERPL-SCNC: 5 MMOL/L
PROT SERPL-MCNC: 7.6 G/DL
PROTEIN URINE: NEGATIVE MG/DL
PT BLD: 11.6 SEC
RBC # BLD: 5.09 M/UL
RBC # FLD: 12.8 %
SODIUM SERPL-SCNC: 141 MMOL/L
SPECIFIC GRAVITY URINE: 1.01
TSH SERPL-ACNC: 1.57 UIU/ML
UROBILINOGEN URINE: 0.2 MG/DL
WBC # FLD AUTO: 6.57 K/UL

## 2025-01-03 VITALS
SYSTOLIC BLOOD PRESSURE: 152 MMHG | RESPIRATION RATE: 16 BRPM | HEART RATE: 80 BPM | TEMPERATURE: 97 F | OXYGEN SATURATION: 97 % | WEIGHT: 145.95 LBS | HEIGHT: 67 IN | DIASTOLIC BLOOD PRESSURE: 79 MMHG

## 2025-01-03 NOTE — PATIENT PROFILE ADULT - FALL HARM RISK - UNIVERSAL INTERVENTIONS
Bed in lowest position, wheels locked, appropriate side rails in place/Call bell, personal items and telephone in reach/Instruct patient to call for assistance before getting out of bed or chair/Non-slip footwear when patient is out of bed/[place] to call system/Physically safe environment - no spills, clutter or unnecessary equipment/Purposeful Proactive Rounding/Room/bathroom lighting operational, light cord in reach

## 2025-01-03 NOTE — PATIENT PROFILE ADULT - FUNCTIONAL ASSESSMENT - DAILY ACTIVITY 5.
Post-Op Assessment Note    CV Status:  Stable  Pain Score: 0    Pain management: adequate     Mental Status:  Sleepy   Hydration Status:  Stable   PONV Controlled:  None   Airway Patency:  Patent      Post Op Vitals Reviewed: Yes      Staff: CRNA         No notable events documented.     BP   106/78   Temp      Pulse 80 (10/06/23 1240)   Resp 16 (10/06/23 1240)    SpO2 100 % (10/06/23 1240) 4 = No assist / stand by assistance

## 2025-01-06 ENCOUNTER — RESULT REVIEW (OUTPATIENT)
Age: 80
End: 2025-01-06

## 2025-01-06 ENCOUNTER — INPATIENT (INPATIENT)
Facility: HOSPITAL | Age: 80
LOS: 2 days | Discharge: HOME | End: 2025-01-09
Attending: OTOLARYNGOLOGY | Admitting: OTOLARYNGOLOGY
Payer: MEDICARE

## 2025-01-06 ENCOUNTER — APPOINTMENT (OUTPATIENT)
Dept: OTOLARYNGOLOGY | Facility: HOSPITAL | Age: 80
End: 2025-01-06

## 2025-01-06 DIAGNOSIS — Z98.890 OTHER SPECIFIED POSTPROCEDURAL STATES: Chronic | ICD-10-CM

## 2025-01-06 DIAGNOSIS — Z90.49 ACQUIRED ABSENCE OF OTHER SPECIFIED PARTS OF DIGESTIVE TRACT: Chronic | ICD-10-CM

## 2025-01-06 PROCEDURE — 38724 REMOVAL OF LYMPH NODES NECK: CPT | Mod: 50

## 2025-01-06 PROCEDURE — 88331 PATH CONSLTJ SURG 1 BLK 1SPC: CPT | Mod: 26

## 2025-01-06 PROCEDURE — 88342 IMHCHEM/IMCYTCHM 1ST ANTB: CPT | Mod: 26

## 2025-01-06 PROCEDURE — S2900 ROBOTIC SURGICAL SYSTEM: CPT | Mod: NC

## 2025-01-06 PROCEDURE — 88305 TISSUE EXAM BY PATHOLOGIST: CPT | Mod: 26

## 2025-01-06 PROCEDURE — 88309 TISSUE EXAM BY PATHOLOGIST: CPT | Mod: 26

## 2025-01-06 PROCEDURE — 41120 PARTIAL REMOVAL OF TONGUE: CPT

## 2025-01-06 DEVICE — CLIP APPLIER ETHICON LIGACLIP 9 3/8" SMALL: Type: IMPLANTABLE DEVICE | Status: FUNCTIONAL

## 2025-01-06 DEVICE — CLIP APPLIER ETHICON LIGACLIP 11.5" MEDIUM: Type: IMPLANTABLE DEVICE | Status: FUNCTIONAL

## 2025-01-06 RX ORDER — CHLORHEXIDINE GLUCONATE 1.2 MG/ML
15 RINSE ORAL
Refills: 0 | Status: DISCONTINUED | OUTPATIENT
Start: 2025-01-06 | End: 2025-01-09

## 2025-01-06 RX ORDER — ACETAMINOPHEN 80 MG/.8ML
975 SOLUTION/ DROPS ORAL EVERY 6 HOURS
Refills: 0 | Status: DISCONTINUED | OUTPATIENT
Start: 2025-01-06 | End: 2025-01-09

## 2025-01-06 RX ORDER — OXYCODONE HCL 15 MG
1 TABLET ORAL
Qty: 40 | Refills: 0
Start: 2025-01-06 | End: 2025-01-15

## 2025-01-06 RX ORDER — OXYCODONE HCL 15 MG
5 TABLET ORAL EVERY 4 HOURS
Refills: 0 | Status: DISCONTINUED | OUTPATIENT
Start: 2025-01-06 | End: 2025-01-09

## 2025-01-06 RX ORDER — OXYCODONE HCL 15 MG
5 TABLET ORAL EVERY 4 HOURS
Refills: 0 | Status: DISCONTINUED | OUTPATIENT
Start: 2025-01-06 | End: 2025-01-06

## 2025-01-06 RX ORDER — GINKGO BILOBA 40 MG
1 CAPSULE ORAL AT BEDTIME
Refills: 0 | Status: DISCONTINUED | OUTPATIENT
Start: 2025-01-06 | End: 2025-01-09

## 2025-01-06 RX ORDER — OXYCODONE HCL 15 MG
5 TABLET ORAL
Qty: 200 | Refills: 0
Start: 2025-01-06 | End: 2025-01-15

## 2025-01-06 RX ORDER — ONDANSETRON 4 MG/1
4 TABLET ORAL EVERY 8 HOURS
Refills: 0 | Status: DISCONTINUED | OUTPATIENT
Start: 2025-01-06 | End: 2025-01-09

## 2025-01-06 RX ORDER — OXYCODONE HCL 15 MG
10 TABLET ORAL EVERY 4 HOURS
Refills: 0 | Status: DISCONTINUED | OUTPATIENT
Start: 2025-01-06 | End: 2025-01-09

## 2025-01-06 RX ORDER — OXYCODONE HCL 15 MG
10 TABLET ORAL EVERY 4 HOURS
Refills: 0 | Status: DISCONTINUED | OUTPATIENT
Start: 2025-01-06 | End: 2025-01-06

## 2025-01-06 RX ORDER — DEXAMETHASONE SODIUM PHOSPHATE 4 MG/ML
10 VIAL (ML) INJECTION EVERY 8 HOURS
Refills: 0 | Status: COMPLETED | OUTPATIENT
Start: 2025-01-06 | End: 2025-01-07

## 2025-01-06 RX ORDER — SODIUM CHLORIDE 9 MG/ML
1000 INJECTION, SOLUTION INTRAVENOUS
Refills: 0 | Status: DISCONTINUED | OUTPATIENT
Start: 2025-01-06 | End: 2025-01-07

## 2025-01-06 RX ADMIN — Medication 102 MILLIGRAM(S): at 23:09

## 2025-01-06 RX ADMIN — CHLORHEXIDINE GLUCONATE 15 MILLILITER(S): 1.2 RINSE ORAL at 18:03

## 2025-01-06 RX ADMIN — SODIUM CHLORIDE 75 MILLILITER(S): 9 INJECTION, SOLUTION INTRAVENOUS at 18:03

## 2025-01-06 RX ADMIN — ACETAMINOPHEN 975 MILLIGRAM(S): 80 SOLUTION/ DROPS ORAL at 18:03

## 2025-01-06 RX ADMIN — ACETAMINOPHEN 975 MILLIGRAM(S): 80 SOLUTION/ DROPS ORAL at 23:31

## 2025-01-06 NOTE — H&P ADULT - NSHPPHYSICALEXAM_GEN_ALL_CORE
Gen: recovering from anesthesia  OC/OP: no bleeding  Neck: bilateral incisions with steri strips, soft, flat, NICK x2

## 2025-01-06 NOTE — H&P ADULT - ASSESSMENT
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    - admit to regional  - CLD as tolerated  - pain/nausea control  - peridex  - decadron x24hrs  - monitor NICK drains  - ambulate/OOB  - SCDs

## 2025-01-06 NOTE — ED ADULT NURSE NOTE - CAS EDN DISCHARGE INTERVENTIONS
IV discontinued, cath removed intact

## 2025-01-06 NOTE — PRE-ANESTHESIA EVALUATION ADULT - NSANTHADDINFOFT_GEN_ALL_CORE
[Authored: 06-Jan-2025 05:52]Outpatient Clinical Summary - Medical  Group [Charted Location: [place] [address]]- for Visit: [id number],  [Entered by: Ezra RTMARCELLUS (IS) 06-Jan-2025 05:52];  [Signed by: Provider, Outpatient (MD) 06-Jan-2025 05:52] General, Complete, Entered, Signed in Full, General      	[facility]		   Created Date 	01/06/2025 05:52AM		   Note 	This data is based on information in the electronic chart and is a snapshot as of Created Date.  Please note recent labs not yet reviewed by the ordering physician will not appear on this document.		  			   Patient Detail for 	JUANI WALKER 	MRN: 	[id number]  			   Contact 	JUANI WALKER 	Date of Birth: 	July 03,1945   Address 	[address] [place] [address]	Gender 	[place] [address]	Marital Status 	   Contact 	[phone](Home phone)	Allergies 	No  		Language 	English  			  	Reason for Referral		  	-No Referral Information Charted in outpatient electronic record		  			  	History of Present Illness		  			  	Problems		  	-Lymph nodes enlarged (785.6, R59.9) 07-Nov-2024 Status: Active		  	-Lymph nodes enlarged (785.6, R59.9) 07-Nov-2024 Status: Active		  	-Acute right-sided thoracic back pain (724.1, M54.6) 23-Oct-2024 Status: Active		  	-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, 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) 23-Oct-2024 Status: Active		  	-Leukocytosis, unspecified type (288.60, D72.829) 30-Sep-2024 Status: Active		  	-Hospital discharge follow-up (V67.59) 30-Sep-2024 Status: Active		  	-SBO (small bowel obstruction) (560.9, K56.609) 30-Sep-2024 Status: Active		  	-Right thyroid nodule (241.0, E04.1) 24-Apr-2024 Status: Active		  	-Overactive bladder (596.51, N32.81) 09-Feb-2024 Status: Active		  	-Chronic neck pain (723.1, 338.29, M54.2, G89.29) 09-Feb-2024 Status: Active		  	-Heart palpitations (785.1, R00.2) 06-Feb-2024 Status: Active		  	-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, 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) 28-Sep-2023 Status: Active		  	-Visual changes (368.9, H53.9) 14-Sep-2023 Status: Active		  	-Bilateral recurrent inguinal hernia without obstruction or gangrene (550.93, K40.21) 14-Sep-2023 Status: Active		  	-Gammopathy (273.9, D47.2) 17-Mar-2023 Status: Active		  	-Diarrhea, unspecified type (787.91, R19.7) 14-Feb-2023 Status: Active		  	-Diarrhea (787.91, R19.7) 07-Feb-2023 Status: Active		  	-Oropharyngeal cancer (146.9, C10.9) 16-Jan-2023 Status: Active		  	-Dyspnea on exertion (786.09, R06.09) 16-Apr-2022 Status: Active		  	-Chest pain, exertional (786.50, R07.9) 16-Apr-2022 Status: Active		  	-Coronary artery calcification (414.00, I25.10) 16-Apr-2022 Status: Active		  	-Pre-operative cardiovascular examination (V72.81, Z01.810) 16-Apr-2022 Status: Active		  	-Chest discomfort (786.59, R07.89) 14-Apr-2022 Status: Active		  	-Pre-op examination (V72.84, Z01.818) 11-Apr-2022 Status: Active		  	-Bilateral hearing loss (389.9, H91.93) 28-Jan-2022 Status: Active		  	-COVID-19 virus infection (079.89, U07.1) 25-Jan-2022 Status: Active		  	-Mass of lateral neck (784.2, R22.1) 03-Jan-2022 Status: Active		  	-Encounter for screening laboratory testing for COVID-19 virus (V01.79, Z11.52) 09-Aug-2021 Status: Active		  	-Elevated lipase (790.5, R74.8) 17-Dec-2020 Status: Active		  	-Need for pneumococcal vaccine (V03.82) 17-Dec-2020 Status: Active		  	-Urinary frequency (788.41, R35.0) 17-Dec-2020 Status: Active		  	-Clostridium difficile infection (041.84, A49.8) 11-Nov-2020 Status: Active		  	-Bloody diarrhea (787.91, R19.7) 10-Nov-2020 Status: Active		  	-Encounter for screening for other viral diseases (V73.89, Z11.59) 13-Oct-2020 Status: Active		  	-Acute bronchitis, unspecified organism (466.0, J20.9) 13-Oct-2020 Status: Active		  	-Bronchiectasis without complication (494.0, J47.9) 20-Feb-2020 Status: Active		  	-Abnormal chest CT (793.2, R93.89) 30-Jan-2020 Status: Active		  	-Abnormal thyroid ultrasound (794.5, R93.89) 30-Jan-2020 Status: Active		  	-Coronary arteriosclerosis (414.00, I25.10) 30-Jan-2020 Status: Active		  	-Thyroid nodule (241.0, E04.1) 28-Jan-2020 Status: Active		  	-Chronic cough (786.2, R05.3) 14-Aug-2019 Status: Active		  	-Lung nodule (793.11, R91.1) 08-Jan-2019 Status: Active		  	-Liver cyst (573.8, K76.89) 18-Dec-2018 Status: Active		  	-Acute URI (465.9, J06.9) 06-Dec-2018 Status: Active		  	-Contact dermatitis (692.9, L25.9) 06-Sep-2018 Status: Active		  	-Urinary incontinence (788.30) 15-Aug-2018 Status: Active		  	-Annual physical exam (V70.0, Z00.00) 15-Aug-2018 Status: Active		  	-Hip abductor tendinitis, left (726.5, M76.892) 06-Jul-2016 Status: Active		  	-Left groin pain (789.04, R10.32) 05-Jul-2016 Status: Active		  			  	Medications		  	-Myrbetriq 50 MG Oral Tablet Extended Release 24 HourTAKE 1 TABLET BY MOUTH DAILY Quantity: 90 Refills: 1 Ordered: [id number] POWELL MD Start : [id number]		  			  	Allergies and Adverse Reactions		  	-No Allergy Information Charted in outpatient electronic record		  			  	Past Medical History		  	-History of No significant past medical history Status: Resolved		  	-History of No significant past surgical history Status: Resolved		  	-History of abdominal pain (V13.89 Z87.898) Status: Resolved		  	-History of Abnormal CT of liver (793.3 R93.2) Status: Resolved		  	-History of Acute bronchitis with bronchiectasis (494.1 J47.0) Status: Resolved		  	-History of malignant neoplasm of neck (V10.89 Z85.89) Status: Resolved		  	-History of acute sinusitis (V12.69 Z87.09) Status: Resolved		  	-History of Acute URI (465.9 J06.9) Status: Resolved		  	-History of sore throat (V12.69 Z87.09) Status: Resolved		  			  	Procedures		  	-Procedure: Denies Inguinal Hernia Repair; Procedure Date: Not Available; Date Completed: Not Available; Status: Completed ;		  	-Procedure: Appendectomy; Procedure Date: Not Available; Date Completed: Not Available; Status: Completed ;		  			  	Immunization		  	-Pneumococcal polysaccharide vaccine, 23 valent on: 2020-12-17 Comments: Series: 		  			  	Family History		  	-No pertinent family history: Mother (V49.89, Z78.9) 05-Jul-2016 Status: Active 		  			  	Social History		  	-Rarely consumes alcohol (Z78.9) 11-Apr-2022 Status: Active 		  	-Does not use illicit drugs (Z78.9) 11-Apr-2022 Status: Active 		  	-Never a smoker 15-Aug-2018 Status: Active 		  	-Exercising regularly (more than 90 min/week) 05-Jul-2016 Status: Active 		  			  	Vital Signs (All Vital Signs for last  30 days of patient encounters)		  Date	Description	Test	Result  2024-12-09		Body surface area Derived from formula	1.81 m2  		Body mass index (BMI) [Ratio]	22.81 kg/m2  			150   		Body height	68 [in_us]  		Heart Rate	63 /min  			100   			0   		Respiratory rate	14 /min  		Body temperature	97.3 [degF]  		Systolic blood pressure	134 mm[Hg]  		Diastolic blood pressure	74 mm[Hg]  			  	Results (All Results for last 30 days of patient encounters)		  09-Dec-2024	Activated Partial Thromboplastin Time	Interpretation	  		Reference	  		Status	Complete  		Test Name	[id number]   		Value	30.8  			  09-Dec-2024	Prothrombin Time and INR, Plasma	Interpretation	  		Reference	  		Status	Complete  		Test Name	[id number]   		Value	0.97  			  09-Dec-2024	Urinalysis w/Reflex Urine Culture	Interpretation	  		Reference	  		Status	Complete  		Test Name	[id number]   		Value	Negative  			  09-Dec-2024	Thyroid Stimulating Hormone, Serum w/ FT4 Reflex	Interpretation	  		Reference	  		Status	Complete  		Test Name	[id number]   		Value	1.57  			  09-Dec-2024	Comprehensive Metabolic Panel	Interpretation	  		Reference	  		Status	Complete  		Test Name	[id number]   		Value	79  			  09-Dec-2024	CBC with Auto Diff	Interpretation	  		Reference	  		Status	Complete  		Test Name	[id number]   		Value	N/A  			  			  	Outstanding Orders		  	-No Treatment Plan Information Charted in outpatient electronic record		  			  	Advance Directives		  	-No Advance Directives Information Charted in outpatient electronic record		  			  	Healthcare Providers	Location 	Department   	FullName: HAO POWELL; Specialty:  	 [facility] MEDICINE  	FullName: TOYIN GAMINO; Specialty:  	 [address]	OTOLARYNGOLOGY  	FullName: [id number], GENERIC; Specialty:  	[facility] OTHER [facility]	CAT SCAN OUTPATIENT  	FullName: [id number], GENERIC; Specialty:  	[facility] OTHER OP [facility]	ULTRASOUND OUTPATIENT  	FullName: [id number], GENERIC; Specialty:  	[facility] OTHER [facility]	ULTRASOUND OUTPATIENT  	FullName: [id number], GENERIC; Specialty:  	[facility] OTHER [facility]	ULTRASOUND OUTPATIENT  	FullName: ELIE GONSALEZ; Specialty:  	 [facility]	OPHTHALMOLOGY  	FullName: [id number], GENERIC; Specialty:  	[facility] OTHER [facility]	MRI OUTPATIENT  	FullName: OSBALDO KERR; Specialty:  	[facility]	GENERAL SURGERY  	FullName: GER CLAUDIO; Specialty:  	 [facility]	PULMONARY MEDICINE  	FullName: MAGDIEL WHITEHEADBENJAMÍN; Specialty:  	[address] [facility] 	CARDIOLOGY GENERAL  	FullName: [id number], GENERIC; Specialty:  	 [facility]	ULTRASOUND OUTPATIENT  	FullName: [id number], GENERIC; Specialty:  	 [facility]	NUCLEAR MEDICINE OUTPATIENT  	FullName: [id number], GENERIC; Specialty:  	 [facility]	NUCLEAR MEDICINE OUTPATIENT  	FullName: JOSE MARIA GAGNON; Specialty:  	 [facility]	PULMONARY MEDICINE  	FullName: [id number], GENERIC; Specialty:  	[facility] OTHER [facility]	DX RADIOLOGY OUTPATIENT  	FullName: CORINNE LEWIS; Specialty:  	 [facility]	FAMILY MEDICINE  	FullName: [id number], GENERIC; Specialty:  	[facility] OTHER OP	DX RADIOLOGY OUTPATIENT  	FullName: MARY ROTH; Specialty:  	DO 10 [facility]	GENERAL SURGERY  	FullName: MIGUEL A VALERO; Specialty:  	 [facility]	ORTHOPEDIC SURGERY  			  	Patient Contacts

## 2025-01-06 NOTE — BRIEF OPERATIVE NOTE - NSICDXBRIEFPROCEDURE_GEN_ALL_CORE_FT
PROCEDURES:  Transoral robotic surgery (TORS) with resection of base of tongue 06-Jan-2025 12:41:16  Aman Goodman

## 2025-01-06 NOTE — H&P ADULT - HISTORY OF PRESENT ILLNESS
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

## 2025-01-07 LAB
ANION GAP SERPL CALC-SCNC: 7 MMOL/L — SIGNIFICANT CHANGE UP (ref 5–17)
BUN SERPL-MCNC: 15 MG/DL — SIGNIFICANT CHANGE UP (ref 7–23)
CALCIUM SERPL-MCNC: 9.1 MG/DL — SIGNIFICANT CHANGE UP (ref 8.4–10.5)
CHLORIDE SERPL-SCNC: 104 MMOL/L — SIGNIFICANT CHANGE UP (ref 96–108)
CO2 SERPL-SCNC: 27 MMOL/L — SIGNIFICANT CHANGE UP (ref 22–31)
CREAT SERPL-MCNC: 1.03 MG/DL — SIGNIFICANT CHANGE UP (ref 0.5–1.3)
EGFR: 74 ML/MIN/1.73M2 — SIGNIFICANT CHANGE UP
GLUCOSE SERPL-MCNC: 149 MG/DL — HIGH (ref 70–99)
HCT VFR BLD CALC: 41.4 % — SIGNIFICANT CHANGE UP (ref 39–50)
HGB BLD-MCNC: 14.3 G/DL — SIGNIFICANT CHANGE UP (ref 13–17)
MCHC RBC-ENTMCNC: 32.9 PG — SIGNIFICANT CHANGE UP (ref 27–34)
MCHC RBC-ENTMCNC: 34.5 G/DL — SIGNIFICANT CHANGE UP (ref 32–36)
MCV RBC AUTO: 95.2 FL — SIGNIFICANT CHANGE UP (ref 80–100)
NRBC # BLD: 0 /100 WBCS — SIGNIFICANT CHANGE UP (ref 0–0)
PLATELET # BLD AUTO: 319 K/UL — SIGNIFICANT CHANGE UP (ref 150–400)
POTASSIUM SERPL-MCNC: 5 MMOL/L — SIGNIFICANT CHANGE UP (ref 3.5–5.3)
POTASSIUM SERPL-SCNC: 5 MMOL/L — SIGNIFICANT CHANGE UP (ref 3.5–5.3)
RBC # BLD: 4.35 M/UL — SIGNIFICANT CHANGE UP (ref 4.2–5.8)
RBC # FLD: 12.3 % — SIGNIFICANT CHANGE UP (ref 10.3–14.5)
SODIUM SERPL-SCNC: 138 MMOL/L — SIGNIFICANT CHANGE UP (ref 135–145)
WBC # BLD: 15.53 K/UL — HIGH (ref 3.8–10.5)
WBC # FLD AUTO: 15.53 K/UL — HIGH (ref 3.8–10.5)

## 2025-01-07 RX ORDER — DEXAMETHASONE SODIUM PHOSPHATE 4 MG/ML
10 VIAL (ML) INJECTION ONCE
Refills: 0 | Status: COMPLETED | OUTPATIENT
Start: 2025-01-07 | End: 2025-01-07

## 2025-01-07 RX ADMIN — ACETAMINOPHEN 975 MILLIGRAM(S): 80 SOLUTION/ DROPS ORAL at 00:10

## 2025-01-07 RX ADMIN — ACETAMINOPHEN 975 MILLIGRAM(S): 80 SOLUTION/ DROPS ORAL at 23:02

## 2025-01-07 RX ADMIN — CHLORHEXIDINE GLUCONATE 15 MILLILITER(S): 1.2 RINSE ORAL at 17:02

## 2025-01-07 RX ADMIN — CHLORHEXIDINE GLUCONATE 15 MILLILITER(S): 1.2 RINSE ORAL at 06:35

## 2025-01-07 RX ADMIN — ACETAMINOPHEN 975 MILLIGRAM(S): 80 SOLUTION/ DROPS ORAL at 06:33

## 2025-01-07 RX ADMIN — ACETAMINOPHEN 975 MILLIGRAM(S): 80 SOLUTION/ DROPS ORAL at 17:02

## 2025-01-07 RX ADMIN — Medication 102 MILLIGRAM(S): at 06:34

## 2025-01-07 RX ADMIN — ACETAMINOPHEN 975 MILLIGRAM(S): 80 SOLUTION/ DROPS ORAL at 11:42

## 2025-01-07 RX ADMIN — Medication 102 MILLIGRAM(S): at 16:27

## 2025-01-07 NOTE — PROGRESS NOTE ADULT - SUBJECTIVE AND OBJECTIVE BOX
OTOLARYNGOLOGY (ENT) PROGRESS NOTE    PATIENT: JUANI WALKER     MRN: [id number]       : 47  ADMISSION:25  DATE OF SERVICE:  25 @ 06:51        Subjective/Interval:  - : LUISA, pt resting comfortably in bed this AM, pain well controlled. Would like to stay one more night before d/c.                     Objective:    Vital Signs:  T(C): 36.7 (25 @ 05:06), Max: 36.9 (25 @ 13:40)  HR: 83 (25 @ 05:06) (71 - 83)  BP: 120/62 (25 @ 05:06) (105/63 - 152/79)  RR: 17 (25 @ 05:06) (11 - 18)  SpO2: 94% (25 @ 05:06) (94% - 99%)    PHYSICAL EXAM:  General: NAD, A+Ox3  Respiratory: No respiratory distress, stridor, or stertor  Voice quality: normal  Face:  Symmetric without masses or lesions  OU: EOMI  Right: Pinna wnl  Left: Pinna wnl  Nose: nares wnl  OC/OP: floor of mouth WNL, no masses or lesions, OP clear  Neck: soft/flat, no LAD, b/l neck incision c/d/i intact, NICK drains bilaterally with serosang op      LABORATORY:                        14.3   15.53 )-----------( 319      ( 2025 05:30 )             41.4        138  |  104  |  15  ----------------------------<  149[H]  5.0   |  27  |  1.03    Ca    9.1      2025 05:30     [id number]    MICROBIOLOGY:      I&O:    25 @ 07:01  -  25 @ 06:51  --------------------------------------------------------  IN: 1300 mL / OUT: 810 mL / NET: 490 mL         IMAGING:     MEDICATIONS:  acetaminophen   Oral Liquid .. 975 milliGRAM(s) Oral every 6 hours  chlorhexidine 0.12% Liquid 15 milliLiter(s) Swish and Spit two times a day  lactated ringers. 1000 milliLiter(s) IV Continuous <Continuous>  melatonin 1 milliGRAM(s) Oral at bedtime PRN  ondansetron Injectable 4 milliGRAM(s) IV Push every 8 hours PRN  oxyCODONE    Solution 5 milliGRAM(s) Oral every 4 hours PRN  oxyCODONE    Solution 10 milliGRAM(s) Oral every 4 hours PRN       OTOLARYNGOLOGY (ENT) PROGRESS NOTE    PATIENT: JUANI WALKER     MRN: [id number]       : 47  ADMISSION:25  DATE OF SERVICE:  25 @ 06:51        Subjective/Interval:  - : LUISA pt resting comfortably in bed this AM. Endorses some pain with swallowing but tolerating CLD. Mild paresthesias on the tongue that is improving.                     Objective:    Vital Signs:  T(C): 36.7 (25 @ 05:06), Max: 36.9 (25 @ 13:40)  HR: 83 (25 @ 05:06) (71 - 83)  BP: 120/62 (25 @ 05:06) (105/63 - 152/79)  RR: 17 (25 @ 05:06) (11 - 18)  SpO2: 94% (25 @ 05:06) (94% - 99%)    PHYSICAL EXAM:  General: NAD, A+Ox3  Respiratory: No respiratory distress, stridor, or stertor  Voice quality: normal  Face:  Symmetric without masses or lesions  OU: EOMI  Right: Pinna wnl  Left: Pinna wnl  Nose: nares wnl  OC/OP: floor of mouth WNL, no masses or lesions, OP clear  Neck: soft/flat, no LAD, b/l neck incision c/d/i intact, NICK drains bilaterally with serosang op    Drains: NICK rt neck: 25cc serosang, NICK lf neck 35cc serosang      LABORATORY:                        14.3   15.53 )-----------( 319      ( 2025 05:30 )             41.4        138  |  104  |  15  ----------------------------<  149[H]  5.0   |  27  |  1.03    Ca    9.1      2025 05:30     [id number]    MICROBIOLOGY:      I&O:    25 @ 07:01  -  25 @ 06:51  --------------------------------------------------------  IN: 1300 mL / OUT: 810 mL / NET: 490 mL         IMAGING:     MEDICATIONS:  acetaminophen   Oral Liquid .. 975 milliGRAM(s) Oral every 6 hours  chlorhexidine 0.12% Liquid 15 milliLiter(s) Swish and Spit two times a day  lactated ringers. 1000 milliLiter(s) IV Continuous <Continuous>  melatonin 1 milliGRAM(s) Oral at bedtime PRN  ondansetron Injectable 4 milliGRAM(s) IV Push every 8 hours PRN  oxyCODONE    Solution 5 milliGRAM(s) Oral every 4 hours PRN  oxyCODONE    Solution 10 milliGRAM(s) Oral every 4 hours PRN

## 2025-01-07 NOTE — PROGRESS NOTE ADULT - ASSESSMENT
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 Pt stable and doing well this AM.    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 Pt stable and doing well this AM.    - adv to FLD  - pain/nausea control  - peridex  - decadron x24hrs  - monitor NICK drains  - ambulate/OOB  - SCDs

## 2025-01-08 LAB
ANION GAP SERPL CALC-SCNC: 10 MMOL/L — SIGNIFICANT CHANGE UP (ref 5–17)
BUN SERPL-MCNC: 18 MG/DL — SIGNIFICANT CHANGE UP (ref 7–23)
CALCIUM SERPL-MCNC: 9.4 MG/DL — SIGNIFICANT CHANGE UP (ref 8.4–10.5)
CHLORIDE SERPL-SCNC: 104 MMOL/L — SIGNIFICANT CHANGE UP (ref 96–108)
CO2 SERPL-SCNC: 29 MMOL/L — SIGNIFICANT CHANGE UP (ref 22–31)
CREAT SERPL-MCNC: 0.97 MG/DL — SIGNIFICANT CHANGE UP (ref 0.5–1.3)
EGFR: 80 ML/MIN/1.73M2 — SIGNIFICANT CHANGE UP
GLUCOSE SERPL-MCNC: 126 MG/DL — HIGH (ref 70–99)
HCT VFR BLD CALC: 44.7 % — SIGNIFICANT CHANGE UP (ref 39–50)
HGB BLD-MCNC: 15.1 G/DL — SIGNIFICANT CHANGE UP (ref 13–17)
MAGNESIUM SERPL-MCNC: 2.2 MG/DL — SIGNIFICANT CHANGE UP (ref 1.6–2.6)
MCHC RBC-ENTMCNC: 32.5 PG — SIGNIFICANT CHANGE UP (ref 27–34)
MCHC RBC-ENTMCNC: 33.8 G/DL — SIGNIFICANT CHANGE UP (ref 32–36)
MCV RBC AUTO: 96.1 FL — SIGNIFICANT CHANGE UP (ref 80–100)
NRBC # BLD: 0 /100 WBCS — SIGNIFICANT CHANGE UP (ref 0–0)
PHOSPHATE SERPL-MCNC: 3.2 MG/DL — SIGNIFICANT CHANGE UP (ref 2.5–4.5)
PLATELET # BLD AUTO: 327 K/UL — SIGNIFICANT CHANGE UP (ref 150–400)
POTASSIUM SERPL-MCNC: 4.7 MMOL/L — SIGNIFICANT CHANGE UP (ref 3.5–5.3)
POTASSIUM SERPL-SCNC: 4.7 MMOL/L — SIGNIFICANT CHANGE UP (ref 3.5–5.3)
RBC # BLD: 4.65 M/UL — SIGNIFICANT CHANGE UP (ref 4.2–5.8)
RBC # FLD: 12.5 % — SIGNIFICANT CHANGE UP (ref 10.3–14.5)
SODIUM SERPL-SCNC: 143 MMOL/L — SIGNIFICANT CHANGE UP (ref 135–145)
WBC # BLD: 20.08 K/UL — HIGH (ref 3.8–10.5)
WBC # FLD AUTO: 20.08 K/UL — HIGH (ref 3.8–10.5)

## 2025-01-08 RX ADMIN — ACETAMINOPHEN 975 MILLIGRAM(S): 80 SOLUTION/ DROPS ORAL at 07:49

## 2025-01-08 RX ADMIN — ACETAMINOPHEN 975 MILLIGRAM(S): 80 SOLUTION/ DROPS ORAL at 17:22

## 2025-01-08 RX ADMIN — ACETAMINOPHEN 975 MILLIGRAM(S): 80 SOLUTION/ DROPS ORAL at 12:23

## 2025-01-08 RX ADMIN — CHLORHEXIDINE GLUCONATE 15 MILLILITER(S): 1.2 RINSE ORAL at 17:22

## 2025-01-08 RX ADMIN — CHLORHEXIDINE GLUCONATE 15 MILLILITER(S): 1.2 RINSE ORAL at 08:00

## 2025-01-08 NOTE — SWALLOW BEDSIDE ASSESSMENT ADULT - NS SPL SWALLOW CLINIC TRIAL FT
Bolus stripping, containment, formation are WNL. Hyolaryngeal movement is adequate on palpation. There were no overt signs of aspiration or inefficiency. Pt can advance to puree and thin liquid diet. Further advancement pending ENT clearance. All questions were answered.

## 2025-01-08 NOTE — PROGRESS NOTE ADULT - SUBJECTIVE AND OBJECTIVE BOX
OTOLARYNGOLOGY (ENT) PROGRESS NOTE     Interval:  -1/7: ANABALAJI pt resting comfortably in bed this AM. Endorses some pain with swallowing but tolerating CLD. Mild paresthesias on the tongue that is improving.  -1/8                     Objective:    Vital Signs:  T(C): 36.2 (01-08-25 @ 05:06), Max: 36.9 (01-07-25 @ 12:18)  HR: 67 (01-08-25 @ 05:06) (67 - 86)  BP: 134/63 (01-08-25 @ 05:06) (132/65 - 138/70)  RR: 18 (01-08-25 @ 05:06) (18 - 18)  SpO2: 95% (01-08-25 @ 05:06) (94% - 95%)    PHYSICAL EXAM:  GEN: WDWN, appears stated age, NAD  NEURO: alert & oriented x 4/4  HEENT: face symmetric, oropharynx moist without exudates, CN VII/XI/XII intact  CVS: regular rate and rhythm, no ectopy on monitor  Pulm: normal respiratory excursions, not tachypneic, no labored breathing  Abd: non-distended  Ext: moving all four extremities, no peripheral edema noted     LINES/DRAINS/AIRWAY:  ***    LABORATORY:                        15.1   20.08 )-----------( 327      ( 08 Jan 2025 05:30 )             44.7    01-08    143  |  104  |  18  ----------------------------<  126[H]  4.7   |  29  |  0.97    Ca    9.4      08 Jan 2025 05:30  Phos  3.2     01-08  Mg     2.2     01-08     [id number]    MICROBIOLOGY:      I&O:    01-07-25 @ 07:01  -  01-08-25 @ 07:00  --------------------------------------------------------  IN: 0 mL / OUT: 1265 mL / NET: -1265 mL         IMAGING:     MEDICATIONS:  acetaminophen   Oral Liquid .. 975 milliGRAM(s) Oral every 6 hours  chlorhexidine 0.12% Liquid 15 milliLiter(s) Swish and Spit two times a day  melatonin 1 milliGRAM(s) Oral at bedtime PRN  ondansetron Injectable 4 milliGRAM(s) IV Push every 8 hours PRN  oxyCODONE    Solution 5 milliGRAM(s) Oral every 4 hours PRN  oxyCODONE    Solution 10 milliGRAM(s) Oral every 4 hours PRN      Assessment and Plan:  JUANI WALKER  is a 79y Male  .. S/P ...    PLAN:     HEENT:     Disposition:   [facility] at [phone] with any questions/concerns.    Sana Alvarez PA-C  01-08-25 @ 08:00       OTOLARYNGOLOGY (ENT) PROGRESS NOTE     Interval:  -1/7: HUGHON, pt resting comfortably in bed this AM. Endorses some pain with swallowing but tolerating CLD. Mild paresthesias on the tongue that is improving.  -1/8: NAEON, pt resting comfortably in bed this AM. Endorses mild pain with swallowing but tolerating FLD. improving tongue paresthesia.                     Objective:    Vital Signs:  T(C): 36.2 (01-08-25 @ 05:06), Max: 36.9 (01-07-25 @ 12:18)  HR: 67 (01-08-25 @ 05:06) (67 - 86)  BP: 134/63 (01-08-25 @ 05:06) (132/65 - 138/70)  RR: 18 (01-08-25 @ 05:06) (18 - 18)  SpO2: 95% (01-08-25 @ 05:06) (94% - 95%)    PHYSICAL EXAM:  General: NAD, A+Ox3  Respiratory: No respiratory distress, stridor, or stertor  Voice quality: normal  Face:  Symmetric without masses or lesions  OU: EOMI  Right: Pinna wnl  Left: Pinna wnl  Nose: B/l nares patent  OC/OP: floor of mouth WNL, no masses or lesions, OP clear, tongue movements intact  Neck: soft/flat, no LAD, neck incision c/d/i. NICK drain x2 serosang output  Neuro: CNII-XII intact     LABORATORY:                        15.1   20.08 )-----------( 327      ( 08 Jan 2025 05:30 )             44.7    01-08    143  |  104  |  18  ----------------------------<  126[H]  4.7   |  29  |  0.97    Ca    9.4      08 Jan 2025 05:30  Phos  3.2     01-08  Mg     2.2     01-08     [id number]        I&O:    01-07-25 @ 07:01  -  01-08-25 @ 07:00  --------------------------------------------------------  IN: 0 mL / OUT: 1265 mL / NET: -1265 mL         IMAGING:     MEDICATIONS:  acetaminophen   Oral Liquid .. 975 milliGRAM(s) Oral every 6 hours  chlorhexidine 0.12% Liquid 15 milliLiter(s) Swish and Spit two times a day  melatonin 1 milliGRAM(s) Oral at bedtime PRN  ondansetron Injectable 4 milliGRAM(s) IV Push every 8 hours PRN  oxyCODONE    Solution 5 milliGRAM(s) Oral every 4 hours PRN  oxyCODONE    Solution 10 milliGRAM(s) Oral every 4 hours PRN

## 2025-01-08 NOTE — PROGRESS NOTE ADULT - ASSESSMENT
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 Pt stable and doing well this AM.    - adv to soft diet as tolerated  - pain/nausea control  - peridex  - monitor NICK drains  - remove rt NICK drain today  - ambulate/OOB  - SCDs  - Anticipate d/c tomorrow

## 2025-01-08 NOTE — SWALLOW BEDSIDE ASSESSMENT ADULT - SLP PERTINENT HISTORY OF CURRENT PROBLEM
h/o SCCA of R tonsil s/p B/L tonsillectomy and SND II-IV in April 2022, now with new SCCA in R BOT s/p TORS BOT resection, B/L SND II-IV on 1/6

## 2025-01-09 ENCOUNTER — TRANSCRIPTION ENCOUNTER (OUTPATIENT)
Age: 80
End: 2025-01-09

## 2025-01-09 VITALS
OXYGEN SATURATION: 93 % | SYSTOLIC BLOOD PRESSURE: 152 MMHG | DIASTOLIC BLOOD PRESSURE: 77 MMHG | RESPIRATION RATE: 16 BRPM | TEMPERATURE: 99 F | HEART RATE: 92 BPM

## 2025-01-09 LAB
ANION GAP SERPL CALC-SCNC: 7 MMOL/L — SIGNIFICANT CHANGE UP (ref 5–17)
BUN SERPL-MCNC: 21 MG/DL — SIGNIFICANT CHANGE UP (ref 7–23)
CALCIUM SERPL-MCNC: 8.9 MG/DL — SIGNIFICANT CHANGE UP (ref 8.4–10.5)
CHLORIDE SERPL-SCNC: 104 MMOL/L — SIGNIFICANT CHANGE UP (ref 96–108)
CO2 SERPL-SCNC: 30 MMOL/L — SIGNIFICANT CHANGE UP (ref 22–31)
CREAT SERPL-MCNC: 1.08 MG/DL — SIGNIFICANT CHANGE UP (ref 0.5–1.3)
EGFR: 70 ML/MIN/1.73M2 — SIGNIFICANT CHANGE UP
GLUCOSE SERPL-MCNC: 96 MG/DL — SIGNIFICANT CHANGE UP (ref 70–99)
HCT VFR BLD CALC: 43.1 % — SIGNIFICANT CHANGE UP (ref 39–50)
HGB BLD-MCNC: 14.2 G/DL — SIGNIFICANT CHANGE UP (ref 13–17)
MAGNESIUM SERPL-MCNC: 2.1 MG/DL — SIGNIFICANT CHANGE UP (ref 1.6–2.6)
MCHC RBC-ENTMCNC: 32.2 PG — SIGNIFICANT CHANGE UP (ref 27–34)
MCHC RBC-ENTMCNC: 32.9 G/DL — SIGNIFICANT CHANGE UP (ref 32–36)
MCV RBC AUTO: 97.7 FL — SIGNIFICANT CHANGE UP (ref 80–100)
NRBC # BLD: 0 /100 WBCS — SIGNIFICANT CHANGE UP (ref 0–0)
PHOSPHATE SERPL-MCNC: 2.2 MG/DL — LOW (ref 2.5–4.5)
PLATELET # BLD AUTO: 308 K/UL — SIGNIFICANT CHANGE UP (ref 150–400)
POTASSIUM SERPL-MCNC: 4.1 MMOL/L — SIGNIFICANT CHANGE UP (ref 3.5–5.3)
POTASSIUM SERPL-SCNC: 4.1 MMOL/L — SIGNIFICANT CHANGE UP (ref 3.5–5.3)
RBC # BLD: 4.41 M/UL — SIGNIFICANT CHANGE UP (ref 4.2–5.8)
RBC # FLD: 12.5 % — SIGNIFICANT CHANGE UP (ref 10.3–14.5)
SODIUM SERPL-SCNC: 141 MMOL/L — SIGNIFICANT CHANGE UP (ref 135–145)
WBC # BLD: 14.03 K/UL — HIGH (ref 3.8–10.5)
WBC # FLD AUTO: 14.03 K/UL — HIGH (ref 3.8–10.5)

## 2025-01-09 PROCEDURE — 92610 EVALUATE SWALLOWING FUNCTION: CPT

## 2025-01-09 PROCEDURE — 83735 ASSAY OF MAGNESIUM: CPT

## 2025-01-09 PROCEDURE — C9399: CPT

## 2025-01-09 PROCEDURE — 84100 ASSAY OF PHOSPHORUS: CPT

## 2025-01-09 PROCEDURE — 88342 IMHCHEM/IMCYTCHM 1ST ANTB: CPT

## 2025-01-09 PROCEDURE — 88331 PATH CONSLTJ SURG 1 BLK 1SPC: CPT

## 2025-01-09 PROCEDURE — 88305 TISSUE EXAM BY PATHOLOGIST: CPT

## 2025-01-09 PROCEDURE — S2900: CPT

## 2025-01-09 PROCEDURE — 88309 TISSUE EXAM BY PATHOLOGIST: CPT

## 2025-01-09 PROCEDURE — C1889: CPT

## 2025-01-09 PROCEDURE — 85027 COMPLETE CBC AUTOMATED: CPT

## 2025-01-09 PROCEDURE — 36415 COLL VENOUS BLD VENIPUNCTURE: CPT

## 2025-01-09 PROCEDURE — 80048 BASIC METABOLIC PNL TOTAL CA: CPT

## 2025-01-09 RX ORDER — CHLORHEXIDINE GLUCONATE 1.2 MG/ML
15 RINSE ORAL
Qty: 1 | Refills: 0
Start: 2025-01-09 | End: 2025-01-15

## 2025-01-09 RX ORDER — SENNOSIDES 8.6 MG/1
10 TABLET, FILM COATED ORAL
Qty: 50 | Refills: 0
Start: 2025-01-09 | End: 2025-01-13

## 2025-01-09 RX ORDER — ACETAMINOPHEN 80 MG/.8ML
30.45 SOLUTION/ DROPS ORAL
Qty: 1705.2 | Refills: 0
Start: 2025-01-09 | End: 2025-01-22

## 2025-01-09 RX ORDER — POLYETHYLENE GLYCOL 3350 17 G/DOSE
17 POWDER (GRAM) ORAL
Qty: 1 | Refills: 0
Start: 2025-01-09 | End: 2025-01-13

## 2025-01-09 RX ADMIN — ACETAMINOPHEN 975 MILLIGRAM(S): 80 SOLUTION/ DROPS ORAL at 12:41

## 2025-01-09 RX ADMIN — ACETAMINOPHEN 975 MILLIGRAM(S): 80 SOLUTION/ DROPS ORAL at 06:16

## 2025-01-09 RX ADMIN — ACETAMINOPHEN 975 MILLIGRAM(S): 80 SOLUTION/ DROPS ORAL at 00:01

## 2025-01-09 RX ADMIN — CHLORHEXIDINE GLUCONATE 15 MILLILITER(S): 1.2 RINSE ORAL at 06:16

## 2025-01-09 NOTE — DISCHARGE NOTE PROVIDER - NSDCMRMEDTOKEN_GEN_ALL_CORE_FT
acetaminophen 325 mg/10.15 mL oral liquid: 30.45 milliliter(s) orally every 6 hours as needed for  moderate pain  Myrbetriq 50 mg oral tablet, extended release: 1 tab(s) orally once a day   acetaminophen 325 mg/10.15 mL oral liquid: 30.45 milliliter(s) orally every 6 hours as needed for  moderate pain  MiraLax oral powder for reconstitution: 17 gram(s) orally once a day as needed for  constipation  Myrbetriq 50 mg oral tablet, extended release: 1 tab(s) orally once a day  Senna 8.8 mg/5 mL oral syrup: 10 milliliter(s) orally once a day as needed for  constipation

## 2025-01-09 NOTE — PROGRESS NOTE ADULT - SUBJECTIVE AND OBJECTIVE BOX
OTOLARYNGOLOGY (ENT) PROGRESS NOTE     Interval:  -1/7: NAEON, pt resting comfortably in bed this AM. Endorses some pain with swallowing but tolerating CLD. Mild paresthesias on the tongue that is improving.  -1/8: NAEON, pt resting comfortably in bed this AM. Endorses mild pain with swallowing but tolerating FLD. improving tongue paresthesia.              - 1/9: NAEON, Pt resting comfortably in bed this AM. notes improved swallowing and tolerating pureed diet.           Objective:    Vital Signs:  T(C): 36.7 (01-09-25 @ 04:15), Max: 36.8 (01-08-25 @ 13:07)  HR: 69 (01-09-25 @ 04:15) (64 - 80)  BP: 127/61 (01-09-25 @ 04:15) (127/61 - 148/68)  RR: 17 (01-09-25 @ 04:15) (16 - 18)  SpO2: 95% (01-09-25 @ 04:15) (95% - 97%)    PHYSICAL EXAM:  General: NAD, A+Ox3  Respiratory: No respiratory distress, stridor, or stertor  Voice quality: normal  Face:  Symmetric without masses or lesions  OU: EOMI  Right: Pinna wnl  Left: Pinna wnl  Nose: B/l nares patent  OC/OP: floor of mouth WNL, no masses or lesions, OP clear, tongue movements intact  Neck: soft/flat, no LAD, neck incision c/d/i. NICK drain x2 serosang output  Neuro: CNII-XII intact     LINES/DRAINS/AIRWAY:  ***    LABORATORY:                        14.2   14.03 )-----------( 308      ( 09 Jan 2025 05:30 )             43.1    01-09    141  |  104  |  21  ----------------------------<  96  4.1   |  30  |  1.08    Ca    8.9      09 Jan 2025 05:30  Phos  2.2     01-09  Mg     2.1     01-09     [id number]    MICROBIOLOGY:      I&O:    01-08-25 @ 07:01  -  01-09-25 @ 07:00  --------------------------------------------------------  IN: 660 mL / OUT: 1625 mL / NET: -965 mL         IMAGING:     MEDICATIONS:  acetaminophen   Oral Liquid .. 975 milliGRAM(s) Oral every 6 hours  chlorhexidine 0.12% Liquid 15 milliLiter(s) Swish and Spit two times a day  melatonin 1 milliGRAM(s) Oral at bedtime PRN  ondansetron Injectable 4 milliGRAM(s) IV Push every 8 hours PRN  oxyCODONE    Solution 5 milliGRAM(s) Oral every 4 hours PRN  oxyCODONE    Solution 10 milliGRAM(s) Oral every 4 hours PRN      Disposition:   [facility] at [phone] with any questions/concerns.

## 2025-01-09 NOTE — DISCHARGE NOTE PROVIDER - CARE PROVIDER_API CALL
Gigi Miles.  Otolaryngology  [address] [place] [address] - Dept [place] [address]  Phone: [phone]  Fax: [phone]  Follow Up Time:

## 2025-01-09 NOTE — DISCHARGE NOTE NURSING/CASE MANAGEMENT/SOCIAL WORK - FINANCIAL ASSISTANCE
[facility] provides services at a reduced cost to those who are determined to be eligible through [facility]’s financial assistance program. Information regarding [facility]’s financial assistance program can be found by going to [email or web address] or by calling [phone].

## 2025-01-09 NOTE — DISCHARGE NOTE PROVIDER - NSDCFUSCHEDAPPT_GEN_ALL_CORE_FT
Caitie Unger  [facility]  [address]  Scheduled Appointment: 02/10/2025

## 2025-01-09 NOTE — DISCHARGE NOTE PROVIDER - YES NO FOR MLM POSITIVE OR NEGATIVE COVID RESULT
Assessment and Plan





dilated pelvis on renal scan 


washed out 


f/u as out pt 





Subjective


Date of service: 09/27/18


Principal diagnosis: renal stones 





Objective





- Constitutional


Vitals: 


 Vital Signs - 12hr











  09/27/18 09/27/18





  05:38 12:01


 


Temperature 98.7 F 98.1 F


 


Pulse Rate 57 L 


 


Respiratory 12 22





Rate  


 


Blood Pressure 132/78 142/91


 


O2 Sat by Pulse 99 





Oximetry  














- Labs


CBC & Chem 7: 


 09/27/18 04:59





 09/27/18 04:59


Labs: 


 Abnormal lab results











  09/27/18 09/27/18 Range/Units





  04:59 04:59 


 


MCHC  35 H   (32-34)  %


 


RDW  12.7 L   (13.2-15.2)  %


 


BUN   8 L  (9-20)  mg/dL


 


Creatinine   0.7 L  (0.8-1.5)  mg/dL ,

## 2025-01-09 NOTE — DISCHARGE NOTE PROVIDER - NSDCFUADDINST_GEN_ALL_CORE_FT
General Discharge Instructions:  Please resume all regular home medications unless specifically advised not to take a particular medication. Also, please take any new medications as prescribed.  Please get plenty of rest, continue to ambulate several times per day, and drink adequate amounts of fluids. Avoid lifting weights greater than 5-10 lbs until you follow-up with your surgeon, who will instruct you further regarding activity restrictions.  Avoid driving or operating heavy machinery while taking pain medications.  Please follow-up with your surgeon and Primary Care Provider (PCP) as advised.  Incision Care:  *Please call your doctor or nurse practitioner if you have increased pain, swelling, redness, or drainage from the incision site.  *Avoid swimming and baths until your follow-up appointment.  *You may shower, and wash surgical incisions with a mild soap and warm water. Gently pat the area dry.  *If you have staples, they will be removed at your follow-up appointment.  *If you have steri-strips, they will fall off on their own. Please remove any remaining strips 7-10 days after surgery.     Warning Signs:  Please call your doctor or nurse practitioner if you experience the following:  *You experience new chest pain, pressure, squeezing or tightness.  *New or worsening cough, shortness of breath, or wheeze.  *If you are vomiting and cannot keep down fluids or your medications.  *You are getting dehydrated due to continued vomiting, diarrhea, or other reasons. Signs of dehydration include dry mouth, rapid heartbeat, or feeling dizzy or faint when standing.  *You see blood or dark/black material when you vomit or have a bowel movement.  *You experience burning when you urinate, have blood in your urine, or experience a discharge.  *Your pain is not improving within 8-12 hours or is not gone within 24 hours. Call or return immediately if your pain is getting worse, changes location, or moves to your chest or back.  *You have shaking chills, or fever greater than 101.5 degrees Fahrenheit or 38 degrees Celsius.  *Any change in your symptoms, or any new symptoms that concern you.

## 2025-01-09 NOTE — DISCHARGE NOTE PROVIDER - HOSPITAL COURSE
The patient is s/p TORS BOT resection, bilateral SND II-IV on 1.6. Postoperatively the patient recovered in the PACU, was hemodynamically stable, and was sent to the floor. The patients pain was controlled with PO pain medications. The patient is tolerating current diet regiment. The patient had a NICK drain that was removed prior to Procedure ended without complication and patient was admitted for post-op observation. Patient is currently ambulating appropriately, voiding freely, tolerating diet, and pain is well controlled. On day of discharge, patient deemed stable and ready to return home. .The patient was hemodynamically stable and placed on home medications. The patient will follow up with

## 2025-01-09 NOTE — DISCHARGE NOTE NURSING/CASE MANAGEMENT/SOCIAL WORK - PATIENT PORTAL LINK FT
You can access the FollowMyHealth Patient Portal offered by [facility] by registering at the following website: [email or web address]. By joining [employer]’s FollowMyHealth portal, you will also be able to view your health information using other applications (apps) compatible with our system.

## 2025-01-09 NOTE — PROGRESS NOTE ADULT - ASSESSMENT
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 Pt stable and doing well this AM.    - adv to soft diet today  - pain/nausea control  - peridex  - D/c NICK drains  - ambulate/OOB  - SCDs  - d/c home with Peridex

## 2025-01-09 NOTE — DISCHARGE NOTE PROVIDER - NSDCCPCAREPLAN_GEN_ALL_CORE_FT
PRINCIPAL DISCHARGE DIAGNOSIS  Diagnosis: Cancer of base of tongue  Assessment and Plan of Treatment:

## 2025-01-14 DIAGNOSIS — C01 MALIGNANT NEOPLASM OF BASE OF TONGUE: ICD-10-CM

## 2025-01-14 DIAGNOSIS — C77.9 SECONDARY AND UNSPECIFIED MALIGNANT NEOPLASM OF LYMPH NODE, UNSPECIFIED: ICD-10-CM

## 2025-01-28 ENCOUNTER — APPOINTMENT (OUTPATIENT)
Dept: OTOLARYNGOLOGY | Facility: CLINIC | Age: 80
End: 2025-01-28
Payer: MEDICARE

## 2025-01-28 ENCOUNTER — NON-APPOINTMENT (OUTPATIENT)
Age: 80
End: 2025-01-28

## 2025-01-28 VITALS
OXYGEN SATURATION: 96 % | HEART RATE: 76 BPM | HEIGHT: 68 IN | BODY MASS INDEX: 22.73 KG/M2 | WEIGHT: 150 LBS | SYSTOLIC BLOOD PRESSURE: 144 MMHG | DIASTOLIC BLOOD PRESSURE: 70 MMHG | TEMPERATURE: 98 F

## 2025-01-28 PROCEDURE — 99024 POSTOP FOLLOW-UP VISIT: CPT

## 2025-01-29 DIAGNOSIS — C01 MALIGNANT NEOPLASM OF BASE OF TONGUE: ICD-10-CM

## 2025-04-04 ENCOUNTER — EMERGENCY (EMERGENCY)
Facility: HOSPITAL | Age: 80
LOS: 1 days | Discharge: HOME | End: 2025-04-04
Attending: EMERGENCY MEDICINE | Admitting: EMERGENCY MEDICINE
Payer: MEDICARE

## 2025-04-04 VITALS
HEIGHT: 66 IN | HEART RATE: 69 BPM | TEMPERATURE: 97 F | RESPIRATION RATE: 18 BRPM | OXYGEN SATURATION: 98 % | SYSTOLIC BLOOD PRESSURE: 175 MMHG | WEIGHT: 139.99 LBS | DIASTOLIC BLOOD PRESSURE: 78 MMHG

## 2025-04-04 VITALS
OXYGEN SATURATION: 99 % | HEART RATE: 71 BPM | DIASTOLIC BLOOD PRESSURE: 74 MMHG | RESPIRATION RATE: 18 BRPM | SYSTOLIC BLOOD PRESSURE: 142 MMHG

## 2025-04-04 DIAGNOSIS — Z98.890 OTHER SPECIFIED POSTPROCEDURAL STATES: Chronic | ICD-10-CM

## 2025-04-04 DIAGNOSIS — Z90.49 ACQUIRED ABSENCE OF OTHER SPECIFIED PARTS OF DIGESTIVE TRACT: Chronic | ICD-10-CM

## 2025-04-04 LAB
ALBUMIN SERPL ELPH-MCNC: 3.8 G/DL — SIGNIFICANT CHANGE UP (ref 3.3–5)
ALP SERPL-CCNC: 94 U/L — SIGNIFICANT CHANGE UP (ref 40–120)
ALT FLD-CCNC: 16 U/L — SIGNIFICANT CHANGE UP (ref 10–45)
ANION GAP SERPL CALC-SCNC: 3 MMOL/L — LOW (ref 5–17)
AST SERPL-CCNC: 15 U/L — SIGNIFICANT CHANGE UP (ref 10–40)
BASOPHILS # BLD AUTO: 0.04 K/UL — SIGNIFICANT CHANGE UP (ref 0–0.2)
BASOPHILS NFR BLD AUTO: 0.5 % — SIGNIFICANT CHANGE UP (ref 0–2)
BILIRUB SERPL-MCNC: 0.3 MG/DL — SIGNIFICANT CHANGE UP (ref 0.2–1.2)
BUN SERPL-MCNC: 16 MG/DL — SIGNIFICANT CHANGE UP (ref 7–23)
CALCIUM SERPL-MCNC: 8.8 MG/DL — SIGNIFICANT CHANGE UP (ref 8.4–10.5)
CHLORIDE SERPL-SCNC: 105 MMOL/L — SIGNIFICANT CHANGE UP (ref 96–108)
CO2 SERPL-SCNC: 31 MMOL/L — SIGNIFICANT CHANGE UP (ref 22–31)
CREAT SERPL-MCNC: 0.92 MG/DL — SIGNIFICANT CHANGE UP (ref 0.5–1.3)
EGFR: 86 ML/MIN/1.73M2 — SIGNIFICANT CHANGE UP
EGFR: 86 ML/MIN/1.73M2 — SIGNIFICANT CHANGE UP
EOSINOPHIL # BLD AUTO: 0.31 K/UL — SIGNIFICANT CHANGE UP (ref 0–0.5)
EOSINOPHIL NFR BLD AUTO: 4 % — SIGNIFICANT CHANGE UP (ref 0–6)
GLUCOSE SERPL-MCNC: 139 MG/DL — HIGH (ref 70–99)
HCT VFR BLD CALC: 43.1 % — SIGNIFICANT CHANGE UP (ref 39–50)
HGB BLD-MCNC: 14.5 G/DL — SIGNIFICANT CHANGE UP (ref 13–17)
IMM GRANULOCYTES NFR BLD AUTO: 0.1 % — SIGNIFICANT CHANGE UP (ref 0–0.9)
LIDOCAIN IGE QN: 70 U/L — SIGNIFICANT CHANGE UP (ref 16–77)
LYMPHOCYTES # BLD AUTO: 1.65 K/UL — SIGNIFICANT CHANGE UP (ref 1–3.3)
LYMPHOCYTES # BLD AUTO: 21.3 % — SIGNIFICANT CHANGE UP (ref 13–44)
MCHC RBC-ENTMCNC: 32.3 PG — SIGNIFICANT CHANGE UP (ref 27–34)
MCHC RBC-ENTMCNC: 33.6 G/DL — SIGNIFICANT CHANGE UP (ref 32–36)
MCV RBC AUTO: 96 FL — SIGNIFICANT CHANGE UP (ref 80–100)
MONOCYTES # BLD AUTO: 0.69 K/UL — SIGNIFICANT CHANGE UP (ref 0–0.9)
MONOCYTES NFR BLD AUTO: 8.9 % — SIGNIFICANT CHANGE UP (ref 2–14)
NEUTROPHILS # BLD AUTO: 5.06 K/UL — SIGNIFICANT CHANGE UP (ref 1.8–7.4)
NEUTROPHILS NFR BLD AUTO: 65.2 % — SIGNIFICANT CHANGE UP (ref 43–77)
NRBC BLD AUTO-RTO: 0 /100 WBCS — SIGNIFICANT CHANGE UP (ref 0–0)
PLATELET # BLD AUTO: 198 K/UL — SIGNIFICANT CHANGE UP (ref 150–400)
POTASSIUM SERPL-MCNC: 4.1 MMOL/L — SIGNIFICANT CHANGE UP (ref 3.5–5.3)
POTASSIUM SERPL-SCNC: 4.1 MMOL/L — SIGNIFICANT CHANGE UP (ref 3.5–5.3)
PROT SERPL-MCNC: 7 G/DL — SIGNIFICANT CHANGE UP (ref 6–8.3)
RBC # BLD: 4.49 M/UL — SIGNIFICANT CHANGE UP (ref 4.2–5.8)
RBC # FLD: 12.7 % — SIGNIFICANT CHANGE UP (ref 10.3–14.5)
SODIUM SERPL-SCNC: 139 MMOL/L — SIGNIFICANT CHANGE UP (ref 135–145)
TROPONIN I, HIGH SENSITIVITY RESULT: 7.1 NG/L — SIGNIFICANT CHANGE UP
WBC # BLD: 7.76 K/UL — SIGNIFICANT CHANGE UP (ref 3.8–10.5)
WBC # FLD AUTO: 7.76 K/UL — SIGNIFICANT CHANGE UP (ref 3.8–10.5)

## 2025-04-04 PROCEDURE — 83690 ASSAY OF LIPASE: CPT

## 2025-04-04 PROCEDURE — 85025 COMPLETE CBC W/AUTO DIFF WBC: CPT

## 2025-04-04 PROCEDURE — 84484 ASSAY OF TROPONIN QUANT: CPT

## 2025-04-04 PROCEDURE — 71045 X-RAY EXAM CHEST 1 VIEW: CPT

## 2025-04-04 PROCEDURE — 99285 EMERGENCY DEPT VISIT HI MDM: CPT | Mod: 25

## 2025-04-04 PROCEDURE — 80053 COMPREHEN METABOLIC PANEL: CPT

## 2025-04-04 PROCEDURE — 99285 EMERGENCY DEPT VISIT HI MDM: CPT

## 2025-04-04 PROCEDURE — 71045 X-RAY EXAM CHEST 1 VIEW: CPT | Mod: 26

## 2025-04-04 PROCEDURE — 93010 ELECTROCARDIOGRAM REPORT: CPT

## 2025-04-04 PROCEDURE — 36415 COLL VENOUS BLD VENIPUNCTURE: CPT

## 2025-04-04 PROCEDURE — 93005 ELECTROCARDIOGRAM TRACING: CPT

## 2025-04-04 RX ORDER — LIDOCAINE HYDROCHLORIDE 20 MG/ML
10 JELLY TOPICAL ONCE
Refills: 0 | Status: COMPLETED | OUTPATIENT
Start: 2025-04-04 | End: 2025-04-04

## 2025-04-04 RX ORDER — MAGNESIUM, ALUMINUM HYDROXIDE 200-200 MG
30 TABLET,CHEWABLE ORAL ONCE
Refills: 0 | Status: COMPLETED | OUTPATIENT
Start: 2025-04-04 | End: 2025-04-04

## 2025-04-04 RX ADMIN — Medication 30 MILLILITER(S): at 01:08

## 2025-04-04 RX ADMIN — LIDOCAINE HYDROCHLORIDE 10 MILLILITER(S): 20 JELLY TOPICAL at 01:08

## 2025-04-04 NOTE — ED PROVIDER NOTE - PROGRESS NOTE DETAILS
Patient's laboratory studies were nonactionable.  EKG was nonischemic.  Patient feels significantly better with no symptoms after receiving a GI cocktail.  This being the case I believe this is the most likely etiology.  He did have a recent cardiac workup so we will not admit for another at this time.  Patient can follow-up with his PMD in the next 48 hours

## 2025-04-04 NOTE — ED PROVIDER NOTE - CLINICAL SUMMARY MEDICAL DECISION MAKING FREE TEXT BOX
79-year-old male no past medical history presenting with a few hours of epigastric burning.  Differential includes but not limited to atypical ACS, pancreatitis, reflux.  Will get laboratory studies imaging and reassess after a GI cocktail.  He ready recently had a cardiac evaluation

## 2025-04-04 NOTE — ED ADULT NURSE NOTE - OBJECTIVE STATEMENT
Presents to ED from home c/o right side  chest burning pain stated 2 hours ago, right upper abdominal burning pain , high blood pressure 185/85. denies shortness of breath, nausea, vomiting, fever, chills

## 2025-04-04 NOTE — ED PROVIDER NOTE - PATIENT PORTAL LINK FT
You can access the FollowMyHealth Patient Portal offered by [facility] by registering at the following website: [email or web address]. By joining [employer]’s FollowMyHealth portal, you will also be able to view your health information using other applications (apps) compatible with our system.

## 2025-04-04 NOTE — ED PROVIDER NOTE - NSFOLLOWUPINSTRUCTIONS_ED_ALL_ED_FT
Epigastric Pain    WHAT YOU NEED TO KNOW:    Epigastric pain is felt in the middle of the upper abdomen, between the ribs and the bellybutton. The pain may be mild or severe. Pain may spread from or to another part of your body. Epigastric pain may be a sign of a serious health problem that needs to be treated.    DISCHARGE INSTRUCTIONS:    Call 911 for any of the following:    You have any of the following signs of a heart attack:  Squeezing, pressure, or pain in your chest    You may also have any of the following:  Discomfort or pain in your back, neck, jaw, stomach, or arm    Shortness of breath    Nausea or vomiting    Lightheadedness or a sudden cold sweat    You have severe pain that radiates to your jaw or back.  Return to the emergency department if:    You have severe pain that starts suddenly and quickly gets worse.    You cannot have a bowel movement and are vomiting.    You vomit or cough up blood.    You see blood in your urine or bowel movement.    You feel drowsy and your breathing is slower than usual.  Contact your healthcare provider if:    You have a fever or chills.    You have yellowing of your skin or the whites of your eyes.    You vomit often or several times in a row.    You lose weight without trying.    You have symptoms for longer than 2 weeks.    You have questions or concerns about your condition or care.  Medicines:    Medicines may be given to treat pain or stop vomiting. You may also need medicines to reduce or control stomach acid, or treat an infection.    Take your medicine as directed. Contact your healthcare provider if you think your medicine is not helping or if you have side effects. Tell your provider if you are allergic to any medicine. Keep a list of the medicines, vitamins, and herbs you take. Include the amounts, and when and why you take them. Bring the list or the pill bottles to follow-up visits. Carry your medicine list with you in case of an emergency.  Follow up with your healthcare provider as directed: Write down your questions so you remember to ask them during your visits.    Manage your symptoms:    Keep a record of your symptoms. Include when the pain starts, how long it lasts, and if it is sharp or dull. Also include any foods you ate or activities you did before the pain started. Keep track of anything that helped the pain.    Eat a variety of healthy foods. Healthy foods include fruits, vegetables, whole-grain breads, low-fat dairy products, beans, lean meats, and fish. Ask if you need to be on a special diet. Certain foods may cause your pain, such as alcohol or foods that are high in fat. You may need to eat smaller meals and to eat more often than usual.    Drink liquids as directed. Ask how much liquid to drink each day and which liquids are best for you. Do not have drinks that contain alcohol or caffeine.

## 2025-04-04 NOTE — ED ADULT TRIAGE NOTE - CHIEF COMPLAINT QUOTE
pt. c/o right side  chest burning pain stated 2 hours ago, right upper abdominal burning pain , high blood pressure 185/85. denies shortness of breath, nausea, vomiting, fever, chills

## 2025-04-04 NOTE — ED PROVIDER NOTE - OBJECTIVE STATEMENT
79-year-old male no significant past medical history presenting with a couple hours epigastric burning.  States at 10 PM he started having epigastric burning that radiated up into his chest.  States never had pain like this before.  It was not exertional.  States that the pain is currently still present but not as bad as before.  There was no shortness of breath that was not pleuritic.  No history of similar pain.  Had a normal stress and echo in the last 12 months.

## 2025-04-04 NOTE — ED PROVIDER NOTE - NSICDXPASTSURGICALHX_GEN_ALL_CORE_FT
PAST SURGICAL HISTORY:  H/O colonoscopy x2    H/O endoscopy     H/O neck surgery     History of appendectomy

## 2025-04-15 ENCOUNTER — APPOINTMENT (OUTPATIENT)
Dept: FAMILY MEDICINE | Facility: CLINIC | Age: 80
End: 2025-04-15

## 2025-04-15 VITALS
HEIGHT: 68 IN | TEMPERATURE: 97.2 F | OXYGEN SATURATION: 98 % | SYSTOLIC BLOOD PRESSURE: 140 MMHG | WEIGHT: 150 LBS | RESPIRATION RATE: 15 BRPM | DIASTOLIC BLOOD PRESSURE: 64 MMHG | HEART RATE: 79 BPM | BODY MASS INDEX: 22.73 KG/M2

## 2025-04-15 DIAGNOSIS — R07.89 OTHER CHEST PAIN: ICD-10-CM

## 2025-04-15 DIAGNOSIS — Z00.00 ENCOUNTER FOR GENERAL ADULT MEDICAL EXAMINATION W/OUT ABNORMAL FINDINGS: ICD-10-CM

## 2025-04-15 DIAGNOSIS — C01 MALIGNANT NEOPLASM OF BASE OF TONGUE: ICD-10-CM

## 2025-04-15 DIAGNOSIS — J47.9 BRONCHIECTASIS, UNCOMPLICATED: ICD-10-CM

## 2025-04-15 DIAGNOSIS — R74.8 ABNORMAL LEVELS OF OTHER SERUM ENZYMES: ICD-10-CM

## 2025-04-15 PROCEDURE — G0439: CPT | Mod: 59

## 2025-04-15 PROCEDURE — 99213 OFFICE O/P EST LOW 20 MIN: CPT | Mod: 25

## 2025-04-15 PROCEDURE — G2211 COMPLEX E/M VISIT ADD ON: CPT

## 2025-04-16 LAB
ALBUMIN SERPL ELPH-MCNC: 4.5 G/DL
ALP BLD-CCNC: 110 U/L
ALT SERPL-CCNC: 9 U/L
AMYLASE/CREAT SERPL: 129 U/L
ANION GAP SERPL CALC-SCNC: 8 MMOL/L
APPEARANCE: CLEAR
AST SERPL-CCNC: 14 U/L
BASOPHILS # BLD AUTO: 0.04 K/UL
BASOPHILS NFR BLD AUTO: 0.7 %
BILIRUB SERPL-MCNC: 0.6 MG/DL
BILIRUBIN URINE: NEGATIVE
BLOOD URINE: NEGATIVE
BUN SERPL-MCNC: 17 MG/DL
CALCIUM SERPL-MCNC: 10.1 MG/DL
CHLORIDE SERPL-SCNC: 104 MMOL/L
CHOLEST SERPL-MCNC: 161 MG/DL
CO2 SERPL-SCNC: 30 MMOL/L
COLOR: YELLOW
CREAT SERPL-MCNC: 0.98 MG/DL
EGFRCR SERPLBLD CKD-EPI 2021: 79 ML/MIN/1.73M2
EOSINOPHIL # BLD AUTO: 0.22 K/UL
EOSINOPHIL NFR BLD AUTO: 3.6 %
GLUCOSE QUALITATIVE U: NEGATIVE MG/DL
GLUCOSE SERPL-MCNC: 103 MG/DL
HCT VFR BLD CALC: 46 %
HDLC SERPL-MCNC: 57 MG/DL
HGB BLD-MCNC: 15.4 G/DL
IMM GRANULOCYTES NFR BLD AUTO: 0.3 %
KETONES URINE: NEGATIVE MG/DL
LDLC SERPL-MCNC: 85 MG/DL
LEUKOCYTE ESTERASE URINE: NEGATIVE
LPL SERPL-CCNC: 100 U/L
LYMPHOCYTES # BLD AUTO: 1.54 K/UL
LYMPHOCYTES NFR BLD AUTO: 25 %
MAN DIFF?: NORMAL
MCHC RBC-ENTMCNC: 32.4 PG
MCHC RBC-ENTMCNC: 33.5 G/DL
MCV RBC AUTO: 96.8 FL
MONOCYTES # BLD AUTO: 0.5 K/UL
MONOCYTES NFR BLD AUTO: 8.1 %
NEUTROPHILS # BLD AUTO: 3.83 K/UL
NEUTROPHILS NFR BLD AUTO: 62.3 %
NITRITE URINE: NEGATIVE
NONHDLC SERPL-MCNC: 104 MG/DL
PH URINE: 7
PLATELET # BLD AUTO: 203 K/UL
POTASSIUM SERPL-SCNC: 4.9 MMOL/L
PROT SERPL-MCNC: 6.9 G/DL
PROTEIN URINE: NEGATIVE MG/DL
PSA SERPL-MCNC: 2.27 NG/ML
RBC # BLD: 4.75 M/UL
RBC # FLD: 12.6 %
SODIUM SERPL-SCNC: 142 MMOL/L
SPECIFIC GRAVITY URINE: 1.01
TRIGL SERPL-MCNC: 102 MG/DL
TSH SERPL-ACNC: 1.71 UIU/ML
UROBILINOGEN URINE: 0.2 MG/DL
WBC # FLD AUTO: 6.15 K/UL

## 2025-04-28 ENCOUNTER — APPOINTMENT (OUTPATIENT)
Dept: CT IMAGING | Facility: HOSPITAL | Age: 80
End: 2025-04-28
Payer: MEDICARE

## 2025-04-28 ENCOUNTER — OUTPATIENT (OUTPATIENT)
Dept: OUTPATIENT SERVICES | Facility: HOSPITAL | Age: 80
LOS: 1 days | End: 2025-04-28
Payer: MEDICARE

## 2025-04-28 DIAGNOSIS — Z98.890 OTHER SPECIFIED POSTPROCEDURAL STATES: Chronic | ICD-10-CM

## 2025-04-28 DIAGNOSIS — Z90.49 ACQUIRED ABSENCE OF OTHER SPECIFIED PARTS OF DIGESTIVE TRACT: Chronic | ICD-10-CM

## 2025-04-28 DIAGNOSIS — R74.8 ABNORMAL LEVELS OF OTHER SERUM ENZYMES: ICD-10-CM

## 2025-04-28 PROCEDURE — 74177 CT ABD & PELVIS W/CONTRAST: CPT

## 2025-04-28 PROCEDURE — 74177 CT ABD & PELVIS W/CONTRAST: CPT | Mod: 26

## 2025-05-06 DIAGNOSIS — R93.2 ABNORMAL FINDINGS ON DIAGNOSTIC IMAGING OF LIVER AND BILIARY TRACT: ICD-10-CM

## 2025-05-08 ENCOUNTER — APPOINTMENT (OUTPATIENT)
Dept: MRI IMAGING | Facility: HOSPITAL | Age: 80
End: 2025-05-08
Payer: MEDICARE

## 2025-05-08 ENCOUNTER — OUTPATIENT (OUTPATIENT)
Dept: OUTPATIENT SERVICES | Facility: HOSPITAL | Age: 80
LOS: 1 days | End: 2025-05-08
Payer: MEDICARE

## 2025-05-08 DIAGNOSIS — R93.2 ABNORMAL FINDINGS ON DIAGNOSTIC IMAGING OF LIVER AND BILIARY TRACT: ICD-10-CM

## 2025-05-08 DIAGNOSIS — Q45.3 OTHER CONGENITAL MALFORMATIONS OF PANCREAS AND PANCREATIC DUCT: ICD-10-CM

## 2025-05-08 DIAGNOSIS — Z98.890 OTHER SPECIFIED POSTPROCEDURAL STATES: Chronic | ICD-10-CM

## 2025-05-08 PROCEDURE — 74183 MRI ABD W/O CNTR FLWD CNTR: CPT | Mod: 26

## 2025-05-08 PROCEDURE — 74183 MRI ABD W/O CNTR FLWD CNTR: CPT

## 2025-05-08 PROCEDURE — A9579: CPT

## 2025-05-27 ENCOUNTER — APPOINTMENT (OUTPATIENT)
Facility: CLINIC | Age: 80
End: 2025-05-27
Payer: MEDICARE

## 2025-05-27 VITALS
RESPIRATION RATE: 16 BRPM | DIASTOLIC BLOOD PRESSURE: 68 MMHG | HEIGHT: 68 IN | TEMPERATURE: 98.6 F | OXYGEN SATURATION: 97 % | SYSTOLIC BLOOD PRESSURE: 139 MMHG | WEIGHT: 149 LBS | BODY MASS INDEX: 22.58 KG/M2 | HEART RATE: 73 BPM

## 2025-05-27 DIAGNOSIS — R74.8 ABNORMAL LEVELS OF OTHER SERUM ENZYMES: ICD-10-CM

## 2025-05-27 PROCEDURE — 99204 OFFICE O/P NEW MOD 45 MIN: CPT

## 2025-06-13 ENCOUNTER — APPOINTMENT (OUTPATIENT)
Dept: MRI IMAGING | Facility: HOSPITAL | Age: 80
End: 2025-06-13

## 2025-06-13 ENCOUNTER — OUTPATIENT (OUTPATIENT)
Dept: OUTPATIENT SERVICES | Facility: HOSPITAL | Age: 80
LOS: 1 days | End: 2025-06-13
Payer: MEDICARE

## 2025-06-13 DIAGNOSIS — C01 MALIGNANT NEOPLASM OF BASE OF TONGUE: ICD-10-CM

## 2025-06-13 DIAGNOSIS — Z98.890 OTHER SPECIFIED POSTPROCEDURAL STATES: Chronic | ICD-10-CM

## 2025-06-13 PROCEDURE — A9579: CPT

## 2025-06-13 PROCEDURE — 70542 MRI ORBIT/FACE/NECK W/DYE: CPT | Mod: 26

## 2025-06-13 PROCEDURE — 70542 MRI ORBIT/FACE/NECK W/DYE: CPT

## 2025-07-01 ENCOUNTER — APPOINTMENT (OUTPATIENT)
Dept: OTOLARYNGOLOGY | Facility: CLINIC | Age: 80
End: 2025-07-01

## 2025-07-09 NOTE — ED ADULT TRIAGE NOTE - AS PAIN REST
4 (moderate pain) My blood sugar is high for me 406. Im only on oral meds. I don't feel good  - patient  Patient reports she ate a flying saucer ice cream sandwich at 8 PM . took her Metformin afterwards. monitored anesthesia care (MAC)

## 2025-07-29 ENCOUNTER — APPOINTMENT (OUTPATIENT)
Dept: OTOLARYNGOLOGY | Facility: CLINIC | Age: 80
End: 2025-07-29
Payer: MEDICARE

## 2025-07-29 VITALS
HEIGHT: 68 IN | BODY MASS INDEX: 22.28 KG/M2 | DIASTOLIC BLOOD PRESSURE: 77 MMHG | HEART RATE: 65 BPM | OXYGEN SATURATION: 97 % | TEMPERATURE: 96.6 F | SYSTOLIC BLOOD PRESSURE: 138 MMHG | WEIGHT: 147 LBS

## 2025-07-29 PROCEDURE — 31575 DIAGNOSTIC LARYNGOSCOPY: CPT

## 2025-07-29 PROCEDURE — 99215 OFFICE O/P EST HI 40 MIN: CPT | Mod: 25

## (undated) DEVICE — SPONGE ENDO PEANUT 5MM

## (undated) DEVICE — Device

## (undated) DEVICE — APPLICATOR SURGICEL LAP TROCAR POINT 2.5MM X 150MM

## (undated) DEVICE — SUT PROLENE 4-0 18" PS-2

## (undated) DEVICE — SP DRAPE ARM

## (undated) DEVICE — SP SHEATH

## (undated) DEVICE — POSITIONER PATIENT SAFETY STRAP 3X60"

## (undated) DEVICE — ELCTR BOVIE SUCTION 8FR 6"

## (undated) DEVICE — TIP CAUTERY SPATULA

## (undated) DEVICE — LONE STAR RETRACTOR RING 12MM BLUNT DISP

## (undated) DEVICE — SP KIT LAP ENTRYGUIDE STND L100X25MM

## (undated) DEVICE — SP COVER CAMERA SHEATH

## (undated) DEVICE — DRAIN RESERVOIR FOR JACKSON PRATT 100CC CARDINAL

## (undated) DEVICE — SOL ANTI FOG

## (undated) DEVICE — VENODYNE/SCD SLEEVE CALF MEDIUM

## (undated) DEVICE — DRSG TEGADERM 2.5 X 3"

## (undated) DEVICE — SUT CHROMIC 3-0 27" SH

## (undated) DEVICE — TIP SCISSOR MCS TIP 10/PK

## (undated) DEVICE — PROBE PRASS SLIM MONOPOLAR STIMULATOR

## (undated) DEVICE — DRAIN SILICONE ROUND 9FR

## (undated) DEVICE — SUT SILK 2-0 30" PSL

## (undated) DEVICE — PACK UPPER BODY

## (undated) DEVICE — SUT SILK 3-0 18" TIES

## (undated) DEVICE — SUT CHROMIC 4-0 27" SH-1

## (undated) DEVICE — SUT SILK 2-0 30" SH

## (undated) DEVICE — LIGASURE EXACT DISSECTOR

## (undated) DEVICE — WARMING BLANKET LOWER ADULT

## (undated) DEVICE — DRAIN JACKSON PRATT 10MM FLAT 3/4 NO TROCAR

## (undated) DEVICE — BLADE SURGICAL #15 CARBON

## (undated) DEVICE — DRSG TEGADERM 2.5X3"

## (undated) DEVICE — SAFETY PIN 1.5" MED

## (undated) DEVICE — SUT SILK 2-0 12-18"

## (undated) DEVICE — PACK TONSIL ADENOID

## (undated) DEVICE — GLV 7.5 PROTEXIS (WHITE)